# Patient Record
Sex: FEMALE | Race: WHITE | NOT HISPANIC OR LATINO | Employment: OTHER | URBAN - NONMETROPOLITAN AREA
[De-identification: names, ages, dates, MRNs, and addresses within clinical notes are randomized per-mention and may not be internally consistent; named-entity substitution may affect disease eponyms.]

---

## 2017-12-23 ENCOUNTER — HOSPITAL ENCOUNTER (EMERGENCY)
Facility: HOSPITAL | Age: 62
Discharge: HOME | End: 2017-12-23
Attending: FAMILY MEDICINE | Admitting: FAMILY MEDICINE

## 2017-12-23 ENCOUNTER — APPOINTMENT (OUTPATIENT)
Dept: GENERAL RADIOLOGY | Facility: HOSPITAL | Age: 62
End: 2017-12-23

## 2017-12-23 VITALS
BODY MASS INDEX: 34.36 KG/M2 | OXYGEN SATURATION: 99 % | SYSTOLIC BLOOD PRESSURE: 125 MMHG | HEART RATE: 72 BPM | TEMPERATURE: 98 F | WEIGHT: 175 LBS | RESPIRATION RATE: 17 BRPM | HEIGHT: 60 IN | DIASTOLIC BLOOD PRESSURE: 69 MMHG

## 2017-12-23 DIAGNOSIS — S20.211A RIB CONTUSION, RIGHT, INITIAL ENCOUNTER: Primary | ICD-10-CM

## 2017-12-23 PROCEDURE — 71101 X-RAY EXAM UNILAT RIBS/CHEST: CPT | Performed by: RADIOLOGY

## 2017-12-23 PROCEDURE — 99283 EMERGENCY DEPT VISIT LOW MDM: CPT

## 2017-12-23 PROCEDURE — 71101 X-RAY EXAM UNILAT RIBS/CHEST: CPT

## 2017-12-23 RX ORDER — ACETAMINOPHEN AND CODEINE PHOSPHATE 300; 30 MG/1; MG/1
1 TABLET ORAL EVERY 6 HOURS PRN
Qty: 12 TABLET | Refills: 0 | Status: SHIPPED | OUTPATIENT
Start: 2017-12-23

## 2017-12-23 RX ORDER — ONDANSETRON 4 MG/1
4 TABLET, ORALLY DISINTEGRATING ORAL ONCE
Status: COMPLETED | OUTPATIENT
Start: 2017-12-23 | End: 2017-12-23

## 2017-12-23 RX ORDER — HYDROCODONE BITARTRATE AND ACETAMINOPHEN 7.5; 325 MG/1; MG/1
1 TABLET ORAL ONCE
Status: COMPLETED | OUTPATIENT
Start: 2017-12-23 | End: 2017-12-23

## 2017-12-23 RX ORDER — FLUOXETINE HYDROCHLORIDE 20 MG/1
40 CAPSULE ORAL DAILY
COMMUNITY

## 2017-12-23 RX ORDER — CYCLOBENZAPRINE HCL 10 MG
10 TABLET ORAL 2 TIMES DAILY PRN
Qty: 20 TABLET | Refills: 0 | Status: SHIPPED | OUTPATIENT
Start: 2017-12-23

## 2017-12-23 RX ORDER — METHYLPREDNISOLONE 4 MG/1
TABLET ORAL
Qty: 21 TABLET | Refills: 0 | OUTPATIENT
Start: 2017-12-23 | End: 2019-05-27

## 2017-12-23 RX ORDER — ASPIRIN 81 MG/1
81 TABLET, CHEWABLE ORAL DAILY
COMMUNITY

## 2017-12-23 RX ADMIN — HYDROCODONE BITARTRATE AND ACETAMINOPHEN 1 TABLET: 7.5; 325 TABLET ORAL at 17:07

## 2017-12-23 RX ADMIN — ONDANSETRON 4 MG: 4 TABLET, ORALLY DISINTEGRATING ORAL at 17:07

## 2017-12-23 NOTE — ED PROVIDER NOTES
Subjective   Patient is a 62 y.o. female presenting with fall.   History provided by:  Patient  Fall   Mechanism of injury: fall    Injury location:  Torso  Torso injury location:  R chest  Incident location:  Home  Time since incident:  5 days  Arrived directly from scene: no    Associated symptoms: difficulty breathing (pain)    Associated symptoms: no abdominal pain and no chest pain        Review of Systems   Constitutional: Negative.  Negative for fever.   HENT: Negative.    Respiratory: Negative.    Cardiovascular: Negative.  Negative for chest pain.   Gastrointestinal: Negative.  Negative for abdominal pain.   Endocrine: Negative.    Genitourinary: Negative.  Negative for dysuria.   Skin: Negative.    Neurological: Negative.    Psychiatric/Behavioral: Negative.    All other systems reviewed and are negative.      Past Medical History:   Diagnosis Date   • Arthritis    • Diabetes mellitus    • Hyperlipidemia    • Hypertension        Allergies   Allergen Reactions   • Penicillins        History reviewed. No pertinent surgical history.    History reviewed. No pertinent family history.    Social History     Social History   • Marital status: Single     Spouse name: N/A   • Number of children: N/A   • Years of education: N/A     Social History Main Topics   • Smoking status: Never Smoker   • Smokeless tobacco: None   • Alcohol use No   • Drug use: No   • Sexual activity: Defer     Other Topics Concern   • None     Social History Narrative   • None           Objective   Physical Exam   Constitutional: She is oriented to person, place, and time. She appears well-developed and well-nourished. No distress.   HENT:   Head: Normocephalic and atraumatic.   Nose: Nose normal.   Eyes: Conjunctivae are normal.   Neck: Normal range of motion. Neck supple. No JVD present. No tracheal deviation present.   Cardiovascular: Normal rate, regular rhythm and normal heart sounds.    No murmur heard.  Pulmonary/Chest: Effort normal  and breath sounds normal. No respiratory distress. She has no wheezes.   Right sided anterior costal pain.  Pain with inspiration.   Abdominal: Bowel sounds are normal. There is no tenderness.   Musculoskeletal: Normal range of motion. She exhibits no edema or deformity.   Neurological: She is alert and oriented to person, place, and time. No cranial nerve deficit.   Skin: Skin is warm and dry. No rash noted. She is not diaphoretic. No erythema. No pallor.   Psychiatric: She has a normal mood and affect. Her behavior is normal. Thought content normal.   Nursing note and vitals reviewed.      Procedures         ED Course  ED Course   Comment By Time   XR interpreted by Dr. Sexton: No apparent acute bony abnormalities, or cardiopulmonary disease. LIBBY Pierce 12/23 1825                  MDM  Number of Diagnoses or Management Options  Rib contusion, right, initial encounter: new and requires workup     Amount and/or Complexity of Data Reviewed  Tests in the radiology section of CPT®: reviewed  Discuss the patient with other providers: yes    Risk of Complications, Morbidity, and/or Mortality  Presenting problems: low  Diagnostic procedures: low  Management options: low    Patient Progress  Patient progress: stable      Final diagnoses:   Rib contusion, right, initial encounter            LIBBY Pierce  12/23/17 0085

## 2018-10-09 ENCOUNTER — HOSPITAL ENCOUNTER (OUTPATIENT)
Dept: PHYSICAL THERAPY | Facility: HOSPITAL | Age: 63
Setting detail: THERAPIES SERIES
Discharge: HOME | End: 2018-10-09

## 2018-10-09 DIAGNOSIS — M51.36 LUMBAR DEGENERATIVE DISC DISEASE: Primary | ICD-10-CM

## 2018-10-09 PROCEDURE — G0283 ELEC STIM OTHER THAN WOUND: HCPCS | Performed by: PHYSICAL THERAPIST

## 2018-10-09 PROCEDURE — 97163 PT EVAL HIGH COMPLEX 45 MIN: CPT | Performed by: PHYSICAL THERAPIST

## 2018-10-09 PROCEDURE — 97010 HOT OR COLD PACKS THERAPY: CPT | Performed by: PHYSICAL THERAPIST

## 2018-10-09 NOTE — THERAPY EVALUATION
"    Outpatient Physical Therapy Ortho Initial Evaluation   [place]     Patient Name: Breanne Dickson  : 1955  MRN: [id number]  Today's Date: 10/9/2018      Visit Date: 10/09/2018    There is no problem list on file for this patient.       Past Medical History:   Diagnosis Date   • Arthritis    • Diabetes mellitus (CMS/HCC)    • Hyperlipidemia    • Hypertension    • Multiple rib fractures         History reviewed. No pertinent surgical history.    Visit Dx:     ICD-10-CM ICD-9-CM   1. Lumbar degenerative disc disease M51.36 722.52             Patient History     Row Name 10/09/18 1300             History    Chief Complaint Difficulty Walking;Difficulty with daily activities;Muscle tenderness;Muscle weakness;Pain  -BE      Type of Pain Back pain  -BE      Date Current Problem(s) Began --   20+ years  -BE      Brief Description of Current Complaint Patient reports that she has been suffering from chronic low back pain for approximately 20+ years; however, she notes that the pain has gotten worse over the past year.  Patient reports that she experiences numbness/tingling in the LEs, especially when she walks; she reports that it extends to the calf.  Patient reports that she had initially only noticed the numbness/tingling in the left LE, but began noticing numbness/tingling in the right LE approximately 6-8 months.  She denies any changes in bowel/bladder function.  She reports that she has had an MRI, which showed \"collapsed vertebrae, spinal stenosis, osteoarthritis.\"  Patient reports that surgery may be considered in the future.  -BE      Patient/Caregiver Goals Relieve pain;Improve mobility;Improve strength  -BE      Current Tobacco Use Electronic cigarettes  -BE      Smoking Status electronic cigarettes  -BE      Patient's Rating of General Health Good  -BE      Hand Dominance right-handed  -BE      Occupation/sports/leisure activities Retired  -BE      Patient seeing anyone else for problem(s)? " Neurosurgeon  -BE      How has patient tried to help current problem? Medication  -BE      What clinical tests have you had for this problem? CT scan;MRI  -BE      Are you or can you be pregnant No  -BE         Pain     Pain Location Back  -BE      Pain at Present 4  -BE      Pain at Best 4  -BE      Pain at Worst 8  -BE      Pain Frequency Constant/continuous  -BE      Pain Description Aching;Dull;Sharp;Stabbing;Tender  -BE      What Performance Factors Make the Current Problem(s) WORSE? Sitting, walking, standing, bending  -BE      What Performance Factors Make the Current Problem(s) BETTER? Rest, medicaiton  -BE      Tolerance Time- Standing 1.5 hour  -BE      Tolerance Time- Sitting 10 min  -BE      Tolerance Time- Walking 5-10 min  -BE      Is your sleep disturbed? Yes  -BE      Total hours of sleep per night 3-5 hours disturbedd  -BE      Difficulties with ADL's? Independent with ADLs, but has increased pain.  -BE         Fall Risk Assessment    Any falls in the past year: Yes  -BE      Number of falls reported in the last 12 months 1/month  -BE      Factors that contributed to the fall: Tripped;Lost balance  -BE      Does patient have a fear of falling Yes (comment)  -BE         Services    Are you currently receiving Home Health services No  -BE         Daily Activities    Primary Language English  -BE      Are you able to read Yes  -BE      Are you able to write Yes  -BE      How does patient learn best? Demonstration  -BE      Teaching needs identified Home Exercise Program  -BE      Does patient have problems with the following? Depression   MD aware, per patient  -BE      Pt Participated in POC and Goals Yes  -BE         Safety    Are you being hurt, hit, or frightened by anyone at home or in your life? No  -BE      Are you being neglected by a caregiver No  -BE        User Key  (r) = Recorded By, (t) = Taken By, (c) = Cosigned By    Initials Name Provider Type    BE Rosa Mosquera, PT Physical  Therapist                PT Ortho     Row Name 10/09/18 1300       Quarter Clearing    Quarter Clearing Lower Quarter Clearing  -BE       DTR- Lower Quarter Clearing    Patellar tendon (L2-4) Bilateral:;2- Normal response  -BE    Achilles tendon (S1-2) Bilateral:;2- Normal response  -BE       Neural Tension Signs- Lower Quarter Clearing    Slump Bilateral:;Negative  -BE       Sensory Screen for Light Touch- Lower Quarter Clearing    L1 (inguinal area) Bilateral:;Intact  -BE    L2 (anterior mid thigh) Bilateral:;Intact  -BE    L3 (distal anterior thigh) Bilateral:;Intact  -BE    L4 (medial lower leg/foot) Bilateral:;Intact  -BE    L5 (lateral lower leg/great toe) Bilateral:;Intact  -BE    S1 (bottom of foot) Bilateral:;Intact  -BE       Myotomal Screen- Lower Quarter Clearing    Hip flexion (L2) Bilateral:;4- (Good -)  -BE    Knee extension (L3) Bilateral:;4 (Good)  -BE    Knee flexion (S2) Bilateral:;4 (Good)  -BE       Lumbar ROM Screen- Lower Quarter Clearing    Lumbar Flexion Impaired   50%  -BE    Lumbar Extension Impaired   25%  -BE    Lumbar Lateral Flexion Impaired   Left-50%, Right-50%  -BE    Lumbar Rotation Impaired   Left-50%, Right-50%  -BE       Special Tests/Palpation    Special Tests/Palpation Lumbar/SI  -BE       Lumbosacral Palpation    SI Tender  -BE    Lumbosacral Segment Tender  -BE    Spinous Process Tender  -BE    Quadratus Lumborum Bilateral:;Guarded/taut  -BE    Erector Spinae (Paraspinals) Bilateral:;Guarded/taut  -BE      User Key  (r) = Recorded By, (t) = Taken By, (c) = Cosigned By    Initials Name Provider Type    BE Rosa Mosquera PT Physical Therapist                                  PT OP Goals     Row Name 10/09/18 1400          PT Short Term Goals    STG Date to Achieve 10/23/18  -BE     STG 1 Patient will be independent/compliant with HEP.  -BE     STG 2 Patient will report pain no greater than 6/10 when performing self-care activities.  -BE     STG 3 Patient will report pain  no greater than 6/10 when sitting to travel community distances.  -BE        Long Term Goals    LTG Date to Achieve 11/08/18  -BE     LTG 1 Lumbar ROM will improve by at least 25% to allow for greater ease with ADLs.  -BE     LTG 2 LE strength will improve to at least 4+/5 to prevent injury.  -BE     LTG 3 Modified Oswestry will improve by at least 10% to show improved functional mobility.  -BE     LTG 4 Patient will report pain no greater than 4/10 when performing light household chores.  -BE        Time Calculation    PT Goal Re-Cert Due Date 11/08/18  -BE       User Key  (r) = Recorded By, (t) = Taken By, (c) = Cosigned By    Initials Name Provider Type    BE Rosa Mosquera PT Physical Therapist                PT Assessment/Plan     Row Name 10/09/18 1500          PT Assessment    Functional Limitations Impaired gait;Impaired locomotion;Limitation in home management;Limitations in community activities;Performance in leisure activities;Performance in self-care ADL  -BE     Impairments Balance;Gait;Joint mobility;Muscle strength;Pain;Poor body mechanics;Posture;Range of motion  -BE     Assessment Comments Patient is a 63 year old female referred to therapy with lumbar degenerative disc disease.  Patient displays decreased lumbar ROM, decreased LE strength, and increased pain.  Patient reports 4/10 pain at best and 8/10 pain at worst.  She notes that it is difficult to sit or walk for extended lengths of time, due to pain.  Patient reports a 40% functional mobility impairment, based on her response to the Modified Oswestry.  Patient will benefit from skilled PT so that she can achieve her maximum level of function.  -BE     Please refer to paper survey for additional self-reported information Yes  -BE     Rehab Potential Good  -BE     Patient/caregiver participated in establishment of treatment plan and goals Yes  -BE     Patient would benefit from skilled therapy intervention Yes  -BE        PT Plan    PT  Frequency 2x/week;3x/week  -BE     Predicted Duration of Therapy Intervention (Therapy Eval) 4 weeks  -BE     Planned CPT's? PT EVAL HIGH COMPLEXITY: 94198;PT RE-EVAL: 46340;PT THER PROC EA 15 MIN: 56615;PT THER ACT EA 15 MIN: 88421;PT MANUAL THERAPY EA 15 MIN: 80959;PT NEUROMUSC RE-EDUCATION EA 15 MIN: 86318;PT GAIT TRAINING EA 15 MIN: 66796;PT ELECTRICAL STIM UNATTEND: ;PT ELECTRICAL STIM ATTD EA 15 MIN: 49211;PT ULTRASOUND EA 15 MIN: 87878;PT HOT/COLD PACK WC NONMCARE: 97843;PT THER SUPP EA 15 MIN  -BE     PT Plan Comments Above interventions to be used to promote improved functional mobility.  -BE       User Key  (r) = Recorded By, (t) = Taken By, (c) = Cosigned By    Initials Name Provider Type    BE Rosa Mosquera, PT Physical Therapist                Modalities     Row Name 10/09/18 1300             Moist Heat    MH Applied Yes   no redness following Mh  -BE      Location Lumbar  -BE      Rx Minutes 15 mins  -BE      MH S/P Rx Yes   with ESTIM in seated position  -BE         ELECTRICAL STIMULATION    Attended/Unattended Unattended   no skin irritation following ESTIM  -BE      Stimulation Type IFC  -BE      Max mAmp --   per patient's tolerance  -BE      Location/Electrode Placement/Other Lumbar   15 min  -BE        User Key  (r) = Recorded By, (t) = Taken By, (c) = Cosigned By    Initials Name Provider Type    BE Rosa Mosquera, PT Physical Therapist              Exercises     Row Name 10/09/18 1400             Exercise 1    Exercise Name 1 HEP: LTR, supine march, supine clams  -BE      Cueing 1 Verbal;Tactile;Demo  -BE        User Key  (r) = Recorded By, (t) = Taken By, (c) = Cosigned By    Initials Name Provider Type    BE Rosa Mosquera, PT Physical Therapist                        Outcome Measure Options: Modifed Owestry  Modified Oswestry  Modified Oswestry Score/Comments: 20/50=40%      Time Calculation:     Therapy Suggested Charges     Code   Minutes Charges    None              Start Time: 1345  Stop Time: 1500  Time Calculation (min): 75 min     Therapy Charges for Today     Code Description Service Date Service Provider Modifiers Qty    32970505864 HC PT ELECTRICAL STIM UNATTENDED 10/9/2018 Rosa Mosquera, PT  1    77500122639 HC PT HOT/COLD PACK WC NONMCARE 10/9/2018 Rosa Mosquera, PT GP 1    37481130113 HC PT EVAL HIGH COMPLEXITY 4 10/9/2018 Rosa Mosquera, PT GP 1          PT G-Codes  Outcome Measure Options: Elijah Blanco  Modified Oswestry Score/Comments: 20/50=40%         Rosa Mosquera, PT  10/9/2018

## 2018-10-10 ENCOUNTER — TRANSCRIBE ORDERS (OUTPATIENT)
Dept: PHYSICAL THERAPY | Facility: HOSPITAL | Age: 63
End: 2018-10-10

## 2018-10-10 DIAGNOSIS — M51.36 DEGENERATION OF LUMBAR INTERVERTEBRAL DISC: Primary | ICD-10-CM

## 2018-10-12 ENCOUNTER — APPOINTMENT (OUTPATIENT)
Dept: PHYSICAL THERAPY | Facility: HOSPITAL | Age: 63
End: 2018-10-12

## 2018-10-18 ENCOUNTER — HOSPITAL ENCOUNTER (OUTPATIENT)
Dept: PHYSICAL THERAPY | Facility: HOSPITAL | Age: 63
Setting detail: THERAPIES SERIES
Discharge: HOME | End: 2018-10-18

## 2018-10-18 DIAGNOSIS — M51.36 LUMBAR DEGENERATIVE DISC DISEASE: Primary | ICD-10-CM

## 2018-10-18 PROCEDURE — 97140 MANUAL THERAPY 1/> REGIONS: CPT | Performed by: PHYSICAL THERAPIST

## 2018-10-18 PROCEDURE — 97110 THERAPEUTIC EXERCISES: CPT | Performed by: PHYSICAL THERAPIST

## 2018-10-18 PROCEDURE — 97010 HOT OR COLD PACKS THERAPY: CPT | Performed by: PHYSICAL THERAPIST

## 2018-10-18 PROCEDURE — G0283 ELEC STIM OTHER THAN WOUND: HCPCS | Performed by: PHYSICAL THERAPIST

## 2018-10-18 NOTE — THERAPY TREATMENT NOTE
Outpatient Physical Therapy Ortho Treatment Note   [place]     Patient Name: Breanne Dickson  : 1955  MRN: [id number]  Today's Date: 10/18/2018      Visit Date: 10/18/2018    Visit Dx:    ICD-10-CM ICD-9-CM   1. Lumbar degenerative disc disease M51.36 722.52       There is no problem list on file for this patient.       Past Medical History:   Diagnosis Date   • Arthritis    • Diabetes mellitus (CMS/HCC)    • Hyperlipidemia    • Hypertension    • Multiple rib fractures         No past surgical history on file.          PT Ortho     Row Name 10/18/18 1500       Subjective Comments    Subjective Comments Patient reports that she had an epidural and that her back pain has decreased.  She notes 4/10 pain today.  -BE       Subjective Pain    Able to rate subjective pain? yes  -BE    Pre-Treatment Pain Level 4  -BE    Post-Treatment Pain Level 0  -BE      User Key  (r) = Recorded By, (t) = Taken By, (c) = Cosigned By    Initials Name Provider Type    BE Rosa Mosquera PT Physical Therapist                            PT Assessment/Plan     Row Name 10/18/18 4842          PT Assessment    Assessment Comments Patient session initiated with MH/ESTIM to the lumbar region to assist with pain control; no adverse reactions were noted.  Ther ex performed in supine position, with exercises focusing on lumbar ROM, core strengthening, and LE strengthening.  Soft tissue mobilization performed to the lumbar musculature, with increased muscle guarding noted.  Patient reported no pain following today's session.  She will continue to be progressed per her tolerance and POC.  -BE        PT Plan    PT Plan Comments Progress per patient's tolerance and POC.  -BE       User Key  (r) = Recorded By, (t) = Taken By, (c) = Cosigned By    Initials Name Provider Type    BE Rosa Mosquera PT Physical Therapist                Modalities     Row Name 10/18/18 8212             Moist Heat    MH Applied Yes   no redness  following MH  -BE      Location Lumbar  -BE      Rx Minutes 15 mins  -BE      MH S/P Rx Yes   with ESTIM in seated position  -BE         ELECTRICAL STIMULATION    Attended/Unattended Unattended   no skin irritation following ESTIM  -BE      Stimulation Type IFC  -BE      Max mAmp --   per patient's tolerance  -BE      Location/Electrode Placement/Other Lumbar   15 min  -BE        User Key  (r) = Recorded By, (t) = Taken By, (c) = Cosigned By    Initials Name Provider Type    BE Rosa Mosquera, PT Physical Therapist                Exercises     Row Name 10/18/18 1500             Subjective Comments    Subjective Comments Patient reports that she had an epidural and that her back pain has decreased.  She notes 4/10 pain today.  -BE         Subjective Pain    Able to rate subjective pain? yes  -BE      Pre-Treatment Pain Level 4  -BE      Post-Treatment Pain Level 0  -BE         Total Minutes    42411 - PT Therapeutic Exercise Minutes 30  -BE      65664 - PT Manual Therapy Minutes 15  -BE         Exercise 1    Exercise Name 1 LTR, supine clams, supine march, SKTC, PPT, Piriformis stretch, SAQ  -BE      Cueing 1 Verbal;Tactile;Demo  -BE      Time 1 30 min  -BE        User Key  (r) = Recorded By, (t) = Taken By, (c) = Cosigned By    Initials Name Provider Type    BE Rosa Mosquera, PT Physical Therapist                        Manual Rx (last 36 hours)      Manual Treatments     Row Name 10/18/18 1500             Total Minutes    57787 - PT Manual Therapy Minutes 15  -BE         Manual Rx 1    Manual Rx 1 Location Lumbar  -BE      Manual Rx 1 Type Soft tissue mobilization  -BE      Manual Rx 1 Grade Per patient's tolerance  -BE      Manual Rx 1 Duration 15 min  -BE        User Key  (r) = Recorded By, (t) = Taken By, (c) = Cosigned By    Initials Name Provider Type    BE Rosa Mosquera, PT Physical Therapist                             Time Calculation:   Start Time: 1406  Stop Time: 1513  Time Calculation  (min): 67 min  Therapy Suggested Charges     Code   Minutes Charges    53091 (CPT®) Hc Pt Neuromusc Re Education Ea 15 Min      50359 (CPT®) Hc Pt Ther Proc Ea 15 Min 30 2    18999 (CPT®) Hc Gait Training Ea 15 Min      21219 (CPT®) Hc Pt Therapeutic Act Ea 15 Min      32217 (CPT®) Hc Pt Manual Therapy Ea 15 Min 15 1    20724 (CPT®) Hc Pt Ther Massage- Per 15 Min      50177 (CPT®) Hc Pt Iontophoresis Ea 15 Min      06390 (CPT®) Hc Pt Elec Stim Ea-Per 15 Min      90117 (CPT®) Hc Pt Ultrasound Ea 15 Min      56481 (CPT®) Hc Pt Self Care/Mgmt/Train Ea 15 Min      98501 (CPT®) Hc Pt Prosthetic (S) Train Initial Encounter, Each 15 Min      96652 (CPT®) Hc Orthotic(S) Mgmt/Train Initial Encounter, Each 15min      29458 (CPT®) Hc Pt Aquatic Therapy Ea 15 Min      92913 (CPT®) Hc Pt Orthotic(S)/Prosthetic(S) Encounter, Each 15 Min       (CPT®) Hc Pt Electrical Stim Unattended      Total  45 3        Therapy Charges for Today     Code Description Service Date Service Provider Modifiers Qty    50531160486 HC PT THER PROC EA 15 MIN 10/18/2018 Rosa Mosquera, PT GP 2    94737912851 HC PT MANUAL THERAPY EA 15 MIN 10/18/2018 Rosa Mosquera, PT GP 1    58136702659 HC PT ELECTRICAL STIM UNATTENDED 10/18/2018 Rosa Mosquera, PT  1    20970853777 HC PT HOT/COLD PACK WC NONMCARE 10/18/2018 Rosa Mosquera, PT GP 1                    Rosa Mosquera, PT  10/18/2018

## 2018-11-29 ENCOUNTER — DOCUMENTATION (OUTPATIENT)
Dept: PHYSICAL THERAPY | Facility: HOSPITAL | Age: 63
End: 2018-11-29

## 2018-11-29 DIAGNOSIS — M51.36 LUMBAR DEGENERATIVE DISC DISEASE: Primary | ICD-10-CM

## 2018-11-29 NOTE — THERAPY DISCHARGE NOTE
Outpatient Physical Therapy Discharge Summary         Patient Name: Breanne Dickson  : 1955  MRN: [id number]    Today's Date: 2018    Visit Dx:    ICD-10-CM ICD-9-CM   1. Lumbar degenerative disc disease M51.36 722.52       PT OP Goals     Row Name 18 1100          PT Short Term Goals    STG Date to Achieve  10/23/18  -BE     STG 1  Patient will be independent/compliant with HEP.  -BE     STG 1 Progress Comments  unable to assess  -BE     STG 2  Patient will report pain no greater than 6/10 when performing self-care activities.  -BE     STG 2 Progress Comments  unable to assess  -BE     STG 3  Patient will report pain no greater than 6/10 when sitting to travel community distances.  -BE     STG 3 Progress Comments  unable to assess  -BE        Long Term Goals    LTG Date to Achieve  18  -BE     LTG 1  Lumbar ROM will improve by at least 25% to allow for greater ease with ADLs.  -BE     LTG 1 Progress Comments  unable to assess  -BE     LTG 2  LE strength will improve to at least 4+/5 to prevent injury.  -BE     LTG 2 Progress Comments  unable to assess  -BE     LTG 3  Modified Oswestry will improve by at least 10% to show improved functional mobility.  -BE     LTG 3 Progress Comments  unable to assess  -BE     LTG 4  Patient will report pain no greater than 4/10 when performing light household chores.  -BE     LTG 4 Progress Comments  unable to assess  -BE       User Key  (r) = Recorded By, (t) = Taken By, (c) = Cosigned By    Initials Name Provider Type    BE Rosa Mosquera PT Physical Therapist          OP PT Discharge Summary  Date of Discharge: 18  Reason for Discharge: other (comment)(failure to return to PT)  Outcomes Achieved: Refer to plan of care for updates on goals achieved  Discharge Destination: Unknown  Discharge Instructions/Additional Comments: Patient to be discharged at this time, due to failure to return to PT.  Patient attended therapy for a total of 2  sessions, dating from 10/9/2018 to 10/18/2018.  Attempts were made to contact patient, but she could not be reached.  Current status is unknown since goals/outcome measures could not be assessed.      Time Calculation:        Therapy Suggested Charges     Code   Minutes Charges    None                       Rosa Mosquera, PT  11/29/2018

## 2019-05-27 ENCOUNTER — HOSPITAL ENCOUNTER (EMERGENCY)
Facility: HOSPITAL | Age: 64
Discharge: HOME | End: 2019-05-27
Attending: EMERGENCY MEDICINE | Admitting: EMERGENCY MEDICINE

## 2019-05-27 VITALS
TEMPERATURE: 98.7 F | SYSTOLIC BLOOD PRESSURE: 145 MMHG | RESPIRATION RATE: 16 BRPM | HEART RATE: 87 BPM | DIASTOLIC BLOOD PRESSURE: 84 MMHG | BODY MASS INDEX: 32.79 KG/M2 | OXYGEN SATURATION: 98 % | WEIGHT: 167 LBS | HEIGHT: 60 IN

## 2019-05-27 DIAGNOSIS — M10.9 ACUTE GOUTY ARTHRITIS: Primary | ICD-10-CM

## 2019-05-27 LAB — URATE SERPL-MCNC: 8.6 MG/DL (ref 2.4–5.7)

## 2019-05-27 PROCEDURE — 25010000002 DEXAMETHASONE SODIUM PHOSPHATE 20 MG/5ML SOLUTION: Performed by: EMERGENCY MEDICINE

## 2019-05-27 PROCEDURE — 84550 ASSAY OF BLOOD/URIC ACID: CPT | Performed by: EMERGENCY MEDICINE

## 2019-05-27 PROCEDURE — 25010000002 KETOROLAC TROMETHAMINE PER 15 MG: Performed by: EMERGENCY MEDICINE

## 2019-05-27 PROCEDURE — 99283 EMERGENCY DEPT VISIT LOW MDM: CPT

## 2019-05-27 PROCEDURE — 96372 THER/PROPH/DIAG INJ SC/IM: CPT

## 2019-05-27 RX ORDER — HYDROCODONE BITARTRATE AND ACETAMINOPHEN 7.5; 325 MG/1; MG/1
1 TABLET ORAL ONCE
Status: COMPLETED | OUTPATIENT
Start: 2019-05-27 | End: 2019-05-27

## 2019-05-27 RX ORDER — INDOMETHACIN 50 MG/1
50 CAPSULE ORAL
Qty: 15 CAPSULE | Refills: 0 | Status: SHIPPED | OUTPATIENT
Start: 2019-05-27

## 2019-05-27 RX ORDER — KETOROLAC TROMETHAMINE 30 MG/ML
30 INJECTION, SOLUTION INTRAMUSCULAR; INTRAVENOUS ONCE
Status: COMPLETED | OUTPATIENT
Start: 2019-05-27 | End: 2019-05-27

## 2019-05-27 RX ORDER — METHYLPREDNISOLONE 4 MG/1
TABLET ORAL
Qty: 21 TABLET | Refills: 0 | Status: SHIPPED | OUTPATIENT
Start: 2019-05-27

## 2019-05-27 RX ORDER — DEXAMETHASONE SODIUM PHOSPHATE 4 MG/ML
10 INJECTION, SOLUTION INTRA-ARTICULAR; INTRALESIONAL; INTRAMUSCULAR; INTRAVENOUS; SOFT TISSUE ONCE
Status: COMPLETED | OUTPATIENT
Start: 2019-05-27 | End: 2019-05-27

## 2019-05-27 RX ADMIN — DEXAMETHASONE SODIUM PHOSPHATE 10 MG: 4 INJECTION, SOLUTION INTRAMUSCULAR; INTRAVENOUS at 19:21

## 2019-05-27 RX ADMIN — KETOROLAC TROMETHAMINE 30 MG: 30 INJECTION, SOLUTION INTRAMUSCULAR at 19:20

## 2019-05-27 RX ADMIN — HYDROCODONE BITARTRATE AND ACETAMINOPHEN 1 TABLET: 7.5; 325 TABLET ORAL at 19:20

## 2021-03-19 ENCOUNTER — APPOINTMENT (OUTPATIENT)
Dept: MAMMOGRAPHY | Facility: HOSPITAL | Age: 66
End: 2021-03-19

## 2021-03-30 ENCOUNTER — TRANSCRIBE ORDERS (OUTPATIENT)
Dept: ADMINISTRATIVE | Facility: HOSPITAL | Age: 66
End: 2021-03-30

## 2021-03-30 DIAGNOSIS — F17.210 CIGARETTE SMOKER: Primary | ICD-10-CM

## 2021-05-28 ENCOUNTER — HOSPITAL ENCOUNTER (OUTPATIENT)
Dept: CT IMAGING | Facility: HOSPITAL | Age: 66
Discharge: HOME | End: 2021-05-28

## 2021-05-28 ENCOUNTER — HOSPITAL ENCOUNTER (OUTPATIENT)
Dept: BONE DENSITY | Facility: HOSPITAL | Age: 66
Discharge: HOME | End: 2021-05-28

## 2021-05-28 ENCOUNTER — HOSPITAL ENCOUNTER (OUTPATIENT)
Dept: MAMMOGRAPHY | Facility: HOSPITAL | Age: 66
Discharge: HOME | End: 2021-05-28

## 2021-05-28 DIAGNOSIS — Z12.31 VISIT FOR SCREENING MAMMOGRAM: ICD-10-CM

## 2021-05-28 DIAGNOSIS — M81.0 AGE RELATED OSTEOPOROSIS, UNSPECIFIED PATHOLOGICAL FRACTURE PRESENCE: ICD-10-CM

## 2021-05-28 DIAGNOSIS — F17.210 CIGARETTE SMOKER: ICD-10-CM

## 2021-05-28 PROCEDURE — 71271 CT THORAX LUNG CANCER SCR C-: CPT | Performed by: RADIOLOGY

## 2021-05-28 PROCEDURE — 77063 BREAST TOMOSYNTHESIS BI: CPT

## 2021-05-28 PROCEDURE — 71271 CT THORAX LUNG CANCER SCR C-: CPT

## 2021-05-28 PROCEDURE — 77080 DXA BONE DENSITY AXIAL: CPT | Performed by: RADIOLOGY

## 2021-05-28 PROCEDURE — 77080 DXA BONE DENSITY AXIAL: CPT

## 2021-05-28 PROCEDURE — 77067 SCR MAMMO BI INCL CAD: CPT

## 2021-05-31 PROCEDURE — 77067 SCR MAMMO BI INCL CAD: CPT | Performed by: RADIOLOGY

## 2021-05-31 PROCEDURE — 77063 BREAST TOMOSYNTHESIS BI: CPT | Performed by: RADIOLOGY

## 2023-01-06 ENCOUNTER — TRANSCRIBE ORDERS (OUTPATIENT)
Dept: ADMINISTRATIVE | Facility: HOSPITAL | Age: 68
End: 2023-01-06
Payer: MEDICARE

## 2023-01-06 DIAGNOSIS — Z87.891 PERSONAL HISTORY OF TOBACCO USE, PRESENTING HAZARDS TO HEALTH: Primary | ICD-10-CM

## 2023-02-07 ENCOUNTER — HOSPITAL ENCOUNTER (OUTPATIENT)
Dept: CT IMAGING | Facility: HOSPITAL | Age: 68
Discharge: HOME | End: 2023-02-07
Admitting: FAMILY MEDICINE
Payer: MEDICARE

## 2023-02-07 DIAGNOSIS — Z87.891 PERSONAL HISTORY OF TOBACCO USE, PRESENTING HAZARDS TO HEALTH: ICD-10-CM

## 2023-02-07 PROCEDURE — 71271 CT THORAX LUNG CANCER SCR C-: CPT

## 2023-02-07 PROCEDURE — 71271 CT THORAX LUNG CANCER SCR C-: CPT | Performed by: RADIOLOGY

## 2024-02-22 ENCOUNTER — APPOINTMENT (OUTPATIENT)
Dept: MRI IMAGING | Facility: HOSPITAL | Age: 69
End: 2024-02-22
Payer: MEDICARE

## 2024-02-22 ENCOUNTER — APPOINTMENT (OUTPATIENT)
Dept: GENERAL RADIOLOGY | Facility: HOSPITAL | Age: 69
End: 2024-02-22
Payer: MEDICARE

## 2024-02-22 ENCOUNTER — HOSPITAL ENCOUNTER (INPATIENT)
Facility: HOSPITAL | Age: 69
LOS: 6 days | Discharge: HOME | End: 2024-02-28
Attending: STUDENT IN AN ORGANIZED HEALTH CARE EDUCATION/TRAINING PROGRAM | Admitting: INTERNAL MEDICINE
Payer: MEDICARE

## 2024-02-22 ENCOUNTER — APPOINTMENT (OUTPATIENT)
Dept: CT IMAGING | Facility: HOSPITAL | Age: 69
End: 2024-02-22
Payer: MEDICARE

## 2024-02-22 DIAGNOSIS — J44.1 COPD WITH ACUTE EXACERBATION: ICD-10-CM

## 2024-02-22 DIAGNOSIS — S72.114A CLOSED NONDISPLACED FRACTURE OF GREATER TROCHANTER OF RIGHT FEMUR, INITIAL ENCOUNTER: ICD-10-CM

## 2024-02-22 DIAGNOSIS — A41.9 SEPSIS, DUE TO UNSPECIFIED ORGANISM, UNSPECIFIED WHETHER ACUTE ORGAN DYSFUNCTION PRESENT: ICD-10-CM

## 2024-02-22 DIAGNOSIS — S72.144A CLOSED NONDISPLACED INTERTROCHANTERIC FRACTURE OF RIGHT FEMUR, INITIAL ENCOUNTER: Primary | ICD-10-CM

## 2024-02-22 DIAGNOSIS — J96.21 ACUTE ON CHRONIC RESPIRATORY FAILURE WITH HYPOXIA: ICD-10-CM

## 2024-02-22 DIAGNOSIS — E87.70 HYPERVOLEMIA, UNSPECIFIED HYPERVOLEMIA TYPE: ICD-10-CM

## 2024-02-22 DIAGNOSIS — J44.9 CHRONIC OBSTRUCTIVE PULMONARY DISEASE, UNSPECIFIED COPD TYPE: ICD-10-CM

## 2024-02-22 PROBLEM — S72.141A INTERTROCHANTERIC FRACTURE OF RIGHT FEMUR: Status: ACTIVE | Noted: 2024-02-22

## 2024-02-22 LAB
A-A DO2: 52 MMHG (ref 0–300)
ALBUMIN SERPL-MCNC: 3.8 G/DL (ref 3.5–5.2)
ALBUMIN/GLOB SERPL: 1.2 G/DL
ALP SERPL-CCNC: 88 U/L (ref 39–117)
ALT SERPL W P-5'-P-CCNC: 18 U/L (ref 1–33)
ANION GAP SERPL CALCULATED.3IONS-SCNC: 12.4 MMOL/L (ref 5–15)
ARTERIAL PATENCY WRIST A: ABNORMAL
AST SERPL-CCNC: 21 U/L (ref 1–32)
ATMOSPHERIC PRESS: 719 MMHG
BASE EXCESS BLDA CALC-SCNC: -2.2 MMOL/L (ref 0–2)
BASOPHILS # BLD AUTO: 0.04 10*3/MM3 (ref 0–0.2)
BASOPHILS NFR BLD AUTO: 0.3 % (ref 0–1.5)
BDY SITE: ABNORMAL
BILIRUB SERPL-MCNC: 0.3 MG/DL (ref 0–1.2)
BILIRUB UR QL STRIP: NEGATIVE
BUN SERPL-MCNC: 27 MG/DL (ref 8–23)
BUN/CREAT SERPL: 28.7 (ref 7–25)
CALCIUM SPEC-SCNC: 9.6 MG/DL (ref 8.6–10.5)
CHLORIDE SERPL-SCNC: 103 MMOL/L (ref 98–107)
CLARITY UR: CLEAR
CO2 BLDA-SCNC: 22.1 MMOL/L (ref 22–33)
CO2 SERPL-SCNC: 22.6 MMOL/L (ref 22–29)
COHGB MFR BLD: 3.2 % (ref 0–5)
COLOR UR: YELLOW
CREAT SERPL-MCNC: 0.94 MG/DL (ref 0.57–1)
CRP SERPL-MCNC: 8.72 MG/DL (ref 0–0.5)
D DIMER PPP FEU-MCNC: 1.28 MCGFEU/ML (ref 0–0.67)
D-LACTATE SERPL-SCNC: 1.2 MMOL/L (ref 0.5–2)
DEPRECATED RDW RBC AUTO: 58.3 FL (ref 37–54)
EGFRCR SERPLBLD CKD-EPI 2021: 66.6 ML/MIN/1.73
EOSINOPHIL # BLD AUTO: 0.34 10*3/MM3 (ref 0–0.4)
EOSINOPHIL NFR BLD AUTO: 2.6 % (ref 0.3–6.2)
ERYTHROCYTE [DISTWIDTH] IN BLOOD BY AUTOMATED COUNT: 17.9 % (ref 12.3–15.4)
FLUAV RNA RESP QL NAA+PROBE: NOT DETECTED
FLUBV RNA ISLT QL NAA+PROBE: NOT DETECTED
GEN 5 2HR TROPONIN T REFLEX: 50 NG/L
GLOBULIN UR ELPH-MCNC: 3.1 GM/DL
GLUCOSE BLDC GLUCOMTR-MCNC: 134 MG/DL (ref 70–130)
GLUCOSE SERPL-MCNC: 91 MG/DL (ref 65–99)
GLUCOSE UR STRIP-MCNC: ABNORMAL MG/DL
HCO3 BLDA-SCNC: 21.1 MMOL/L (ref 20–26)
HCT VFR BLD AUTO: 42.1 % (ref 34–46.6)
HCT VFR BLD CALC: 42.8 % (ref 38–51)
HGB BLD-MCNC: 13.6 G/DL (ref 12–15.9)
HGB BLDA-MCNC: 13.9 G/DL (ref 13.5–17.5)
HGB UR QL STRIP.AUTO: NEGATIVE
HOLD SPECIMEN: NORMAL
HOLD SPECIMEN: NORMAL
IMM GRANULOCYTES # BLD AUTO: 0.05 10*3/MM3 (ref 0–0.05)
IMM GRANULOCYTES NFR BLD AUTO: 0.4 % (ref 0–0.5)
INHALED O2 CONCENTRATION: 21 %
KETONES UR QL STRIP: NEGATIVE
LEUKOCYTE ESTERASE UR QL STRIP.AUTO: NEGATIVE
LYMPHOCYTES # BLD AUTO: 3.3 10*3/MM3 (ref 0.7–3.1)
LYMPHOCYTES NFR BLD AUTO: 25.2 % (ref 19.6–45.3)
Lab: ABNORMAL
Lab: ABNORMAL
MCH RBC QN AUTO: 28.8 PG (ref 26.6–33)
MCHC RBC AUTO-ENTMCNC: 32.3 G/DL (ref 31.5–35.7)
MCV RBC AUTO: 89.2 FL (ref 79–97)
METHGB BLD QL: 0 % (ref 0–3)
MODALITY: ABNORMAL
MONOCYTES # BLD AUTO: 0.84 10*3/MM3 (ref 0.1–0.9)
MONOCYTES NFR BLD AUTO: 6.4 % (ref 5–12)
NEUTROPHILS NFR BLD AUTO: 65.1 % (ref 42.7–76)
NEUTROPHILS NFR BLD AUTO: 8.51 10*3/MM3 (ref 1.7–7)
NITRITE UR QL STRIP: NEGATIVE
NOTIFIED BY: ABNORMAL
NOTIFIED WHO: ABNORMAL
NRBC BLD AUTO-RTO: 0 /100 WBC (ref 0–0.2)
NT-PROBNP SERPL-MCNC: ABNORMAL PG/ML (ref 0–900)
OXYHGB MFR BLDV: 86 % (ref 94–99)
PCO2 BLDA: 31.5 MM HG (ref 35–45)
PCO2 TEMP ADJ BLD: ABNORMAL MM[HG]
PH BLDA: 7.43 PH UNITS (ref 7.35–7.45)
PH UR STRIP.AUTO: 5.5 [PH] (ref 5–8)
PH, TEMP CORRECTED: ABNORMAL
PLATELET # BLD AUTO: 336 10*3/MM3 (ref 140–450)
PMV BLD AUTO: 9.7 FL (ref 6–12)
PO2 BLDA: 53.6 MM HG (ref 83–108)
PO2 TEMP ADJ BLD: ABNORMAL MM[HG]
POTASSIUM SERPL-SCNC: 4 MMOL/L (ref 3.5–5.2)
PROCALCITONIN SERPL-MCNC: 0.14 NG/ML (ref 0–0.25)
PROT SERPL-MCNC: 6.9 G/DL (ref 6–8.5)
PROT UR QL STRIP: NEGATIVE
RBC # BLD AUTO: 4.72 10*6/MM3 (ref 3.77–5.28)
SAO2 % BLDCOA: 88.8 % (ref 94–99)
SARS-COV-2 RNA RESP QL NAA+PROBE: NOT DETECTED
SODIUM SERPL-SCNC: 138 MMOL/L (ref 136–145)
SP GR UR STRIP: 1.02 (ref 1–1.03)
TROPONIN T DELTA: -3 NG/L
TROPONIN T SERPL HS-MCNC: 53 NG/L
TSH SERPL DL<=0.05 MIU/L-ACNC: 0.92 UIU/ML (ref 0.27–4.2)
URATE SERPL-MCNC: 6.6 MG/DL (ref 2.4–5.7)
UROBILINOGEN UR QL STRIP: ABNORMAL
VENTILATOR MODE: ABNORMAL
WBC NRBC COR # BLD AUTO: 13.08 10*3/MM3 (ref 3.4–10.8)
WHOLE BLOOD HOLD COAG: NORMAL
WHOLE BLOOD HOLD SPECIMEN: NORMAL

## 2024-02-22 PROCEDURE — 82805 BLOOD GASES W/O2 SATURATION: CPT

## 2024-02-22 PROCEDURE — 25010000002 FUROSEMIDE PER 20 MG: Performed by: PHYSICIAN ASSISTANT

## 2024-02-22 PROCEDURE — 73502 X-RAY EXAM HIP UNI 2-3 VIEWS: CPT | Performed by: RADIOLOGY

## 2024-02-22 PROCEDURE — 93005 ELECTROCARDIOGRAM TRACING: CPT | Performed by: PHYSICIAN ASSISTANT

## 2024-02-22 PROCEDURE — 70450 CT HEAD/BRAIN W/O DYE: CPT | Performed by: RADIOLOGY

## 2024-02-22 PROCEDURE — 73721 MRI JNT OF LWR EXTRE W/O DYE: CPT

## 2024-02-22 PROCEDURE — 71275 CT ANGIOGRAPHY CHEST: CPT

## 2024-02-22 PROCEDURE — 83880 ASSAY OF NATRIURETIC PEPTIDE: CPT | Performed by: PHYSICIAN ASSISTANT

## 2024-02-22 PROCEDURE — 85379 FIBRIN DEGRADATION QUANT: CPT | Performed by: PHYSICIAN ASSISTANT

## 2024-02-22 PROCEDURE — 85025 COMPLETE CBC W/AUTO DIFF WBC: CPT | Performed by: PHYSICIAN ASSISTANT

## 2024-02-22 PROCEDURE — 82948 REAGENT STRIP/BLOOD GLUCOSE: CPT

## 2024-02-22 PROCEDURE — 73700 CT LOWER EXTREMITY W/O DYE: CPT

## 2024-02-22 PROCEDURE — 72192 CT PELVIS W/O DYE: CPT

## 2024-02-22 PROCEDURE — 80053 COMPREHEN METABOLIC PANEL: CPT | Performed by: PHYSICIAN ASSISTANT

## 2024-02-22 PROCEDURE — 71045 X-RAY EXAM CHEST 1 VIEW: CPT

## 2024-02-22 PROCEDURE — 74177 CT ABD & PELVIS W/CONTRAST: CPT

## 2024-02-22 PROCEDURE — 25010000002 CEFEPIME PER 500 MG: Performed by: PHYSICIAN ASSISTANT

## 2024-02-22 PROCEDURE — 73502 X-RAY EXAM HIP UNI 2-3 VIEWS: CPT

## 2024-02-22 PROCEDURE — 36415 COLL VENOUS BLD VENIPUNCTURE: CPT

## 2024-02-22 PROCEDURE — 73700 CT LOWER EXTREMITY W/O DYE: CPT | Performed by: RADIOLOGY

## 2024-02-22 PROCEDURE — 99223 1ST HOSP IP/OBS HIGH 75: CPT

## 2024-02-22 PROCEDURE — 83050 HGB METHEMOGLOBIN QUAN: CPT

## 2024-02-22 PROCEDURE — 25510000001 IOPAMIDOL PER 1 ML: Performed by: STUDENT IN AN ORGANIZED HEALTH CARE EDUCATION/TRAINING PROGRAM

## 2024-02-22 PROCEDURE — 87040 BLOOD CULTURE FOR BACTERIA: CPT | Performed by: PHYSICIAN ASSISTANT

## 2024-02-22 PROCEDURE — 74177 CT ABD & PELVIS W/CONTRAST: CPT | Performed by: RADIOLOGY

## 2024-02-22 PROCEDURE — 36600 WITHDRAWAL OF ARTERIAL BLOOD: CPT

## 2024-02-22 PROCEDURE — 99285 EMERGENCY DEPT VISIT HI MDM: CPT

## 2024-02-22 PROCEDURE — 84145 PROCALCITONIN (PCT): CPT | Performed by: PHYSICIAN ASSISTANT

## 2024-02-22 PROCEDURE — 81003 URINALYSIS AUTO W/O SCOPE: CPT | Performed by: PHYSICIAN ASSISTANT

## 2024-02-22 PROCEDURE — 83605 ASSAY OF LACTIC ACID: CPT | Performed by: PHYSICIAN ASSISTANT

## 2024-02-22 PROCEDURE — 86140 C-REACTIVE PROTEIN: CPT | Performed by: PHYSICIAN ASSISTANT

## 2024-02-22 PROCEDURE — 72192 CT PELVIS W/O DYE: CPT | Performed by: RADIOLOGY

## 2024-02-22 PROCEDURE — 73721 MRI JNT OF LWR EXTRE W/O DYE: CPT | Performed by: RADIOLOGY

## 2024-02-22 PROCEDURE — 25010000002 VANCOMYCIN 5 G RECONSTITUTED SOLUTION: Performed by: PHYSICIAN ASSISTANT

## 2024-02-22 PROCEDURE — 82375 ASSAY CARBOXYHB QUANT: CPT

## 2024-02-22 PROCEDURE — 71045 X-RAY EXAM CHEST 1 VIEW: CPT | Performed by: RADIOLOGY

## 2024-02-22 PROCEDURE — 84484 ASSAY OF TROPONIN QUANT: CPT | Performed by: PHYSICIAN ASSISTANT

## 2024-02-22 PROCEDURE — 94640 AIRWAY INHALATION TREATMENT: CPT

## 2024-02-22 PROCEDURE — 25810000003 SODIUM CHLORIDE 0.9 % SOLUTION: Performed by: PHYSICIAN ASSISTANT

## 2024-02-22 PROCEDURE — 71275 CT ANGIOGRAPHY CHEST: CPT | Performed by: RADIOLOGY

## 2024-02-22 PROCEDURE — 87636 SARSCOV2 & INF A&B AMP PRB: CPT | Performed by: PHYSICIAN ASSISTANT

## 2024-02-22 PROCEDURE — 84443 ASSAY THYROID STIM HORMONE: CPT | Performed by: STUDENT IN AN ORGANIZED HEALTH CARE EDUCATION/TRAINING PROGRAM

## 2024-02-22 PROCEDURE — 84550 ASSAY OF BLOOD/URIC ACID: CPT | Performed by: STUDENT IN AN ORGANIZED HEALTH CARE EDUCATION/TRAINING PROGRAM

## 2024-02-22 PROCEDURE — 70450 CT HEAD/BRAIN W/O DYE: CPT

## 2024-02-22 PROCEDURE — 93010 ELECTROCARDIOGRAM REPORT: CPT | Performed by: INTERNAL MEDICINE

## 2024-02-22 RX ORDER — AMOXICILLIN 250 MG
2 CAPSULE ORAL 2 TIMES DAILY PRN
Status: DISCONTINUED | OUTPATIENT
Start: 2024-02-22 | End: 2024-02-28 | Stop reason: HOSPADM

## 2024-02-22 RX ORDER — SODIUM CHLORIDE 0.9 % (FLUSH) 0.9 %
10 SYRINGE (ML) INJECTION EVERY 12 HOURS SCHEDULED
Status: DISCONTINUED | OUTPATIENT
Start: 2024-02-22 | End: 2024-02-28 | Stop reason: HOSPADM

## 2024-02-22 RX ORDER — GLUCAGON 1 MG/ML
1 KIT INJECTION
Status: DISCONTINUED | OUTPATIENT
Start: 2024-02-22 | End: 2024-02-28 | Stop reason: HOSPADM

## 2024-02-22 RX ORDER — PROCHLORPERAZINE EDISYLATE 5 MG/ML
5 INJECTION INTRAMUSCULAR; INTRAVENOUS EVERY 6 HOURS PRN
Status: DISCONTINUED | OUTPATIENT
Start: 2024-02-22 | End: 2024-02-28 | Stop reason: HOSPADM

## 2024-02-22 RX ORDER — SODIUM CHLORIDE 0.9 % (FLUSH) 0.9 %
10 SYRINGE (ML) INJECTION AS NEEDED
Status: DISCONTINUED | OUTPATIENT
Start: 2024-02-22 | End: 2024-02-28 | Stop reason: HOSPADM

## 2024-02-22 RX ORDER — NICOTINE 21 MG/24HR
1 PATCH, TRANSDERMAL 24 HOURS TRANSDERMAL
Status: DISCONTINUED | OUTPATIENT
Start: 2024-02-23 | End: 2024-02-28 | Stop reason: HOSPADM

## 2024-02-22 RX ORDER — MORPHINE SULFATE 2 MG/ML
1 INJECTION, SOLUTION INTRAMUSCULAR; INTRAVENOUS
Status: DISCONTINUED | OUTPATIENT
Start: 2024-02-22 | End: 2024-02-24

## 2024-02-22 RX ORDER — FUROSEMIDE 10 MG/ML
80 INJECTION INTRAMUSCULAR; INTRAVENOUS ONCE
Status: COMPLETED | OUTPATIENT
Start: 2024-02-22 | End: 2024-02-22

## 2024-02-22 RX ORDER — SODIUM CHLORIDE 9 MG/ML
40 INJECTION, SOLUTION INTRAVENOUS AS NEEDED
Status: DISCONTINUED | OUTPATIENT
Start: 2024-02-22 | End: 2024-02-28 | Stop reason: HOSPADM

## 2024-02-22 RX ORDER — DEXTROSE MONOHYDRATE 25 G/50ML
25 INJECTION, SOLUTION INTRAVENOUS
Status: DISCONTINUED | OUTPATIENT
Start: 2024-02-22 | End: 2024-02-28 | Stop reason: HOSPADM

## 2024-02-22 RX ORDER — BISACODYL 10 MG
10 SUPPOSITORY, RECTAL RECTAL DAILY PRN
Status: DISCONTINUED | OUTPATIENT
Start: 2024-02-22 | End: 2024-02-28 | Stop reason: HOSPADM

## 2024-02-22 RX ORDER — IPRATROPIUM BROMIDE AND ALBUTEROL SULFATE 2.5; .5 MG/3ML; MG/3ML
3 SOLUTION RESPIRATORY (INHALATION) ONCE
Status: COMPLETED | OUTPATIENT
Start: 2024-02-22 | End: 2024-02-22

## 2024-02-22 RX ORDER — BISACODYL 5 MG/1
5 TABLET, DELAYED RELEASE ORAL DAILY PRN
Status: DISCONTINUED | OUTPATIENT
Start: 2024-02-22 | End: 2024-02-28 | Stop reason: HOSPADM

## 2024-02-22 RX ORDER — NICOTINE POLACRILEX 4 MG
15 LOZENGE BUCCAL
Status: DISCONTINUED | OUTPATIENT
Start: 2024-02-22 | End: 2024-02-28 | Stop reason: HOSPADM

## 2024-02-22 RX ORDER — POLYETHYLENE GLYCOL 3350 17 G/17G
17 POWDER, FOR SOLUTION ORAL DAILY PRN
Status: DISCONTINUED | OUTPATIENT
Start: 2024-02-22 | End: 2024-02-28 | Stop reason: HOSPADM

## 2024-02-22 RX ADMIN — IPRATROPIUM BROMIDE AND ALBUTEROL SULFATE 3 ML: 2.5; .5 SOLUTION RESPIRATORY (INHALATION) at 14:49

## 2024-02-22 RX ADMIN — Medication 10 ML: at 22:31

## 2024-02-22 RX ADMIN — CEFEPIME 2000 MG: 2 INJECTION, POWDER, FOR SOLUTION INTRAVENOUS at 17:07

## 2024-02-22 RX ADMIN — FUROSEMIDE 80 MG: 10 INJECTION, SOLUTION INTRAMUSCULAR; INTRAVENOUS at 17:05

## 2024-02-22 RX ADMIN — IOPAMIDOL 100 ML: 755 INJECTION, SOLUTION INTRAVENOUS at 16:41

## 2024-02-22 RX ADMIN — VANCOMYCIN HYDROCHLORIDE 1250 MG: 5 INJECTION, POWDER, LYOPHILIZED, FOR SOLUTION INTRAVENOUS at 17:31

## 2024-02-22 RX ADMIN — SODIUM CHLORIDE 1000 ML: 9 INJECTION, SOLUTION INTRAVENOUS at 15:20

## 2024-02-22 NOTE — ED PROVIDER NOTES
"Subjective   History of Present Illness  68-year-old female with past medical history of arthritis, diabetes, hyperlipidemia, hypertension, and Alzheimer's presents to the emergency room accompanied by family member who reports that patient fell on Tuesday and since that time has had increasing confusion, however states that patient did not hit her head during the fall.  Patient states that she is unsure of how she fell and states that she \"just falls all the time\".  Family member who is present at bedside states that they have a camera in patient's home and states the fall was unwitnessed states that when patient gets up she will stay in there for several minutes and after any motion that the camera kicks off and that is usually when patient falls.  Family member at bedside states that patient was supposed to move in with her this day, however states that since her fall on Tuesday she has been unable to walk and has been complaining of right hip pain.  She does state that patient has been off her Alzheimer medication x 1 month due to GI upset and thought that it may be related to her Alzheimer's medications however does not know that for certain.  Patient denies headache, chest pain, abdominal pain, shortness of breath, or back pain.  She denies any specific aggravating or alleviating factors.  Denies any other complaints or concerns at this time.    History provided by:  Patient and relative   used: No        Review of Systems   Constitutional: Negative.  Negative for fever.   HENT: Negative.     Respiratory: Negative.     Cardiovascular: Negative.  Negative for chest pain.   Gastrointestinal: Negative.  Negative for abdominal pain.   Endocrine: Negative.    Genitourinary: Negative.  Negative for dysuria.   Musculoskeletal:         (+) right hip pain   Skin: Negative.    Neurological: Negative.    Psychiatric/Behavioral:  Positive for confusion.    All other systems reviewed and are " negative.      Past Medical History:   Diagnosis Date    Arthritis     Diabetes mellitus     Hyperlipidemia     Hypertension     Multiple rib fractures 2000       Allergies   Allergen Reactions    Penicillins        No past surgical history on file.    Family History   Problem Relation Age of Onset    Breast cancer Neg Hx        Social History     Socioeconomic History    Marital status: Single   Tobacco Use    Smoking status: Every Day     Packs/day: 0.25     Years: 40.00     Additional pack years: 0.00     Total pack years: 10.00     Types: Electronic Cigarette, Cigarettes   Substance and Sexual Activity    Alcohol use: No    Drug use: No    Sexual activity: Defer           Objective   Physical Exam  Vitals and nursing note reviewed.   Constitutional:       General: She is not in acute distress.     Appearance: She is well-developed. She is not diaphoretic.   HENT:      Head: Normocephalic and atraumatic.      Right Ear: External ear normal.      Left Ear: External ear normal.      Nose: Nose normal.   Eyes:      Conjunctiva/sclera: Conjunctivae normal.      Pupils: Pupils are equal, round, and reactive to light.   Neck:      Vascular: No JVD.      Trachea: No tracheal deviation.   Cardiovascular:      Rate and Rhythm: Normal rate and regular rhythm.      Heart sounds: Normal heart sounds. No murmur heard.  Pulmonary:      Effort: Pulmonary effort is normal. No respiratory distress.      Breath sounds: Normal breath sounds. No wheezing.   Abdominal:      General: Bowel sounds are normal.      Palpations: Abdomen is soft.      Tenderness: There is no abdominal tenderness.   Musculoskeletal:         General: No deformity. Normal range of motion.      Cervical back: Normal, normal range of motion and neck supple.      Thoracic back: Normal.      Lumbar back: Normal.      Right hip: Normal.      Left hip: Normal.      Comments: C/o right hip pain, however non-tender to palpation with ROM   Skin:     General: Skin is  warm and dry.      Coloration: Skin is not pale.      Findings: No erythema or rash.   Neurological:      General: No focal deficit present.      Mental Status: She is alert. She is confused.      Cranial Nerves: No cranial nerve deficit.      Gait: Gait is intact.      Comments: Pt is ambulatory   Psychiatric:         Behavior: Behavior normal.         Thought Content: Thought content normal.         Procedures           ED Course  ED Course as of 02/22/24 2106   Thu Feb 22, 2024   1500 Preliminary EKG interpretation: Sinus tach rate of 106 with a 60 degree axis.  General flipped T waves, without gross ST elevation, further evaluation will be completed by treating provider. Electronically signed by Brian Grullon MD, 02/22/24, 3:01 PM EST.   [GUILLERMO]   1506 XR Chest 1 View [TK]   1506 XR Hip With or Without Pelvis 2 - 3 View Right [TK]   1506 CT Head Without Contrast [TK]   1540 CT Pelvis Without Contrast [TK]   1541 CT Lower Extremity Right Without Contrast [TK]   1610 D-Dimer, Quant(!): 1.28 [TK]   1717 Spoke with Dr. Burton regarding CT/xray findings of right hip and he recommends MRI to verify. [TK]   1718 CT Angiogram Chest Pulmonary Embolism [TK]   1719 CT Abdomen Pelvis With Contrast [TK]   1741 Hospitalist paged [TK]   1754 Spoke with Dr. Alonso he will come see and evaluate patient. [TK]   1815 Hospitalist has seen and evaluated patient. Will await MRI, if non-surgical patient prefers to go home. Believes leukocytosis to be secondary to fracture. [TK]   2020 MRI Hip Right Without Contrast [TK]   2045 Spoke with Dr. Burton, recommends admission for surgery. [TK]   2058 Spoke with Dr. Whitmore, she will admit patient to hospitalist services. [TK]      ED Course User Index  [GUILLERMO] Brian Grullon MD  [TK] Gabriela Savage, PANeetuC                                   Results for orders placed or performed during the hospital encounter of 02/22/24   COVID-19 and FLU A/B PCR, 1 HR TAT - Swab, Nasopharynx    Specimen:  Nasopharynx; Swab   Result Value Ref Range    COVID19 Not Detected Not Detected - Ref. Range    Influenza A PCR Not Detected Not Detected    Influenza B PCR Not Detected Not Detected   Comprehensive Metabolic Panel    Specimen: Blood   Result Value Ref Range    Glucose 91 65 - 99 mg/dL    BUN 27 (H) 8 - 23 mg/dL    Creatinine 0.94 0.57 - 1.00 mg/dL    Sodium 138 136 - 145 mmol/L    Potassium 4.0 3.5 - 5.2 mmol/L    Chloride 103 98 - 107 mmol/L    CO2 22.6 22.0 - 29.0 mmol/L    Calcium 9.6 8.6 - 10.5 mg/dL    Total Protein 6.9 6.0 - 8.5 g/dL    Albumin 3.8 3.5 - 5.2 g/dL    ALT (SGPT) 18 1 - 33 U/L    AST (SGOT) 21 1 - 32 U/L    Alkaline Phosphatase 88 39 - 117 U/L    Total Bilirubin 0.3 0.0 - 1.2 mg/dL    Globulin 3.1 gm/dL    A/G Ratio 1.2 g/dL    BUN/Creatinine Ratio 28.7 (H) 7.0 - 25.0    Anion Gap 12.4 5.0 - 15.0 mmol/L    eGFR 66.6 >60.0 mL/min/1.73   Urinalysis With Culture If Indicated - Urine, Clean Catch    Specimen: Urine, Clean Catch   Result Value Ref Range    Color, UA Yellow Yellow, Straw    Appearance, UA Clear Clear    pH, UA 5.5 5.0 - 8.0    Specific Gravity, UA 1.025 1.005 - 1.030    Glucose,  mg/dL (Trace) (A) Negative    Ketones, UA Negative Negative    Bilirubin, UA Negative Negative    Blood, UA Negative Negative    Protein, UA Negative Negative    Leuk Esterase, UA Negative Negative    Nitrite, UA Negative Negative    Urobilinogen, UA 0.2 E.U./dL 0.2 - 1.0 E.U./dL   CBC Auto Differential    Specimen: Blood   Result Value Ref Range    WBC 13.08 (H) 3.40 - 10.80 10*3/mm3    RBC 4.72 3.77 - 5.28 10*6/mm3    Hemoglobin 13.6 12.0 - 15.9 g/dL    Hematocrit 42.1 34.0 - 46.6 %    MCV 89.2 79.0 - 97.0 fL    MCH 28.8 26.6 - 33.0 pg    MCHC 32.3 31.5 - 35.7 g/dL    RDW 17.9 (H) 12.3 - 15.4 %    RDW-SD 58.3 (H) 37.0 - 54.0 fl    MPV 9.7 6.0 - 12.0 fL    Platelets 336 140 - 450 10*3/mm3    Neutrophil % 65.1 42.7 - 76.0 %    Lymphocyte % 25.2 19.6 - 45.3 %    Monocyte % 6.4 5.0 - 12.0 %    Eosinophil  % 2.6 0.3 - 6.2 %    Basophil % 0.3 0.0 - 1.5 %    Immature Grans % 0.4 0.0 - 0.5 %    Neutrophils, Absolute 8.51 (H) 1.70 - 7.00 10*3/mm3    Lymphocytes, Absolute 3.30 (H) 0.70 - 3.10 10*3/mm3    Monocytes, Absolute 0.84 0.10 - 0.90 10*3/mm3    Eosinophils, Absolute 0.34 0.00 - 0.40 10*3/mm3    Basophils, Absolute 0.04 0.00 - 0.20 10*3/mm3    Immature Grans, Absolute 0.05 0.00 - 0.05 10*3/mm3    nRBC 0.0 0.0 - 0.2 /100 WBC   C-reactive Protein    Specimen: Blood   Result Value Ref Range    C-Reactive Protein 8.72 (H) 0.00 - 0.50 mg/dL   Procalcitonin    Specimen: Blood   Result Value Ref Range    Procalcitonin 0.14 0.00 - 0.25 ng/mL   BNP    Specimen: Blood   Result Value Ref Range    proBNP 22,342.0 (H) 0.0 - 900.0 pg/mL   Blood Gas, Arterial With Co-Ox    Specimen: Arterial Blood   Result Value Ref Range    Site Left Brachial     Christian's Test N/A     pH, Arterial 7.435 7.350 - 7.450 pH units    pCO2, Arterial 31.5 (L) 35.0 - 45.0 mm Hg    pO2, Arterial 53.6 (C) 83.0 - 108.0 mm Hg    HCO3, Arterial 21.1 20.0 - 26.0 mmol/L    Base Excess, Arterial -2.2 (L) 0.0 - 2.0 mmol/L    O2 Saturation, Arterial 88.8 (L) 94.0 - 99.0 %    Hemoglobin, Blood Gas 13.9 13.5 - 17.5 g/dL    Hematocrit, Blood Gas 42.8 38.0 - 51.0 %    Oxyhemoglobin 86.0 (L) 94 - 99 %    Methemoglobin 0.00 0.00 - 3.00 %    Carboxyhemoglobin 3.2 0 - 5 %    A-a DO2 52.0 0.0 - 300.0 mmHg    CO2 Content 22.1 22 - 33 mmol/L    Barometric Pressure for Blood Gas 719 mmHg    Modality Room Air     FIO2 21 %    Ventilator Mode NA     Notified Who ER PA AND RN     Notified By [id number]     Notified Time 02/22/2024 14:24     Collected by [id number]     pH, Temp Corrected      pCO2, Temperature Corrected      pO2, Temperature Corrected     D-dimer, Quantitative    Specimen: Blood   Result Value Ref Range    D-Dimer, Quantitative 1.28 (H) 0.00 - 0.67 MCGFEU/mL   Lactic Acid, Plasma    Specimen: Arm, Right; Blood   Result Value Ref Range    Lactate 1.2 0.5 - 2.0 mmol/L    High Sensitivity Troponin T    Specimen: Blood   Result Value Ref Range    HS Troponin T 53 (C) <14 ng/L   Uric Acid    Specimen: Blood   Result Value Ref Range    Uric Acid 6.6 (H) 2.4 - 5.7 mg/dL   TSH    Specimen: Blood   Result Value Ref Range    TSH 0.915 0.270 - 4.200 uIU/mL   High Sensitivity Troponin T 2Hr    Specimen: Arm, Right; Blood   Result Value Ref Range    HS Troponin T 50 (H) <14 ng/L    Troponin T Delta -3 >=-4 - <+4 ng/L   ECG 12 Lead Tachycardia   Result Value Ref Range    QT Interval 390 ms    QTC Interval 512 ms   Green Top (Gel)   Result Value Ref Range    Extra Tube Hold for add-ons.    Lavender Top   Result Value Ref Range    Extra Tube hold for add-on    Gold Top - SST   Result Value Ref Range    Extra Tube Hold for add-ons.    Light Blue Top   Result Value Ref Range    Extra Tube Hold for add-ons.        MRI Hip Right Without Contrast   Final Result   Impression:   1. Right intertrochanteric fracture.   2. Edema right hip with tear the right gluteus medius muscle.           This report was finalized on 2/22/2024 7:08 PM by Ric Bowman DO.          CT Abdomen Pelvis With Contrast   Final Result   No acute intra-abdominal or pelvic process.           This report was finalized on 2/22/2024 4:53 PM by Kayla Canales M.D..          CT Angiogram Chest Pulmonary Embolism   Final Result   1. No evidence of pulmonary embolus.   2. Fibrotic lung changes.                   This report was finalized on 2/22/2024 4:50 PM by Kayla Canales M.D..          CT Lower Extremity Right Without Contrast   Final Result   Fracture of the right greater trochanter.                   This report was finalized on 2/22/2024 3:35 PM by Kayla Canales M.D..          CT Pelvis Without Contrast   Final Result   Fracture of the right greater trochanter.                   This report was finalized on 2/22/2024 3:34 PM by Kayla Canales M.D..          XR Hip With or Without Pelvis 2 - 3 View  "Right   Final Result   Possible nondisplaced fracture of the right greater   trochanter. CT of the right hip may prove useful in further evaluation                   This report was finalized on 2/22/2024 2:41 PM by Kayla Canales M.D..          XR Chest 1 View   Final Result   Fibrotic lung changes with no acute cardiopulmonary process.           This report was finalized on 2/22/2024 2:40 PM by Kayla Canales M.D..          CT Head Without Contrast   Final Result   No acute intracranial process.                   This report was finalized on 2/22/2024 2:42 PM by Kayla Canales M.D..                        Medical Decision Making  68-year-old female with past medical history of arthritis, diabetes, hyperlipidemia, hypertension, and Alzheimer's presents to the emergency room accompanied by family member who reports that patient fell on Tuesday and since that time has had increasing confusion, however states that patient did not hit her head during the fall.  Patient states that she is unsure of how she fell and states that she \"just falls all the time\".  Family member who is present at bedside states that they have a camera in patient's home and states the fall was unwitnessed states that when patient gets up she will stay in there for several minutes and after any motion that the camera kicks off and that is usually when patient falls.  Family member at bedside states that patient was supposed to move in with her this day, however states that since her fall on Tuesday she has been unable to walk and has been complaining of right hip pain.  She does state that patient has been off her Alzheimer medication x 1 month due to GI upset and thought that it may be related to her Alzheimer's medications however does not know that for certain.  Patient denies headache, chest pain, abdominal pain, shortness of breath, or back pain.  She denies any specific aggravating or alleviating factors.  Denies any " other complaints or concerns at this time.      Problems Addressed:  Acute on chronic respiratory failure with hypoxia: complicated acute illness or injury  Closed nondisplaced fracture of greater trochanter of right femur, initial encounter: complicated acute illness or injury  COPD with acute exacerbation: complicated acute illness or injury  Hypervolemia, unspecified hypervolemia type: complicated acute illness or injury  Sepsis, due to unspecified organism, unspecified whether acute organ dysfunction present: complicated acute illness or injury    Amount and/or Complexity of Data Reviewed  Labs: ordered. Decision-making details documented in ED Course.  Radiology: ordered. Decision-making details documented in ED Course.  ECG/medicine tests: ordered. Decision-making details documented in ED Course.  Discussion of management or test interpretation with external provider(s): Micheal - orthopedic surgery  Gavin/Myranda - hospitalist    Risk  Prescription drug management.  Decision regarding hospitalization.        Final diagnoses:   Closed nondisplaced fracture of greater trochanter of right femur, initial encounter   Acute on chronic respiratory failure with hypoxia   Hypervolemia, unspecified hypervolemia type   COPD with acute exacerbation   Sepsis, due to unspecified organism, unspecified whether acute organ dysfunction present       ED Disposition  ED Disposition       ED Disposition   Decision to Admit    Condition   --    Comment   Level of Care: Med/Surg [1]   Diagnosis: Intertrochanteric fracture of right femur [286372]   Admitting Physician: VILMA DONNELLY [[id number]]   Certification: I Certify That Inpatient Hospital Services Are Medically Necessary For Greater Than 2 Midnights                 No follow-up provider specified.       Medication List      No changes were made to your prescriptions during this visit.            Gabriela Savage PA-C  02/22/24 [id number]

## 2024-02-23 ENCOUNTER — ANESTHESIA EVENT (OUTPATIENT)
Dept: PERIOP | Facility: HOSPITAL | Age: 69
End: 2024-02-23
Payer: MEDICARE

## 2024-02-23 ENCOUNTER — APPOINTMENT (OUTPATIENT)
Dept: CARDIOLOGY | Facility: HOSPITAL | Age: 69
End: 2024-02-23
Payer: MEDICARE

## 2024-02-23 ENCOUNTER — APPOINTMENT (OUTPATIENT)
Dept: GENERAL RADIOLOGY | Facility: HOSPITAL | Age: 69
End: 2024-02-23
Payer: MEDICARE

## 2024-02-23 LAB
ABO GROUP BLD: NORMAL
ABO GROUP BLD: NORMAL
ALBUMIN SERPL-MCNC: 3.8 G/DL (ref 3.5–5.2)
ALBUMIN/GLOB SERPL: 1.3 G/DL
ALP SERPL-CCNC: 84 U/L (ref 39–117)
ALT SERPL W P-5'-P-CCNC: 16 U/L (ref 1–33)
ANION GAP SERPL CALCULATED.3IONS-SCNC: 13.6 MMOL/L (ref 5–15)
AST SERPL-CCNC: 18 U/L (ref 1–32)
BASOPHILS # BLD AUTO: 0.05 10*3/MM3 (ref 0–0.2)
BASOPHILS NFR BLD AUTO: 0.5 % (ref 0–1.5)
BILIRUB SERPL-MCNC: 0.4 MG/DL (ref 0–1.2)
BLD GP AB SCN SERPL QL: NEGATIVE
BUN SERPL-MCNC: 26 MG/DL (ref 8–23)
BUN/CREAT SERPL: 27.1 (ref 7–25)
CALCIUM SPEC-SCNC: 9.6 MG/DL (ref 8.6–10.5)
CHLORIDE SERPL-SCNC: 101 MMOL/L (ref 98–107)
CO2 SERPL-SCNC: 25.4 MMOL/L (ref 22–29)
CREAT SERPL-MCNC: 0.96 MG/DL (ref 0.57–1)
DEPRECATED RDW RBC AUTO: 57.5 FL (ref 37–54)
EGFRCR SERPLBLD CKD-EPI 2021: 65 ML/MIN/1.73
EOSINOPHIL # BLD AUTO: 0.39 10*3/MM3 (ref 0–0.4)
EOSINOPHIL NFR BLD AUTO: 3.7 % (ref 0.3–6.2)
ERYTHROCYTE [DISTWIDTH] IN BLOOD BY AUTOMATED COUNT: 17.9 % (ref 12.3–15.4)
GLOBULIN UR ELPH-MCNC: 3 GM/DL
GLUCOSE BLDC GLUCOMTR-MCNC: 114 MG/DL (ref 70–130)
GLUCOSE BLDC GLUCOMTR-MCNC: 221 MG/DL (ref 70–130)
GLUCOSE BLDC GLUCOMTR-MCNC: 57 MG/DL (ref 70–130)
GLUCOSE BLDC GLUCOMTR-MCNC: 98 MG/DL (ref 70–130)
GLUCOSE BLDC GLUCOMTR-MCNC: 99 MG/DL (ref 70–130)
GLUCOSE SERPL-MCNC: 155 MG/DL (ref 65–99)
HCT VFR BLD AUTO: 41.5 % (ref 34–46.6)
HGB BLD-MCNC: 13.5 G/DL (ref 12–15.9)
IMM GRANULOCYTES # BLD AUTO: 0.04 10*3/MM3 (ref 0–0.05)
IMM GRANULOCYTES NFR BLD AUTO: 0.4 % (ref 0–0.5)
INR PPP: 1.03 (ref 0.9–1.1)
LYMPHOCYTES # BLD AUTO: 2.88 10*3/MM3 (ref 0.7–3.1)
LYMPHOCYTES NFR BLD AUTO: 27.3 % (ref 19.6–45.3)
MCH RBC QN AUTO: 28.8 PG (ref 26.6–33)
MCHC RBC AUTO-ENTMCNC: 32.5 G/DL (ref 31.5–35.7)
MCV RBC AUTO: 88.5 FL (ref 79–97)
MONOCYTES # BLD AUTO: 0.77 10*3/MM3 (ref 0.1–0.9)
MONOCYTES NFR BLD AUTO: 7.3 % (ref 5–12)
NEUTROPHILS NFR BLD AUTO: 6.42 10*3/MM3 (ref 1.7–7)
NEUTROPHILS NFR BLD AUTO: 60.8 % (ref 42.7–76)
NRBC BLD AUTO-RTO: 0 /100 WBC (ref 0–0.2)
PLATELET # BLD AUTO: 341 10*3/MM3 (ref 140–450)
PMV BLD AUTO: 9.9 FL (ref 6–12)
POTASSIUM SERPL-SCNC: 3.8 MMOL/L (ref 3.5–5.2)
PROT SERPL-MCNC: 6.8 G/DL (ref 6–8.5)
PROTHROMBIN TIME: 14 SECONDS (ref 12.1–14.7)
RBC # BLD AUTO: 4.69 10*6/MM3 (ref 3.77–5.28)
RH BLD: POSITIVE
RH BLD: POSITIVE
SODIUM SERPL-SCNC: 140 MMOL/L (ref 136–145)
T&S EXPIRATION DATE: NORMAL
WBC NRBC COR # BLD AUTO: 10.55 10*3/MM3 (ref 3.4–10.8)

## 2024-02-23 PROCEDURE — 85610 PROTHROMBIN TIME: CPT | Performed by: INTERNAL MEDICINE

## 2024-02-23 PROCEDURE — 86850 RBC ANTIBODY SCREEN: CPT | Performed by: INTERNAL MEDICINE

## 2024-02-23 PROCEDURE — 25010000002 MORPHINE PER 10 MG: Performed by: INTERNAL MEDICINE

## 2024-02-23 PROCEDURE — 86900 BLOOD TYPING SEROLOGIC ABO: CPT | Performed by: INTERNAL MEDICINE

## 2024-02-23 PROCEDURE — 82948 REAGENT STRIP/BLOOD GLUCOSE: CPT

## 2024-02-23 PROCEDURE — 94799 UNLISTED PULMONARY SVC/PX: CPT

## 2024-02-23 PROCEDURE — 73552 X-RAY EXAM OF FEMUR 2/>: CPT | Performed by: RADIOLOGY

## 2024-02-23 PROCEDURE — 86900 BLOOD TYPING SEROLOGIC ABO: CPT

## 2024-02-23 PROCEDURE — 99233 SBSQ HOSP IP/OBS HIGH 50: CPT | Performed by: STUDENT IN AN ORGANIZED HEALTH CARE EDUCATION/TRAINING PROGRAM

## 2024-02-23 PROCEDURE — 94664 DEMO&/EVAL PT USE INHALER: CPT

## 2024-02-23 PROCEDURE — 94761 N-INVAS EAR/PLS OXIMETRY MLT: CPT

## 2024-02-23 PROCEDURE — 86901 BLOOD TYPING SEROLOGIC RH(D): CPT | Performed by: INTERNAL MEDICINE

## 2024-02-23 PROCEDURE — 86901 BLOOD TYPING SEROLOGIC RH(D): CPT

## 2024-02-23 PROCEDURE — 73552 X-RAY EXAM OF FEMUR 2/>: CPT

## 2024-02-23 PROCEDURE — 25010000002 ENOXAPARIN PER 10 MG: Performed by: STUDENT IN AN ORGANIZED HEALTH CARE EDUCATION/TRAINING PROGRAM

## 2024-02-23 PROCEDURE — 93306 TTE W/DOPPLER COMPLETE: CPT | Performed by: INTERNAL MEDICINE

## 2024-02-23 PROCEDURE — 93306 TTE W/DOPPLER COMPLETE: CPT

## 2024-02-23 PROCEDURE — 85025 COMPLETE CBC W/AUTO DIFF WBC: CPT | Performed by: INTERNAL MEDICINE

## 2024-02-23 PROCEDURE — 80053 COMPREHEN METABOLIC PANEL: CPT | Performed by: INTERNAL MEDICINE

## 2024-02-23 RX ORDER — PANTOPRAZOLE SODIUM 40 MG/1
40 TABLET, DELAYED RELEASE ORAL
Status: CANCELLED | OUTPATIENT
Start: 2024-02-24

## 2024-02-23 RX ORDER — FLUOXETINE HYDROCHLORIDE 20 MG/1
40 CAPSULE ORAL DAILY
Status: CANCELLED | OUTPATIENT
Start: 2024-02-23

## 2024-02-23 RX ORDER — IPRATROPIUM BROMIDE AND ALBUTEROL SULFATE 2.5; .5 MG/3ML; MG/3ML
3 SOLUTION RESPIRATORY (INHALATION)
Status: DISCONTINUED | OUTPATIENT
Start: 2024-02-23 | End: 2024-02-25

## 2024-02-23 RX ORDER — MONTELUKAST SODIUM 10 MG/1
10 TABLET ORAL NIGHTLY
Status: DISCONTINUED | OUTPATIENT
Start: 2024-02-23 | End: 2024-02-28 | Stop reason: HOSPADM

## 2024-02-23 RX ORDER — HYDROCODONE BITARTRATE AND ACETAMINOPHEN 10; 325 MG/1; MG/1
1 TABLET ORAL EVERY 8 HOURS PRN
COMMUNITY

## 2024-02-23 RX ORDER — INSULIN LISPRO 100 [IU]/ML
2-7 INJECTION, SOLUTION INTRAVENOUS; SUBCUTANEOUS
Status: DISCONTINUED | OUTPATIENT
Start: 2024-02-23 | End: 2024-02-28 | Stop reason: HOSPADM

## 2024-02-23 RX ORDER — ASPIRIN 81 MG/1
81 TABLET ORAL DAILY
Status: DISCONTINUED | OUTPATIENT
Start: 2024-02-23 | End: 2024-02-28 | Stop reason: HOSPADM

## 2024-02-23 RX ORDER — ROSUVASTATIN CALCIUM 20 MG/1
40 TABLET, COATED ORAL NIGHTLY
Status: CANCELLED | OUTPATIENT
Start: 2024-02-23

## 2024-02-23 RX ORDER — MONTELUKAST SODIUM 10 MG/1
10 TABLET ORAL NIGHTLY
Status: CANCELLED | OUTPATIENT
Start: 2024-02-23

## 2024-02-23 RX ORDER — ASPIRIN 81 MG/1
81 TABLET, CHEWABLE ORAL DAILY
Status: CANCELLED | OUTPATIENT
Start: 2024-02-23

## 2024-02-23 RX ORDER — CETIRIZINE HYDROCHLORIDE 10 MG/1
10 TABLET ORAL DAILY
COMMUNITY

## 2024-02-23 RX ORDER — ROSUVASTATIN CALCIUM 20 MG/1
20 TABLET, COATED ORAL NIGHTLY
Status: DISCONTINUED | OUTPATIENT
Start: 2024-02-23 | End: 2024-02-28 | Stop reason: HOSPADM

## 2024-02-23 RX ORDER — FLUOXETINE HYDROCHLORIDE 40 MG/1
40 CAPSULE ORAL DAILY
COMMUNITY

## 2024-02-23 RX ORDER — ALLOPURINOL 100 MG/1
100 TABLET ORAL DAILY
Status: DISCONTINUED | OUTPATIENT
Start: 2024-02-24 | End: 2024-02-28 | Stop reason: HOSPADM

## 2024-02-23 RX ORDER — OLANZAPINE 5 MG/1
5 TABLET, ORALLY DISINTEGRATING ORAL NIGHTLY
Status: DISCONTINUED | OUTPATIENT
Start: 2024-02-23 | End: 2024-02-24

## 2024-02-23 RX ORDER — AMITRIPTYLINE HYDROCHLORIDE 50 MG/1
100 TABLET, FILM COATED ORAL NIGHTLY
Status: DISCONTINUED | OUTPATIENT
Start: 2024-02-23 | End: 2024-02-28 | Stop reason: HOSPADM

## 2024-02-23 RX ORDER — GLIPIZIDE 5 MG/1
10 TABLET ORAL DAILY
Status: CANCELLED | OUTPATIENT
Start: 2024-02-23

## 2024-02-23 RX ORDER — ENOXAPARIN SODIUM 100 MG/ML
40 INJECTION SUBCUTANEOUS NIGHTLY
Status: DISCONTINUED | OUTPATIENT
Start: 2024-02-23 | End: 2024-02-24 | Stop reason: SDUPTHER

## 2024-02-23 RX ORDER — CETIRIZINE HYDROCHLORIDE 10 MG/1
10 TABLET ORAL DAILY
Status: DISCONTINUED | OUTPATIENT
Start: 2024-02-24 | End: 2024-02-28 | Stop reason: HOSPADM

## 2024-02-23 RX ORDER — ALLOPURINOL 100 MG/1
100 TABLET ORAL DAILY
COMMUNITY

## 2024-02-23 RX ORDER — GLIPIZIDE 10 MG/1
10 TABLET ORAL DAILY
COMMUNITY

## 2024-02-23 RX ORDER — ROSUVASTATIN CALCIUM 40 MG/1
40 TABLET, COATED ORAL NIGHTLY
COMMUNITY

## 2024-02-23 RX ORDER — HYDROCODONE BITARTRATE AND ACETAMINOPHEN 10; 325 MG/1; MG/1
1 TABLET ORAL EVERY 8 HOURS PRN
Status: CANCELLED | OUTPATIENT
Start: 2024-02-23

## 2024-02-23 RX ORDER — MONTELUKAST SODIUM 10 MG/1
10 TABLET ORAL NIGHTLY
COMMUNITY

## 2024-02-23 RX ORDER — AMITRIPTYLINE HYDROCHLORIDE 100 MG/1
100 TABLET ORAL NIGHTLY
COMMUNITY

## 2024-02-23 RX ORDER — ERGOCALCIFEROL 1.25 MG/1
50000 CAPSULE ORAL WEEKLY
COMMUNITY

## 2024-02-23 RX ORDER — BUDESONIDE AND FORMOTEROL FUMARATE DIHYDRATE 160; 4.5 UG/1; UG/1
2 AEROSOL RESPIRATORY (INHALATION)
Status: DISCONTINUED | OUTPATIENT
Start: 2024-02-23 | End: 2024-02-28 | Stop reason: HOSPADM

## 2024-02-23 RX ORDER — AMITRIPTYLINE HYDROCHLORIDE 50 MG/1
100 TABLET, FILM COATED ORAL NIGHTLY
Status: CANCELLED | OUTPATIENT
Start: 2024-02-23

## 2024-02-23 RX ORDER — CETIRIZINE HYDROCHLORIDE 10 MG/1
10 TABLET ORAL DAILY
Status: CANCELLED | OUTPATIENT
Start: 2024-02-23

## 2024-02-23 RX ORDER — OMEPRAZOLE 40 MG/1
40 CAPSULE, DELAYED RELEASE ORAL DAILY
COMMUNITY

## 2024-02-23 RX ORDER — FLUOXETINE HYDROCHLORIDE 20 MG/1
40 CAPSULE ORAL DAILY
Status: DISCONTINUED | OUTPATIENT
Start: 2024-02-24 | End: 2024-02-28 | Stop reason: HOSPADM

## 2024-02-23 RX ORDER — EZETIMIBE 10 MG/1
10 TABLET ORAL DAILY
COMMUNITY

## 2024-02-23 RX ORDER — PANTOPRAZOLE SODIUM 40 MG/1
40 TABLET, DELAYED RELEASE ORAL
Status: DISCONTINUED | OUTPATIENT
Start: 2024-02-24 | End: 2024-02-28 | Stop reason: HOSPADM

## 2024-02-23 RX ORDER — ALLOPURINOL 100 MG/1
100 TABLET ORAL DAILY
Status: CANCELLED | OUTPATIENT
Start: 2024-02-23

## 2024-02-23 RX ADMIN — BUDESONIDE AND FORMOTEROL FUMARATE DIHYDRATE 2 PUFF: 160; 4.5 AEROSOL RESPIRATORY (INHALATION) at 18:36

## 2024-02-23 RX ADMIN — MORPHINE SULFATE 1 MG: 2 INJECTION, SOLUTION INTRAMUSCULAR; INTRAVENOUS at 11:44

## 2024-02-23 RX ADMIN — MORPHINE SULFATE 1 MG: 2 INJECTION, SOLUTION INTRAMUSCULAR; INTRAVENOUS at 21:16

## 2024-02-23 RX ADMIN — OLANZAPINE 5 MG: 5 TABLET, ORALLY DISINTEGRATING ORAL at 21:07

## 2024-02-23 RX ADMIN — ASPIRIN 81 MG: 81 TABLET, COATED ORAL at 14:36

## 2024-02-23 RX ADMIN — ENOXAPARIN SODIUM 40 MG: 40 INJECTION SUBCUTANEOUS at 21:01

## 2024-02-23 RX ADMIN — METOPROLOL TARTRATE 25 MG: 25 TABLET, FILM COATED ORAL at 14:36

## 2024-02-23 RX ADMIN — METOPROLOL TARTRATE 25 MG: 25 TABLET, FILM COATED ORAL at 21:06

## 2024-02-23 RX ADMIN — MORPHINE SULFATE 1 MG: 2 INJECTION, SOLUTION INTRAMUSCULAR; INTRAVENOUS at 02:22

## 2024-02-23 RX ADMIN — AMITRIPTYLINE HYDROCHLORIDE 100 MG: 50 TABLET, FILM COATED ORAL at 21:07

## 2024-02-23 RX ADMIN — Medication 10 ML: at 21:07

## 2024-02-23 RX ADMIN — MONTELUKAST SODIUM 10 MG: 10 TABLET, COATED ORAL at 21:07

## 2024-02-23 RX ADMIN — Medication 10 ML: at 09:13

## 2024-02-23 RX ADMIN — ROSUVASTATIN CALCIUM 20 MG: 20 TABLET, FILM COATED ORAL at 21:07

## 2024-02-23 RX ADMIN — IPRATROPIUM BROMIDE AND ALBUTEROL SULFATE 3 ML: 2.5; .5 SOLUTION RESPIRATORY (INHALATION) at 18:36

## 2024-02-23 RX ADMIN — NICOTINE 1 PATCH: 14 PATCH, EXTENDED RELEASE TRANSDERMAL at 09:12

## 2024-02-23 NOTE — PLAN OF CARE
Goal Outcome Evaluation:  Plan of Care Reviewed With: patient        Progress: no change  Outcome Evaluation: Patient resting in bed at this time. C/O pain this shift; PRN meds given per MAR. Patient is bedrest at this time. VSS. No acute changes noted at this time. Will continue with POC.

## 2024-02-23 NOTE — H&P
"     [facility] Medicine Services  HISTORY & PHYSICAL    Patient Identification:  Name:  Breanne Dickson  Age:  68 y.o.  Sex:  female  :  1955  MRN:  [id number]   Visit Number:  [id number]  Admit Date: 2024   Primary Care Physician:  Mery Coulter DO     Subjective     Chief complaint:   Chief Complaint   Patient presents with    Altered Mental Status     History of presenting illness:   Patient is a 68 y.o. female with past medical history significant for arthritis, diabetes, hypertension, hyperlipidemia, Alzheimer's dementia that presented to the [facility] emergency department for evaluation of altered mental status.     Patient examined at bedside on 3 N. Daughter states the patient has had increasing confusion and frequent falls recently.  Patient was supposed to move in with her daughter today, however since her fall on Tuesday, she has had difficulty walking and has been complaining of right hip pain.  They note the patient has been off her Alzheimer medication for the past month due to GI symptoms that they thought were related to this.  Patient's daughter states that patient lives in a small guest house on their property.  He had a camera to monitor/check in on her.  They state that the patient had a couple falls on Tuesday night and appeared right she \"blacked out\" after standing up, causing her to fall.  Daughter notes they had stopped Trulicity and other diabetic medications due to episodes of hypoglycemia.  She states that she believes they also changed her blood pressure medications after this as well due to hypotension.  Patient is pleasantly confused on my exam.She has no complaints and no hip pain at this time.  She is oriented to self and month but not year, location, or president.  Patient had to be reminded several times that she did break her hip.  Daughter denies any known diagnosis of CHF.  She believes the patient was on Lasix several years ago. "  Denies any known issues with swelling or orthopnea.  She states the patient does have shortness of breath due to smoking.  Patient adamantly denied any tobacco abuse, however, the daughter reminded the patient that she did smoke a cigarette on the way here.    Upon arrival to the ED, vitals were temperature 38.7, , RR 17, /72, SpO2 93% on room air.  Labs significant for initial troponin 53, repeat 50.  proBNP 22,342.  BUN 27, BUN/creatinine ratio 28.7.  D-dimer 1.28.  CRP 8.72.  WBC 13.08 CT head with no acute intracranial process.  CT pelvis/lower extremity shows fracture of right greater trochanter angiogram of left chest shows no PE, fibrotic lung changes.  CT abdomen pelvis showed no acute intra-abdominal/pelvic process.MRI Right hip shows right intertrochanteric fracture.  Edema of right hip with tear of right gluteus medius muscle.    [facility]  ---------------------------------------------------------------------------------------------------------------------   Review of Systems   Unable to perform ROS: Dementia      ---------------------------------------------------------------------------------------------------------------------   Past Medical History:   Diagnosis Date    Arthritis     Diabetes mellitus     Hyperlipidemia     Hypertension     Multiple rib fractures 2000     History reviewed. No pertinent surgical history.  Family History   Problem Relation Age of Onset    Breast cancer Neg Hx      Social History     Socioeconomic History    Marital status: Single   Tobacco Use    Smoking status: Every Day     Packs/day: 0.25     Years: 40.00     Additional pack years: 0.00     Total pack years: 10.00     Types: Electronic Cigarette, Cigarettes   Vaping Use    Vaping Use: Never used   Substance and Sexual Activity    Alcohol use: No    Drug use: No    Sexual activity: Defer      ---------------------------------------------------------------------------------------------------------------------   Allergies:  Penicillins  ---------------------------------------------------------------------------------------------------------------------   Medications below are reported home medications pulling from within the system; at this time, these medications have not been reconciled unless otherwise specified and are in the verification process for further verifcation as current home medications.    Prior to Admission Medications       Prescriptions Last Dose Informant Patient Reported? Taking?    acetaminophen-codeine (TYLENOL #3) 300-30 MG per tablet   No No    Take 1 tablet by mouth Every 6 (Six) Hours As Needed for Moderate Pain .    aspirin 81 MG chewable tablet   Yes No    Chew 81 mg Daily.    cyclobenzaprine (FLEXERIL) 10 MG tablet   No No    Take 1 tablet by mouth 2 (Two) Times a Day As Needed for Muscle Spasms.    FLUoxetine (PROzac) 20 MG capsule   Yes No    Take 40 mg by mouth Daily.    indomethacin (INDOCIN) 50 MG capsule   No No    Take 1 capsule by mouth 3 (Three) Times a Day With Meals.    LISINOPRIL PO   Yes No    Take  by mouth.    metFORMIN (GLUCOPHAGE) 500 MG tablet   Yes No    Take 500 mg by mouth 2 (Two) Times a Day With Meals.    methylPREDNISolone (MEDROL, JORGE,) 4 MG tablet   No No    Take as directed on package instructions.    SIMVASTATIN PO   Yes No    Take  by mouth.          ---------------------------------------------------------------------------------------------------------------------    Objective     Hospital Scheduled Meds:  [START ON 2/23/2024] nicotine, 1 patch, Transdermal, Q24H  sodium chloride, 10 mL, Intravenous, Q12H           Current listed hospital scheduled medications may not yet reflect those currently placed in orders that are signed and held, awaiting patient's arrival to floor/unit.     ---------------------------------------------------------------------------------------------------------------------   Vital Signs:  Temp:  [98 °F (36.7 °C)-98.7 °F (37.1 °C)] 98 °F (36.7 °C)  Heart Rate:  [] 98  Resp:  [17-18] 18  BP: (105-140)/(72-94) 108/80  Mean Arterial Pressure (Non-Invasive) for the past 24 hrs (Last 3 readings):   Noninvasive MAP (mmHg)   02/22/24 2154 93   02/22/24 2115 88   02/22/24 2045 94     SpO2 Percentage    02/22/24 2045 02/22/24 2114 02/22/24 2154   SpO2: 96% 96% 94%     SpO2:  [93 %-97 %] 94 %  on  Flow (L/min):  [1] 1;   Device (Oxygen Therapy): nasal cannula    Body mass index is 26.05 kg/m².  Wt Readings from Last 3 Encounters:   02/22/24 58.5 kg (129 lb)   05/27/19 75.8 kg (167 lb)   12/23/17 79.4 kg (175 lb)     ---------------------------------------------------------------------------------------------------------------------   Physical Exam:  Physical Exam  Constitutional:       General: She is not in acute distress.     Appearance: Normal appearance.   HENT:      Head: Normocephalic and atraumatic.      Right Ear: External ear normal.      Left Ear: External ear normal.      Nose: No nasal deformity.      Mouth/Throat:      Lips: Pink.      Mouth: Mucous membranes are moist.   Eyes:      Conjunctiva/sclera: Conjunctivae normal.      Pupils: Pupils are equal, round, and reactive to light.   Cardiovascular:      Rate and Rhythm: Normal rate and regular rhythm.      Pulses:           Dorsalis pedis pulses are 2+ on the right side and 2+ on the left side.      Heart sounds: Normal heart sounds.   Pulmonary:      Effort: Pulmonary effort is normal.      Breath sounds: Normal breath sounds. No wheezing, rhonchi or rales.   Abdominal:      General: Abdomen is flat. Bowel sounds are normal.      Palpations: Abdomen is soft.      Tenderness: There is no guarding or rebound.   Genitourinary:     Comments: No hayes catheter in place   Musculoskeletal:      Cervical back:  Neck supple. Normal range of motion.      Right hip: No deformity, tenderness or bony tenderness. Normal range of motion.      Left hip: No deformity, tenderness or bony tenderness. Normal range of motion.      Right lower leg: No edema.      Left lower leg: No edema.   Skin:     General: Skin is warm and dry.   Neurological:      General: No focal deficit present.      Mental Status: She is alert. Mental status is at baseline.      Comments: Oriented only to self and month   Psychiatric:         Mood and Affect: Mood normal.         Behavior: Behavior normal.       ---------------------------------------------------------------------------------------------------------------------  EKG: Tachycardia, heart rate 104.  Awaiting formal cardiology read        I have personally reviewed the EKG/Telemetry strip  ---------------------------------------------------------------------------------------------------------------------   Results from last 7 days   Lab Units 02/22/24  1922 02/22/24  1518   HSTROP T ng/L 50* 53*     Results from last 7 days   Lab Units 02/22/24  1518   PROBNP pg/mL 22,342.0*       Results from last 7 days   Lab Units 02/22/24  1420   PH, ARTERIAL pH units 7.435   PO2 ART mm Hg 53.6*   PCO2, ARTERIAL mm Hg 31.5*   HCO3 ART mmol/L 21.1     Results from last 7 days   Lab Units 02/22/24  1619 02/22/24  1518   CRP mg/dL  --  8.72*   LACTATE mmol/L 1.2  --    WBC 10*3/mm3  --  13.08*   HEMOGLOBIN g/dL  --  13.6   HEMATOCRIT %  --  42.1   MCV fL  --  89.2   MCHC g/dL  --  32.3   PLATELETS 10*3/mm3  --  336     Results from last 7 days   Lab Units 02/22/24  1518   SODIUM mmol/L 138   POTASSIUM mmol/L 4.0   CHLORIDE mmol/L 103   CO2 mmol/L 22.6   BUN mg/dL 27*   CREATININE mg/dL 0.94   CALCIUM mg/dL 9.6   GLUCOSE mg/dL 91   ALBUMIN g/dL 3.8   BILIRUBIN mg/dL 0.3   ALK PHOS U/L 88   AST (SGOT) U/L 21   ALT (SGPT) U/L 18   Estimated Creatinine Clearance: 53.6 mL/min (by C-G formula based on SCr of 0.94  "mg/dL).  No results found for: \"AMMONIA\"    No results found for: \"HGBA1C\", \"POCGLU\"  No results found for: \"HGBA1C\"  Lab Results   Component Value Date    TSH 0.915 02/22/2024       No results found for: \"BLOODCX\"  No results found for: \"URINECX\"  No results found for: \"WOUNDCX\"  No results found for: \"STOOLCX\"  No results found for: \"RESPCX\"  No results found for: \"MRSACX\"  Pain Management Panel           No data to display              I have personally reviewed the above laboratory results.   ---------------------------------------------------------------------------------------------------------------------  Imaging Results (Last 7 Days)       Procedure Component Value Units Date/Time    MRI Hip Right Without Contrast [[id number]] Collected: 02/22/24 1904     Updated: 02/22/24 2003    Narrative:      Comparison: None     Fracture right intertrochanter which involves the right greater  trochanter. No dislocation is seen. Left hip is intact. Soft tissue  edema surrounding right hip. No significant joint effusion is seen. Tear  of the right gluteus medius muscle at the myotendinous junction.       Impression:      Impression:  1. Right intertrochanteric fracture.  2. Edema right hip with tear the right gluteus medius muscle.        This report was finalized on 2/22/2024 7:08 PM by Ric Bowman DO.       CT Abdomen Pelvis With Contrast [[id number]] Collected: 02/22/24 1651     Updated: 02/22/24 1655    Narrative:      PROCEDURE: CT ABDOMEN PELVIS W CONTRAST-     HISTORY:  sepsis of unknown etiology; S72.114A-Nondisplaced fracture of  greater trochanter of right femur, initial encounter for closed  fracture; J96.01-Acute respiratory failure with hypoxia     COMPARISON:  None .     TECHNIQUE: Multiple axial CT images were obtained from the lung bases  through the pubic symphysis following the administration of Isovue 300  contrast. This study was performed with techniques to keep radiation  doses as low as " reasonably achievable (ALARA). Individualized dose  reduction techniques using automated exposure control or adjustment of  mA and/or kV according to the patient size were employed.     FINDINGS:     ABDOMEN: The lung bases are clear. The heart is normal in size. The  liver is normal in attenuation with no focal lesions. The spleen is  homogenous. No adrenal mass is present. The pancreas is unremarkable.  The kidneys are normal. The aorta is normal in caliber. The mesenteric  vascualture is patent. There is no free fluid or adenopathy. There is a  moderate sized hiatal hernia. There is no evidence of a small bowel  obstruction.     PELVIS: The appendix is not identified. The patient is status post  hysterectomy. The urinary bladder is unremarkable. There is no  significant free fluid or adenopathy. Bone windows redemonstrate a  nondisplaced fracture of the right greater trochanter. There are diffuse  degenerative changes within the lumbar spine with grade 1  anterolisthesis of L4 on L5 the extraperitoneal soft tissues are  unremarkable.       Impression:      No acute intra-abdominal or pelvic process.        This report was finalized on 2/22/2024 4:53 PM by Kayla Canales M.D..       CT Angiogram Chest Pulmonary Embolism [[id number]] Collected: 02/22/24 1649     Updated: 02/22/24 1652    Narrative:      PROCEDURE: CT ANGIOGRAM CHEST PULMONARY EMBOLISM-     HISTORY: Pulmonary embolism (PE) suspected, positive D-dimer;  S72.114A-Nondisplaced fracture of greater trochanter of right femur,  initial encounter for closed fracture; J96.01-Acute respiratory failure  with hypoxia     COMPARISON: None .     TECHNIQUE: Multiple axial CT images were obtained from the thoracic  inlet through the upper abdomen following the administration of Isovue  300 per the CT PE protocol. Coronal and oblique 3D MIP images were  reconstructed from the original axial data set. This study was performed  with techniques to keep radiation  doses as low as reasonably achievable  (ALARA). Individualized dose reduction techniques using automated  exposure control or adjustment of mA and/or kV according to the patient  size were employed.     FINDINGS: There are no filling defects within the pulmonary arteries to  suggest an embolus. The thoracic aorta is normal in caliber the heart is  normal in size. There are no pleural or pericardial effusions. There is  no adenopathy. There is a moderate sized hiatal hernia. Lung windows  reveal fibrotic lung changes. The visualized upper abdomen is  unremarkable. Bone windows reveal no acute osseous abnormalities.       Impression:      1. No evidence of pulmonary embolus.  2. Fibrotic lung changes.              This report was finalized on 2/22/2024 4:50 PM by Kayla Canales M.D..       CT Lower Extremity Right Without Contrast [[id number]] Collected: 02/22/24 1534     Updated: 02/22/24 1537    Narrative:      PROCEDURE: CT LOWER EXTREMITY RIGHT WO CONTRAST-     HISTORY: right hip pain s/p fall     COMPARISON: None .     PROCEDURE: Axial images were obtained through the right hip by computed  tomography. Sagittal and coronal reconstruction images were performed.  This study was performed with techniques to keep radiation doses as low  as reasonably achievable (ALARA). Individualized dose reduction  techniques using automated exposure control or adjustment of mA and/or  kV according to the patient size were employed.         FINDINGS: There is a fracture of the right greater trochanter. No other  fractures or dislocations are evident. The joint spaces are preserved.  The soft tissues are unremarkable.       Impression:      Fracture of the right greater trochanter.              This report was finalized on 2/22/2024 3:35 PM by Kayla Canales M.D..       CT Pelvis Without Contrast [[id number]] Collected: 02/22/24 1533     Updated: 02/22/24 1536    Narrative:      PROCEDURE: CT PELVIS WO CONTRAST-     HISTORY:  pain s/p fall     COMPARISON: None.     PROCEDURE: Axial images were obtained through the pelvis using bone  window algorithms. Coronal and sagittal images were reformatted. This  study was performed with techniques to keep radiation doses as low as  reasonably achievable (ALARA). Individualized dose reduction techniques  using automated exposure control or adjustment of mA and/or kV according  to the patient size were employed.     FINDINGS:     PELVIS: There is a fracture of the right greater trochanter. No other  fractures or dislocations are evident. The pubic symphysis and SI joints  are intact. The soft tissues are unremarkable.       Impression:      Fracture of the right greater trochanter.              This report was finalized on 2/22/2024 3:34 PM by Kayla Canales M.D..       CT Head Without Contrast [[id number]] Collected: 02/22/24 1442     Updated: 02/22/24 1444    Narrative:      PROCEDURE: CT HEAD WO CONTRAST-     HISTORY: AMS; frequent falls     COMPARISON:  None .     TECHNIQUE: Multiple axial CT images were performed from the foramen  magnum to the vertex without enhancement. This study was performed with  techniques to keep radiation doses as low as reasonably achievable  (ALARA). Individualized dose reduction techniques using automated  exposure control or adjustment of mA and/or kV according to the patient  size were employed.     FINDINGS: There is generalized cerebral volume loss. Patchy  hypodensities in the periventricular white matter consistent with  chronic small vessel ischemic change. There is no CT evidence of  hemorrhage. There is no mass, mass effect or midline shift.  There is no  hydrocephalus. The paranasal sinuses are clear. Bone windows reveal no  acute osseous abnormalities.       Impression:      No acute intracranial process.              This report was finalized on 2/22/2024 2:42 PM by Kayla Canales M.D..       XR Hip With or Without Pelvis 2 - 3 View Right  [[id number]] Collected: 02/22/24 1440     Updated: 02/22/24 1444    Narrative:      PROCEDURE: XR HIP W OR WO PELVIS 2-3 VIEW RIGHT-     HISTORY: pain s/p fall     COMPARISON: None.     FINDINGS: A possible nondisplaced fracture of the right greater  trochanter. The joint spaces are preserved. The pubic symphysis and SI  joints are intact. The soft tissues are unremarkable.       Impression:      Possible nondisplaced fracture of the right greater  trochanter. CT of the right hip may prove useful in further evaluation              This report was finalized on 2/22/2024 2:41 PM by Kayla Canales M.D..       XR Chest 1 View [[id number]] Collected: 02/22/24 1439     Updated: 02/22/24 1442    Narrative:      PROCEDURE: XR CHEST 1 VW-       HISTORY: wheezing     COMPARISON: 12/23/2017.     FINDINGS: The heart is normal in size. The mediastinum is unremarkable.  There is a reticular lung pattern bilaterally which is new compared to  the prior exam. There are no effusions. There is no pneumothorax. There  are no acute osseous abnormalities.       Impression:      Fibrotic lung changes with no acute cardiopulmonary process.        This report was finalized on 2/22/2024 2:40 PM by Kayla Canales M.D..             I have personally reviewed the above radiology results.     Last Echocardiogram:    ---------------------------------------------------------------------------------------------------------------------    Assessment & Plan      Active Hospital Problems    Diagnosis  POA    **Intertrochanteric fracture of right femur [S72.141A]  Yes     Intertrochanteric fracture right femur, POA  Right gluteus media muscle tear, POA  Frequent falls, POA  Right intertrochanteric hip fracture seen on CT and MRI.  Patient orthopedic surgery consult, assistance appreciated  Pain management  N.p.o. at midnight  Frequent falls due to orthostasis given description/history per daughter.  Will hold any home antihypertensive agents  unless BP gets significantly elevated.  Can do orthostatic vitals after patient has had procedure and doing PT.  SIRS, POA  Patient met SIRS criteria with WBC greater than 12 and heart rate greater than 90.  Obvious source of infection at this time.  Continue monitor vital signs.  Suspect inflammatory post fracture/muscle tear  Suspected chronic CHF, POA  Family reports no known diagnosis of CHF.  Patient does not appear to be in any sort of exacerbation at this time, no signs of volume overload.  However BNP was significantly elevated.  Patient received a liter of fluids and 80 mg of Lasix in the ED.  No echo on file for review.  Will obtain TTE preoperatively in the a.m.  CHRONIC MEDICAL PROBLEMS    Type II DM  Hold any oral hypoglycemics to prevent hypoglycemia. Will review home diabetes medications once available per pharmacy.   Hypoglycemia protocol in place if necessary.   AccuCheks before meals and at bedtime.  Essential hypertension  BP appears well controlled.  Due to possible orthostasis, hold home antihypertensives unless BP becomes significantly/consistently elevated.  Hyperlipidemia   Restart home aspirin and statin pending med rec   Ongoing tobacco abuse   Strongly encourage cessation   NRT available     F/E/N: Oral hydration.  Continue monitor electrolytes.  N.p.o. at midnight.    ---------------------------------------------------  DVT Prophylaxis: SCDs  Activity: Bedrest    The patient is considered to be a high risk patient due to: Right hip fracture    INPATIENT status due to the need for care which can only be reasonably provided in an hospital setting such as aggressive/expedited ancillary services and/or consultation services, the necessity for IV medications, close physician monitoring and/or the possible need for procedures.  In such, I feel patient’s risk for adverse outcomes and need for care warrant INPATIENT evaluation and predict the patient’s care encounter to likely last beyond 2  midnights.      Code Status: Full code    Disposition/Discharge planning: Pending clinical course, will need rehab postop    I have discussed the patient's assessment and plan with Dr. Whitmore.      Peyton Mariscal PA-C  Hospitalist Service -- [facility]       02/22/24  22:24 EST    Attending Physician: Ivon Whitmore, DO

## 2024-02-23 NOTE — NURSING NOTE
Patient refused a bath this shift, because she has to get one tonight for surgery in the morning. Educated on bathing daily, patient still refused.

## 2024-02-23 NOTE — ANESTHESIA PREPROCEDURE EVALUATION
Anesthesia Evaluation     Patient summary reviewed and Nursing notes reviewed   no history of anesthetic complications:                Airway   Mallampati: III  TM distance: >3 FB  Neck ROM: full  No difficulty expected  Dental - normal exam     Pulmonary - negative pulmonary ROS and normal exam   Cardiovascular - negative cardio ROS and normal exam  Exercise tolerance: good (4-7 METS)    NYHA Classification: II  Patient on routine beta blocker and Beta blocker given within 24 hours of surgery    (+) hypertension, hyperlipidemia      Neuro/Psych  (+) dementia, poor historian.  GI/Hepatic/Renal/Endo - negative ROS   (+) diabetes mellitus    Musculoskeletal (-) negative ROS    Abdominal  - normal exam    Bowel sounds: normal.   Substance History - negative use     OB/GYN negative ob/gyn ROS         Other - negative ROS  arthritis,                   Anesthesia Plan    ASA 3     general     intravenous induction     Anesthetic plan, risks, benefits, and alternatives have been provided, discussed and informed consent has been obtained with: patient.      CODE STATUS:    Code Status (Patient has no pulse and is not breathing): CPR (Attempt to Resuscitate)  Medical Interventions (Patient has pulse or is breathing): Full Support

## 2024-02-23 NOTE — SIGNIFICANT NOTE
02/23/24 0817   OTHER   Discipline physical therapist   Rehab Time/Intention   Session Not Performed other (see comments)   Recommendation   PT - Next Appointment   (Pt has yet to receive orthopedic intervention/surgery, pending PT eval.)

## 2024-02-23 NOTE — CONSULTS
Inpatient Orthopedic Surgery Consult  Consult performed by: Indio Edmond APRN  Consult ordered by: Ivon Whitmore DO          Patient Identification:  Name:  Breanne Dickson  Age:  68 y.o.  Sex:  female  :  1955  MRN:  [id number]  Visit Number:  [id number]  Primary care provider:  Mery Coulter DO    History of presenting illness:   This is a 68-year-old female who presented to the emergency department secondary to right hip pain.  The patient's family was at bedside.  Patient has significant history of dementia, is unable to assist any significant history.  Patient's family notes that on Tuesday night she sustained a mechanical fall.  The patient has been ambulating on the right lower extremity, but was noting persistent right hip pain with ambulation.  Patient's family brought the patient to the emergency department for evaluation.  The patient on an MRI imaging was noted to have a right intertrochanteric fracture.  The patient at baseline is fairly mobile.  She has not had history of any known frequent falls.  The patient does not routinely utilize any walker or cane for assistive device.  The patient's family notes that they have continued to note decline in her cognition since  when she was initially diagnosed with dementia.  The patient is pleasantly confused on exam.  She is alert to place, and person.  She was not alert to the year.  But did know who the president was.  Patient takes aspirin for anticoagulation.  No other arthralgias noted this morning on exam.    Review of Systems:     Unable to accurately obtain due to patient's confusion.  ---------------------------------------------------------------------------------------------------------------------  ---------------------------------------------------------------------------------------------------------------------   Past History:  Family History   Problem Relation Age of Onset    Breast  cancer Neg Hx      Past Medical History:   Diagnosis Date    Arthritis     Diabetes mellitus     Hyperlipidemia     Hypertension     Multiple rib fractures 2000     History reviewed. No pertinent surgical history.  Social History     Socioeconomic History    Marital status: Single   Tobacco Use    Smoking status: Every Day     Packs/day: 0.25     Years: 40.00     Additional pack years: 0.00     Total pack years: 10.00     Types: Electronic Cigarette, Cigarettes   Vaping Use    Vaping Use: Never used   Substance and Sexual Activity    Alcohol use: No    Drug use: No    Sexual activity: Defer     ---------------------------------------------------------------------------------------------------------------------   Allergies:  Penicillins  ---------------------------------------------------------------------------------------------------------------------   Prior to Admission Medications       Prescriptions Last Dose Informant Patient Reported? Taking?    acetaminophen-codeine (TYLENOL #3) 300-30 MG per tablet   No No    Take 1 tablet by mouth Every 6 (Six) Hours As Needed for Moderate Pain .    aspirin 81 MG chewable tablet   Yes No    Chew 81 mg Daily.    cyclobenzaprine (FLEXERIL) 10 MG tablet   No No    Take 1 tablet by mouth 2 (Two) Times a Day As Needed for Muscle Spasms.    FLUoxetine (PROzac) 20 MG capsule   Yes No    Take 40 mg by mouth Daily.    indomethacin (INDOCIN) 50 MG capsule   No No    Take 1 capsule by mouth 3 (Three) Times a Day With Meals.    LISINOPRIL PO   Yes No    Take  by mouth.    metFORMIN (GLUCOPHAGE) 500 MG tablet   Yes No    Take 500 mg by mouth 2 (Two) Times a Day With Meals.    methylPREDNISolone (MEDROL, JORGE,) 4 MG tablet   No No    Take as directed on package instructions.    SIMVASTATIN PO   Yes No    Take  by mouth.          Hospital Meds:  nicotine, 1 patch, Transdermal, Q24H  sodium chloride, 10 mL, Intravenous, Q12H          ---------------------------------------------------------------------------------------------------------------------   Vital Signs:  Temp:  [97.6 °F (36.4 °C)-98.7 °F (37.1 °C)] 97.6 °F (36.4 °C)  Heart Rate:  [] 95  Resp:  [17-18] 18  BP: (105-140)/(72-94) 113/75      02/22/24  1315 02/22/24  2154   Weight: 59.4 kg (131 lb) 58.5 kg (129 lb)     Body mass index is 26.05 kg/m².  ---------------------------------------------------------------------------------------------------------------------   Physical exam:  Constitutional:  Well developed, well nourished.  No respiratory distress.      HENT:  Head: Normocephalic and atraumatic.  Neck:  Neck supple.  Cardiovascular:  Normal rate  Pulmonary/Chest:  No respiratory distress  Abdominal:  Soft, no tenderness.   Musculoskeletal: Upon examination of the right hip the patient has no open wounds noted.  No significant soft tissue swelling is noted.  The right thigh is soft.  Good neurovascular status right lower extremity noted.  Dorsiflexion plantarflexion intact right ankle.  No foot drop abnormality is noted.  The patient has positive pedal pulses noted to the right lower extremity.  No cyanosis is noted.  There is no cellulitis or erythema of the right hip noted.  Neurological:  Alert and oriented to person and place but not to time.  Skin:  Skin is warm and dry  Psychiatric:  Normal mood and affect  Peripheral vascular:  No edema   ---------------------------------------------------------------------------------------------------------------------   .  ---------------------------------------------------------------------------------------------------------------------   Results from last 7 days   Lab Units 02/23/24  0201 02/22/24  1619 02/22/24  1518   CRP mg/dL  --   --  8.72*   LACTATE mmol/L  --  1.2  --    WBC 10*3/mm3 10.55  --  13.08*   HEMOGLOBIN g/dL 13.5  --  13.6   HEMATOCRIT % 41.5  --  42.1   MCV fL 88.5  --  89.2   MCHC g/dL 32.5  --  32.3  "  PLATELETS 10*3/mm3 341  --  336   INR  1.03  --   --      Results from last 7 days   Lab Units 02/22/24  1420   PH, ARTERIAL pH units 7.435   PO2 ART mm Hg 53.6*   PCO2, ARTERIAL mm Hg 31.5*   HCO3 ART mmol/L 21.1     Results from last 7 days   Lab Units 02/23/24  0201 02/22/24  1518   SODIUM mmol/L 140 138   POTASSIUM mmol/L 3.8 4.0   CHLORIDE mmol/L 101 103   CO2 mmol/L 25.4 22.6   BUN mg/dL 26* 27*   CREATININE mg/dL 0.96 0.94   CALCIUM mg/dL 9.6 9.6   GLUCOSE mg/dL 155* 91   ALBUMIN g/dL 3.8 3.8   BILIRUBIN mg/dL 0.4 0.3   ALK PHOS U/L 84 88   AST (SGOT) U/L 18 21   ALT (SGPT) U/L 16 18   Estimated Creatinine Clearance: 52.5 mL/min (by C-G formula based on SCr of 0.96 mg/dL).  No results found for: \"AMMONIA\"  Results from last 7 days   Lab Units 02/22/24  1922 02/22/24  1518   HSTROP T ng/L 50* 53*     Results from last 7 days   Lab Units 02/22/24  1518   PROBNP pg/mL 22,342.0*     No results found for: \"HGBA1C\"  Lab Results   Component Value Date    TSH 0.915 02/22/2024     No results found for: \"PREGTESTUR\", \"PREGSERUM\", \"HCG\", \"HCGQUANT\"  Pain Management Panel           No data to display              No results found for: \"BLOODCX\"  No results found for: \"URINECX\"  No results found for: \"WOUNDCX\"  No results found for: \"STOOLCX\"      ---------------------------------------------------------------------------------------------------------------------   Imaging Results (Last 7 Days)       Procedure Component Value Units Date/Time    MRI Hip Right Without Contrast [[id number]] Collected: 02/22/24 1904     Updated: 02/22/24 2003    Narrative:      Comparison: None     Fracture right intertrochanter which involves the right greater  trochanter. No dislocation is seen. Left hip is intact. Soft tissue  edema surrounding right hip. No significant joint effusion is seen. Tear  of the right gluteus medius muscle at the myotendinous junction.       Impression:      Impression:  1. Right intertrochanteric fracture.  2. " Edema right hip with tear the right gluteus medius muscle.        This report was finalized on 2/22/2024 7:08 PM by Ric Bowman DO.       CT Abdomen Pelvis With Contrast [[id number]] Collected: 02/22/24 1651     Updated: 02/22/24 1655    Narrative:      PROCEDURE: CT ABDOMEN PELVIS W CONTRAST-     HISTORY:  sepsis of unknown etiology; S72.114A-Nondisplaced fracture of  greater trochanter of right femur, initial encounter for closed  fracture; J96.01-Acute respiratory failure with hypoxia     COMPARISON:  None .     TECHNIQUE: Multiple axial CT images were obtained from the lung bases  through the pubic symphysis following the administration of Isovue 300  contrast. This study was performed with techniques to keep radiation  doses as low as reasonably achievable (ALARA). Individualized dose  reduction techniques using automated exposure control or adjustment of  mA and/or kV according to the patient size were employed.     FINDINGS:     ABDOMEN: The lung bases are clear. The heart is normal in size. The  liver is normal in attenuation with no focal lesions. The spleen is  homogenous. No adrenal mass is present. The pancreas is unremarkable.  The kidneys are normal. The aorta is normal in caliber. The mesenteric  vascualture is patent. There is no free fluid or adenopathy. There is a  moderate sized hiatal hernia. There is no evidence of a small bowel  obstruction.     PELVIS: The appendix is not identified. The patient is status post  hysterectomy. The urinary bladder is unremarkable. There is no  significant free fluid or adenopathy. Bone windows redemonstrate a  nondisplaced fracture of the right greater trochanter. There are diffuse  degenerative changes within the lumbar spine with grade 1  anterolisthesis of L4 on L5 the extraperitoneal soft tissues are  unremarkable.       Impression:      No acute intra-abdominal or pelvic process.        This report was finalized on 2/22/2024 4:53 PM by Kayla  LIDIA Canales.       CT Angiogram Chest Pulmonary Embolism [[id number]] Collected: 02/22/24 1649     Updated: 02/22/24 1652    Narrative:      PROCEDURE: CT ANGIOGRAM CHEST PULMONARY EMBOLISM-     HISTORY: Pulmonary embolism (PE) suspected, positive D-dimer;  S72.114A-Nondisplaced fracture of greater trochanter of right femur,  initial encounter for closed fracture; J96.01-Acute respiratory failure  with hypoxia     COMPARISON: None .     TECHNIQUE: Multiple axial CT images were obtained from the thoracic  inlet through the upper abdomen following the administration of Isovue  300 per the CT PE protocol. Coronal and oblique 3D MIP images were  reconstructed from the original axial data set. This study was performed  with techniques to keep radiation doses as low as reasonably achievable  (ALARA). Individualized dose reduction techniques using automated  exposure control or adjustment of mA and/or kV according to the patient  size were employed.     FINDINGS: There are no filling defects within the pulmonary arteries to  suggest an embolus. The thoracic aorta is normal in caliber the heart is  normal in size. There are no pleural or pericardial effusions. There is  no adenopathy. There is a moderate sized hiatal hernia. Lung windows  reveal fibrotic lung changes. The visualized upper abdomen is  unremarkable. Bone windows reveal no acute osseous abnormalities.       Impression:      1. No evidence of pulmonary embolus.  2. Fibrotic lung changes.              This report was finalized on 2/22/2024 4:50 PM by Kayla Canales M.D..       CT Lower Extremity Right Without Contrast [[id number]] Collected: 02/22/24 1534     Updated: 02/22/24 1537    Narrative:      PROCEDURE: CT LOWER EXTREMITY RIGHT WO CONTRAST-     HISTORY: right hip pain s/p fall     COMPARISON: None .     PROCEDURE: Axial images were obtained through the right hip by computed  tomography. Sagittal and coronal reconstruction images were  performed.  This study was performed with techniques to keep radiation doses as low  as reasonably achievable (ALARA). Individualized dose reduction  techniques using automated exposure control or adjustment of mA and/or  kV according to the patient size were employed.         FINDINGS: There is a fracture of the right greater trochanter. No other  fractures or dislocations are evident. The joint spaces are preserved.  The soft tissues are unremarkable.       Impression:      Fracture of the right greater trochanter.              This report was finalized on 2/22/2024 3:35 PM by Kayla Canales M.D..       CT Pelvis Without Contrast [[id number]] Collected: 02/22/24 1533     Updated: 02/22/24 1536    Narrative:      PROCEDURE: CT PELVIS WO CONTRAST-     HISTORY: pain s/p fall     COMPARISON: None.     PROCEDURE: Axial images were obtained through the pelvis using bone  window algorithms. Coronal and sagittal images were reformatted. This  study was performed with techniques to keep radiation doses as low as  reasonably achievable (ALARA). Individualized dose reduction techniques  using automated exposure control or adjustment of mA and/or kV according  to the patient size were employed.     FINDINGS:     PELVIS: There is a fracture of the right greater trochanter. No other  fractures or dislocations are evident. The pubic symphysis and SI joints  are intact. The soft tissues are unremarkable.       Impression:      Fracture of the right greater trochanter.              This report was finalized on 2/22/2024 3:34 PM by Kayla Canales M.D..       CT Head Without Contrast [[id number]] Collected: 02/22/24 1442     Updated: 02/22/24 1444    Narrative:      PROCEDURE: CT HEAD WO CONTRAST-     HISTORY: AMS; frequent falls     COMPARISON:  None .     TECHNIQUE: Multiple axial CT images were performed from the foramen  magnum to the vertex without enhancement. This study was performed with  techniques to keep radiation  doses as low as reasonably achievable  (ALARA). Individualized dose reduction techniques using automated  exposure control or adjustment of mA and/or kV according to the patient  size were employed.     FINDINGS: There is generalized cerebral volume loss. Patchy  hypodensities in the periventricular white matter consistent with  chronic small vessel ischemic change. There is no CT evidence of  hemorrhage. There is no mass, mass effect or midline shift.  There is no  hydrocephalus. The paranasal sinuses are clear. Bone windows reveal no  acute osseous abnormalities.       Impression:      No acute intracranial process.              This report was finalized on 2/22/2024 2:42 PM by Kayla Canales M.D..       XR Hip With or Without Pelvis 2 - 3 View Right [[id number]] Collected: 02/22/24 1440     Updated: 02/22/24 1444    Narrative:      PROCEDURE: XR HIP W OR WO PELVIS 2-3 VIEW RIGHT-     HISTORY: pain s/p fall     COMPARISON: None.     FINDINGS: A possible nondisplaced fracture of the right greater  trochanter. The joint spaces are preserved. The pubic symphysis and SI  joints are intact. The soft tissues are unremarkable.       Impression:      Possible nondisplaced fracture of the right greater  trochanter. CT of the right hip may prove useful in further evaluation              This report was finalized on 2/22/2024 2:41 PM by Kayla Canales M.D..       XR Chest 1 View [[id number]] Collected: 02/22/24 1439     Updated: 02/22/24 1442    Narrative:      PROCEDURE: XR CHEST 1 VW-       HISTORY: wheezing     COMPARISON: 12/23/2017.     FINDINGS: The heart is normal in size. The mediastinum is unremarkable.  There is a reticular lung pattern bilaterally which is new compared to  the prior exam. There are no effusions. There is no pneumothorax. There  are no acute osseous abnormalities.       Impression:      Fibrotic lung changes with no acute cardiopulmonary process.        This report was finalized on  2/22/2024 2:40 PM by Kayla Canales M.D..             ----------------------------------------------------------------------------------------------------------------------  Assessment:   #1 right hip intertrochanteric fracture          Plan:     Dr. Burton reviewed the patient's imaging.  On MRI imaging, patient has findings consistent with right hip intertrochanteric fracture.  Dr. Burton recommends surgical intervention.  The patient be scheduled for right hip open reduction internal fixation with intramedullary nail.    Surgical intervention will be scheduled for 2/24/2024    Patient will be n.p.o. after midnight    PT/INR obtained preoperatively    Will obtain 2 views of the right femur to rule out more distal fracture morphology.    Type and screen has been obtained    Dr. Burton will discuss the risks and benefits associated with surgical intervention with the patient's family    Hospitalist for medical management    Orthopedic surgery will continue to follow      Thank you for the consult.    Indio Edmond, APRN  02/23/24  09:43 EST

## 2024-02-23 NOTE — CASE MANAGEMENT/SOCIAL WORK
Discharge Planning Assessment  [facility]     Patient Name: Breanne Dickson  MRN: [id number]  Today's Date: 2/23/2024    Admit Date: 2/22/2024     Discharge Needs Assessment       Row Name 02/23/24 1144       Living Environment    People in Home alone    Current Living Arrangements home    Primary Care Provided by self;child(maxx)    Provides Primary Care For no one    Family Caregiver if Needed child(maxx), adult    Family Caregiver Names Dtrs Maykel and Mary    Quality of Family Relationships helpful;involved;supportive    Living Arrangement Comments Pt lives at home alone in cabin on dtr Mary's property. Pt and family requests IPR at [place], then plans for Pt to move to her dtr Maykel's home at [address] [place] [address] in [facility]       Transition Planning    Patient/Family Anticipates Transition to inpatient rehabilitation facility    Transportation Anticipated family or friend will provide       Discharge Needs Assessment    Equipment Currently Used at Home none    Concerns to be Addressed discharge planning    Equipment Needed After Discharge walker, rolling;commode    Discharge Facility/Level of Care Needs rehabilitation facility                   Discharge Plan       Row Name 02/23/24 1146       Plan    Plan Pt was admitted on 02/22/24. SS received CM consult for hip fracture, dc planning. SS spoke with Pt and Pt's dtr Maykel at bedside on this date. Pt lives at home alone on dtr [place] property but Pt and family have requested inpatient rehab placement at discharge. Pt does not utilize home health or [facility] [place]. Pt would benefit from a rolling walker and bedside commode at discharge. Pt's PCP is Mery Coulter. Pt does not have a POA/living will. Pt to have surgery tomorrow 02/24/24. Pt requests inpatient rehab placement at discharge. Pt's dtr states she plans for Pt to come to her home once IPR is complete at [address] [place] [id number] in [facility]. SS to follow.    Roadmap to  Recovery Yes                    Ijeoma Oswald, BSW

## 2024-02-23 NOTE — PROGRESS NOTES
[facility] HOSPITALIST PROGRESS NOTE     Patient Identification:  Name:  Breanne Dickson  Age:  68 y.o.  Sex:  female  :  1955  MRN:  [id number]  Visit Number:  [id number]  ROOM: [address] [place] [address]     Primary Care Provider:  Mery Coulter DO    Length of stay in inpatient status:  1    Subjective     Chief Compliant:    Chief Complaint   Patient presents with    Altered Mental Status       History of Presenting Illness:    Patient seen in follow-up for right hip fracture and gluteus muscle tear.  This morning she is awake, at mental baseline given dementia.  Family present at bedside.  Denies any pain.  Having issues urinating.  Afebrile overnight.  96% on 1 to 2 L nasal cannula.  No adverse events noted overnight.    Objective     Current Hospital Meds:nicotine, 1 patch, Transdermal, Q24H  sodium chloride, 10 mL, Intravenous, Q12H         Current Antimicrobial Therapy:  Anti-Infectives (From admission, onward)      Ordered     Dose/Rate Route Frequency Start Stop    24 1610  vancomycin 1250 mg/250 mL 0.9% NS IVPB ([facility])        Ordering Provider: Gabriela Savage PA-C    20 mg/kg × 59.4 kg  over 75 Minutes Intravenous Once 24 1700 24 1610  cefepime 2000 mg IVPB in 100 mL NS ([facility])        Ordering Provider: Gabriela Savage PA-C    2,000 mg  over 30 Minutes Intravenous Once 24 1626 24 1731          Current Diuretic Therapy:  Diuretics (From admission, onward)      Ordered     Dose/Rate Route Frequency Start Stop    24 1635  furosemide (LASIX) injection 80 mg        Ordering Provider: Gabriela Savage PA-C    80 mg Intravenous Once 24 1651 24 1705          ----------------------------------------------------------------------------------------------------------------------  Vital Signs:  Temp:  [97.6 °F (36.4 °C)-98.7 °F (37.1 °C)] 97.6 °F (36.4 °C)  Heart Rate:  [] 95  Resp:  [17-18] 18  BP: (105-140)/(72-94)  113/75  SpO2:  [93 %-97 %] 96 %  on  Flow (L/min):  [1] 1;   Device (Oxygen Therapy): nasal cannula  Body mass index is 26.05 kg/m².    Wt Readings from Last 3 Encounters:   02/22/24 58.5 kg (129 lb)   05/27/19 75.8 kg (167 lb)   12/23/17 79.4 kg (175 lb)     Intake & Output (last 3 days)         02/20 0701 02/21 0700 02/21 0701 02/22 0700 02/22 0701 02/23 0700 02/23 0701 02/24 0700    P.O.   0     IV Piggyback   750     Total Intake(mL/kg)   750 (12.8)     Net   +750             Urine Unmeasured Occurrence   1 x           NPO Diet NPO Type: Strict NPO  Diet: Cardiac Diets, Diabetic Diets; Healthy Heart (2-3 Na+); Consistent Carbohydrate; Texture: Regular Texture (IDDSI 7); Fluid Consistency: Thin (IDDSI 0)  ----------------------------------------------------------------------------------------------------------------------  Physical exam:  Constitutional: Elderly female, Well-developed and well-nourished, resting comfortably in bed, no acute distress.      HENT:  Head:  Normocephalic and atraumatic.  Mouth:  Moist mucous membranes.    Eyes:  Conjunctivae and EOM are normal. No scleral icterus.   Cardiovascular:  Normal rate, regular rhythm and normal heart sounds with no murmur. No JVD.   Pulmonary/Chest:  No respiratory distress, no wheezes, no crackles, with normal breath sounds and good air movement. Unlabored. No accessory muscle use.  Abdominal:  Soft. No distension and no tenderness.  Bowel sounds present. No rebound or guarding.   Musculoskeletal:  No tenderness, and no deformity.  No red or swollen joints anywhere.    Neurological:  Alert and oriented to person, place but not time.  No cranial nerve deficit.   Nonfocal.   Skin:  Skin is warm and dry. No rash noted. No pallor.   Peripheral vascular:  No clubbing, no cyanosis, no edema. Pedal and tibial pulses 2 out of 4 bilaterally.  Neurovascularly  "intact.    ----------------------------------------------------------------------------------------------------------------------  Results from last 7 days   Lab Units 02/23/24  0201 02/22/24  1619 02/22/24  1518   CRP mg/dL  --   --  8.72*   LACTATE mmol/L  --  1.2  --    WBC 10*3/mm3 10.55  --  13.08*   HEMOGLOBIN g/dL 13.5  --  13.6   HEMATOCRIT % 41.5  --  42.1   MCV fL 88.5  --  89.2   MCHC g/dL 32.5  --  32.3   PLATELETS 10*3/mm3 341  --  336   INR  1.03  --   --      Results from last 7 days   Lab Units 02/22/24  1420   PH, ARTERIAL pH units 7.435   PO2 ART mm Hg 53.6*   PCO2, ARTERIAL mm Hg 31.5*   HCO3 ART mmol/L 21.1     Results from last 7 days   Lab Units 02/23/24  0201 02/22/24  1518   SODIUM mmol/L 140 138   POTASSIUM mmol/L 3.8 4.0   CHLORIDE mmol/L 101 103   CO2 mmol/L 25.4 22.6   BUN mg/dL 26* 27*   CREATININE mg/dL 0.96 0.94   CALCIUM mg/dL 9.6 9.6   GLUCOSE mg/dL 155* 91   ALBUMIN g/dL 3.8 3.8   BILIRUBIN mg/dL 0.4 0.3   ALK PHOS U/L 84 88   AST (SGOT) U/L 18 21   ALT (SGPT) U/L 16 18   Estimated Creatinine Clearance: 52.5 mL/min (by C-G formula based on SCr of 0.96 mg/dL).  No results found for: \"AMMONIA\"  Results from last 7 days   Lab Units 02/22/24  1922 02/22/24  1518   HSTROP T ng/L 50* 53*     Results from last 7 days   Lab Units 02/22/24  1518   PROBNP pg/mL 22,342.0*         Glucose   Date/Time Value Ref Range Status   02/23/2024 1119 221 (H) 70 - 130 mg/dL Final   02/23/2024 0627 98 70 - 130 mg/dL Final   02/22/2024 2232 134 (H) 70 - 130 mg/dL Final     Lab Results   Component Value Date    TSH 0.915 02/22/2024     No results found for: \"PREGTESTUR\", \"PREGSERUM\", \"HCG\", \"HCGQUANT\"  Pain Management Panel           No data to display              Brief Urine Lab Results  (Last result in the past 365 days)        Color   Clarity   Blood   Leuk Est   Nitrite   Protein   CREAT   Urine HCG        02/22/24 1708 Yellow   Clear   Negative   Negative   Negative   Negative                 No " "results found for: \"BLOODCX\"  No results found for: \"URINECX\"  No results found for: \"WOUNDCX\"  No results found for: \"STOOLCX\"  No results found for: \"RESPCX\"  No results found for: \"AFBCX\"  Results from last 7 days   Lab Units 02/22/24  1619 02/22/24  1518   PROCALCITONIN ng/mL  --  0.14   LACTATE mmol/L 1.2  --    CRP mg/dL  --  8.72*       I have personally looked at the labs and they are summarized above.  ----------------------------------------------------------------------------------------------------------------------  Detailed radiology reports for the last 24 hours:  Imaging Results (Last 24 Hours)       Procedure Component Value Units Date/Time    XR Femur 2 View Right [[id number]] Collected: 02/23/24 1038     Updated: 02/23/24 1043    Narrative:      PROCEDURE: XR FEMUR 2 VW RIGHT-     HISTORY: known intertrochanteric fracture, eval distal;  S72.144A-Nondisplaced intertrochanteric fracture of right femur, initial  encounter for closed fracture; S72.114A-Nondisplaced fracture of greater  trochanter of right femur, initial encounter for closed fracture;  J96.21-Acute and chronic respiratory failure with hypoxia; E87.70-Fluid  overload, unspecified; J44.1-Chronic obstructive pulmonary disease w     COMPARISON:  2/22/2024.     FINDINGS:  A 2 view exam demonstrates a fracture of the right greater  trochanter unchanged in alignment compared to the prior exam.  The joint  spaces are preserved.  No soft tissue abnormality is seen.       Impression:      No change in alignment of the patient's right greater  trochanter fracture.                 This report was finalized on 2/23/2024 10:41 AM by Kayla Canales M.D..       MRI Hip Right Without Contrast [[id number]] Collected: 02/22/24 1904     Updated: 02/22/24 2003    Narrative:      Comparison: None     Fracture right intertrochanter which involves the right greater  trochanter. No dislocation is seen. Left hip is intact. Soft tissue  edema surrounding " right hip. No significant joint effusion is seen. Tear  of the right gluteus medius muscle at the myotendinous junction.       Impression:      Impression:  1. Right intertrochanteric fracture.  2. Edema right hip with tear the right gluteus medius muscle.        This report was finalized on 2/22/2024 7:08 PM by Ric Bowman DO.       CT Abdomen Pelvis With Contrast [[id number]] Collected: 02/22/24 1651     Updated: 02/22/24 1655    Narrative:      PROCEDURE: CT ABDOMEN PELVIS W CONTRAST-     HISTORY:  sepsis of unknown etiology; S72.114A-Nondisplaced fracture of  greater trochanter of right femur, initial encounter for closed  fracture; J96.01-Acute respiratory failure with hypoxia     COMPARISON:  None .     TECHNIQUE: Multiple axial CT images were obtained from the lung bases  through the pubic symphysis following the administration of Isovue 300  contrast. This study was performed with techniques to keep radiation  doses as low as reasonably achievable (ALARA). Individualized dose  reduction techniques using automated exposure control or adjustment of  mA and/or kV according to the patient size were employed.     FINDINGS:     ABDOMEN: The lung bases are clear. The heart is normal in size. The  liver is normal in attenuation with no focal lesions. The spleen is  homogenous. No adrenal mass is present. The pancreas is unremarkable.  The kidneys are normal. The aorta is normal in caliber. The mesenteric  vascualture is patent. There is no free fluid or adenopathy. There is a  moderate sized hiatal hernia. There is no evidence of a small bowel  obstruction.     PELVIS: The appendix is not identified. The patient is status post  hysterectomy. The urinary bladder is unremarkable. There is no  significant free fluid or adenopathy. Bone windows redemonstrate a  nondisplaced fracture of the right greater trochanter. There are diffuse  degenerative changes within the lumbar spine with grade 1  anterolisthesis of L4 on  L5 the extraperitoneal soft tissues are  unremarkable.       Impression:      No acute intra-abdominal or pelvic process.        This report was finalized on 2/22/2024 4:53 PM by Kayla Canales M.D..       CT Angiogram Chest Pulmonary Embolism [[id number]] Collected: 02/22/24 1649     Updated: 02/22/24 1652    Narrative:      PROCEDURE: CT ANGIOGRAM CHEST PULMONARY EMBOLISM-     HISTORY: Pulmonary embolism (PE) suspected, positive D-dimer;  S72.114A-Nondisplaced fracture of greater trochanter of right femur,  initial encounter for closed fracture; J96.01-Acute respiratory failure  with hypoxia     COMPARISON: None .     TECHNIQUE: Multiple axial CT images were obtained from the thoracic  inlet through the upper abdomen following the administration of Isovue  300 per the CT PE protocol. Coronal and oblique 3D MIP images were  reconstructed from the original axial data set. This study was performed  with techniques to keep radiation doses as low as reasonably achievable  (ALARA). Individualized dose reduction techniques using automated  exposure control or adjustment of mA and/or kV according to the patient  size were employed.     FINDINGS: There are no filling defects within the pulmonary arteries to  suggest an embolus. The thoracic aorta is normal in caliber the heart is  normal in size. There are no pleural or pericardial effusions. There is  no adenopathy. There is a moderate sized hiatal hernia. Lung windows  reveal fibrotic lung changes. The visualized upper abdomen is  unremarkable. Bone windows reveal no acute osseous abnormalities.       Impression:      1. No evidence of pulmonary embolus.  2. Fibrotic lung changes.              This report was finalized on 2/22/2024 4:50 PM by Kayla Canales M.D..       CT Lower Extremity Right Without Contrast [[id number]] Collected: 02/22/24 1534     Updated: 02/22/24 1537    Narrative:      PROCEDURE: CT LOWER EXTREMITY RIGHT WO CONTRAST-     HISTORY: right hip  pain s/p fall     COMPARISON: None .     PROCEDURE: Axial images were obtained through the right hip by computed  tomography. Sagittal and coronal reconstruction images were performed.  This study was performed with techniques to keep radiation doses as low  as reasonably achievable (ALARA). Individualized dose reduction  techniques using automated exposure control or adjustment of mA and/or  kV according to the patient size were employed.         FINDINGS: There is a fracture of the right greater trochanter. No other  fractures or dislocations are evident. The joint spaces are preserved.  The soft tissues are unremarkable.       Impression:      Fracture of the right greater trochanter.              This report was finalized on 2/22/2024 3:35 PM by Kayla Canales M.D..       CT Pelvis Without Contrast [[id number]] Collected: 02/22/24 1533     Updated: 02/22/24 1536    Narrative:      PROCEDURE: CT PELVIS WO CONTRAST-     HISTORY: pain s/p fall     COMPARISON: None.     PROCEDURE: Axial images were obtained through the pelvis using bone  window algorithms. Coronal and sagittal images were reformatted. This  study was performed with techniques to keep radiation doses as low as  reasonably achievable (ALARA). Individualized dose reduction techniques  using automated exposure control or adjustment of mA and/or kV according  to the patient size were employed.     FINDINGS:     PELVIS: There is a fracture of the right greater trochanter. No other  fractures or dislocations are evident. The pubic symphysis and SI joints  are intact. The soft tissues are unremarkable.       Impression:      Fracture of the right greater trochanter.              This report was finalized on 2/22/2024 3:34 PM by Kayla Canales M.D..       CT Head Without Contrast [[id number]] Collected: 02/22/24 1442     Updated: 02/22/24 1444    Narrative:      PROCEDURE: CT HEAD WO CONTRAST-     HISTORY: AMS; frequent falls     COMPARISON:  None  .     TECHNIQUE: Multiple axial CT images were performed from the foramen  magnum to the vertex without enhancement. This study was performed with  techniques to keep radiation doses as low as reasonably achievable  (ALARA). Individualized dose reduction techniques using automated  exposure control or adjustment of mA and/or kV according to the patient  size were employed.     FINDINGS: There is generalized cerebral volume loss. Patchy  hypodensities in the periventricular white matter consistent with  chronic small vessel ischemic change. There is no CT evidence of  hemorrhage. There is no mass, mass effect or midline shift.  There is no  hydrocephalus. The paranasal sinuses are clear. Bone windows reveal no  acute osseous abnormalities.       Impression:      No acute intracranial process.              This report was finalized on 2/22/2024 2:42 PM by Kayla Canales M.D..       XR Hip With or Without Pelvis 2 - 3 View Right [[id number]] Collected: 02/22/24 1440     Updated: 02/22/24 1444    Narrative:      PROCEDURE: XR HIP W OR WO PELVIS 2-3 VIEW RIGHT-     HISTORY: pain s/p fall     COMPARISON: None.     FINDINGS: A possible nondisplaced fracture of the right greater  trochanter. The joint spaces are preserved. The pubic symphysis and SI  joints are intact. The soft tissues are unremarkable.       Impression:      Possible nondisplaced fracture of the right greater  trochanter. CT of the right hip may prove useful in further evaluation              This report was finalized on 2/22/2024 2:41 PM by Kayla Canales M.D..       XR Chest 1 View [[id number]] Collected: 02/22/24 1439     Updated: 02/22/24 1442    Narrative:      PROCEDURE: XR CHEST 1 VW-       HISTORY: wheezing     COMPARISON: 12/23/2017.     FINDINGS: The heart is normal in size. The mediastinum is unremarkable.  There is a reticular lung pattern bilaterally which is new compared to  the prior exam. There are no effusions. There is no  pneumothorax. There  are no acute osseous abnormalities.       Impression:      Fibrotic lung changes with no acute cardiopulmonary process.        This report was finalized on 2/22/2024 2:40 PM by Kayla Canales M.D..             Assessment & Plan      Patient is a 68-year-old female with history significant for type 2 diabetes mellitus, hypertension hyperlipidemia who presented to the ER with complaints of right hip pain and intermittent issues ambulating per family at bedside.    #Acute intertrochanteric fracture of the right femur  #Status post mechanical fall  #Right gluteus media muscle tear  --Patient presented to the ER with initial complaints of right hip pain and having difficulty ambulating, getting up from the toilet after several recent falls.  Although initial CT in the ER noted right hip fracture, there was questions whether this was operable or nonsurgical management.  I witnessed patient ambulating throughout the hallway from her room to the bathroom unassisted and was doing quite well prior to MRI results.  --Etiology of frequent falls at home is likely due to hypoxia which is undoubtably chronic given patient's significant structural lung disease  --MRI noted right intertrochanteric hip fracture and gluteus media muscle tear  --Neurovascularly intact  --As needed pain meds, antiemetics if needed  --N.p.o. at midnight for or tomorrow  --PT/OT postop, family requested IPR, consult placed  --Ortho consulted, appreciate recommendations    #Chronic hypoxic respiratory failure due to fibrosis  #COPD, not in acute exacerbation  --Patient was not on O2 at baseline prior to arrival however given SpO2 in the low 90s, ABG noted a PaO2 of 53.  Given the structural parenchymal changes on imaging with fibrosis, highly suspect this is chronic and contributing to the above fracture  --As needed nebulizers, Symbicort  --Plan to follow-up outpatient in the COPD clinic    #NSTEMI, likely type II  --Denies any  chest pain/pressure or tightness, hypoxia likely the culprit  --Initial troponin 53, repeat 50 with a delta of -3  --proBNP 22 K  --Echocardiogram ordered  --Start aspirin, high intensity statin, beta-blocker    #Type 2 diabetes mellitus, hold metformin, add SSI if needed  #Essential hypertension  #Hyperlipidemia, high intensity statin  #Current smoker, nicotine patch  #Dementia without behavioral disturbances, start Zyprexa at bedtime  #Reactive leukocytosis    CHECKLIST:  Abx: None  VTE: Lovenox  GI ppx: None  Diet: Consistent carb  Code: CPR, full  Dispo: Patient is high risk due to right hip fracture requiring orthopedic intervention and repair.  Family has requested inpatient rehab ultimate disposition plans to return home with family.    Dashawn Alonso,   [facility] Hospitalist  02/23/24  12:57 EST

## 2024-02-23 NOTE — PLAN OF CARE
Goal Outcome Evaluation:              Outcome Evaluation: Patient arrived to floor from ER this shift, 1L NC in place, NPO at midnight, PRN medications given for complaints of pain, no other complaints or request at this time, VSS, Will continue plan of care.

## 2024-02-24 ENCOUNTER — ANESTHESIA (OUTPATIENT)
Dept: PERIOP | Facility: HOSPITAL | Age: 69
End: 2024-02-24
Payer: MEDICARE

## 2024-02-24 ENCOUNTER — APPOINTMENT (OUTPATIENT)
Dept: GENERAL RADIOLOGY | Facility: HOSPITAL | Age: 69
End: 2024-02-24
Payer: MEDICARE

## 2024-02-24 LAB
ANION GAP SERPL CALCULATED.3IONS-SCNC: 11.2 MMOL/L (ref 5–15)
BASOPHILS # BLD AUTO: 0.06 10*3/MM3 (ref 0–0.2)
BASOPHILS NFR BLD AUTO: 0.5 % (ref 0–1.5)
BH CV ECHO MEAS - AO MAX PG: 6.9 MMHG
BH CV ECHO MEAS - AO MEAN PG: 4 MMHG
BH CV ECHO MEAS - AO ROOT DIAM: 2.9 CM
BH CV ECHO MEAS - AO V2 MAX: 131 CM/SEC
BH CV ECHO MEAS - AO V2 VTI: 23.2 CM
BH CV ECHO MEAS - EDV(CUBED): 50.7 ML
BH CV ECHO MEAS - EDV(MOD-SP4): 54.6 ML
BH CV ECHO MEAS - EF(MOD-SP4): 56.6 %
BH CV ECHO MEAS - ESV(CUBED): 68.9 ML
BH CV ECHO MEAS - ESV(MOD-SP4): 23.7 ML
BH CV ECHO MEAS - FS: -10.8 %
BH CV ECHO MEAS - IVS/LVPW: 1.15 CM
BH CV ECHO MEAS - IVSD: 1.5 CM
BH CV ECHO MEAS - LA DIMENSION: 3.1 CM
BH CV ECHO MEAS - LAT PEAK E' VEL: 4.1 CM/SEC
BH CV ECHO MEAS - LV DIASTOLIC VOL/BSA (35-75): 35.7 CM2
BH CV ECHO MEAS - LV MASS(C)D: 186.9 GRAMS
BH CV ECHO MEAS - LV SYSTOLIC VOL/BSA (12-30): 15.5 CM2
BH CV ECHO MEAS - LVIDD: 3.7 CM
BH CV ECHO MEAS - LVIDS: 4.1 CM
BH CV ECHO MEAS - LVOT AREA: 2.8 CM2
BH CV ECHO MEAS - LVOT DIAM: 1.9 CM
BH CV ECHO MEAS - LVPWD: 1.3 CM
BH CV ECHO MEAS - MED PEAK E' VEL: 2.8 CM/SEC
BH CV ECHO MEAS - MV A MAX VEL: 50.9 CM/SEC
BH CV ECHO MEAS - MV E MAX VEL: 85.3 CM/SEC
BH CV ECHO MEAS - MV E/A: 1.68
BH CV ECHO MEAS - PA ACC TIME: 0.07 SEC
BH CV ECHO MEAS - RAP SYSTOLE: 10 MMHG
BH CV ECHO MEAS - RVSP: 60.1 MMHG
BH CV ECHO MEAS - SI(MOD-SP4): 20.2 ML/M2
BH CV ECHO MEAS - SV(MOD-SP4): 30.9 ML
BH CV ECHO MEAS - TAPSE (>1.6): 1.53 CM
BH CV ECHO MEAS - TR MAX PG: 50.1 MMHG
BH CV ECHO MEAS - TR MAX VEL: 354 CM/SEC
BH CV ECHO MEASUREMENTS AVERAGE E/E' RATIO: 24.72
BUN SERPL-MCNC: 27 MG/DL (ref 8–23)
BUN/CREAT SERPL: 28.7 (ref 7–25)
CALCIUM SPEC-SCNC: 9 MG/DL (ref 8.6–10.5)
CHLORIDE SERPL-SCNC: 105 MMOL/L (ref 98–107)
CO2 SERPL-SCNC: 22.8 MMOL/L (ref 22–29)
CREAT SERPL-MCNC: 0.94 MG/DL (ref 0.57–1)
DEPRECATED RDW RBC AUTO: 58 FL (ref 37–54)
EGFRCR SERPLBLD CKD-EPI 2021: 66.6 ML/MIN/1.73
EOSINOPHIL # BLD AUTO: 0.38 10*3/MM3 (ref 0–0.4)
EOSINOPHIL NFR BLD AUTO: 3.2 % (ref 0.3–6.2)
ERYTHROCYTE [DISTWIDTH] IN BLOOD BY AUTOMATED COUNT: 17.7 % (ref 12.3–15.4)
GLUCOSE BLDC GLUCOMTR-MCNC: 131 MG/DL (ref 70–130)
GLUCOSE BLDC GLUCOMTR-MCNC: 135 MG/DL (ref 70–130)
GLUCOSE BLDC GLUCOMTR-MCNC: 136 MG/DL (ref 70–130)
GLUCOSE BLDC GLUCOMTR-MCNC: 151 MG/DL (ref 70–130)
GLUCOSE BLDC GLUCOMTR-MCNC: 158 MG/DL (ref 70–130)
GLUCOSE SERPL-MCNC: 127 MG/DL (ref 65–99)
HCT VFR BLD AUTO: 41 % (ref 34–46.6)
HGB BLD-MCNC: 13 G/DL (ref 12–15.9)
IMM GRANULOCYTES # BLD AUTO: 0.05 10*3/MM3 (ref 0–0.05)
IMM GRANULOCYTES NFR BLD AUTO: 0.4 % (ref 0–0.5)
LEFT ATRIUM VOLUME INDEX: 20 ML/M2
LYMPHOCYTES # BLD AUTO: 3.38 10*3/MM3 (ref 0.7–3.1)
LYMPHOCYTES NFR BLD AUTO: 28.3 % (ref 19.6–45.3)
MCH RBC QN AUTO: 28.6 PG (ref 26.6–33)
MCHC RBC AUTO-ENTMCNC: 31.7 G/DL (ref 31.5–35.7)
MCV RBC AUTO: 90.1 FL (ref 79–97)
MONOCYTES # BLD AUTO: 0.78 10*3/MM3 (ref 0.1–0.9)
MONOCYTES NFR BLD AUTO: 6.5 % (ref 5–12)
NEUTROPHILS NFR BLD AUTO: 61.1 % (ref 42.7–76)
NEUTROPHILS NFR BLD AUTO: 7.28 10*3/MM3 (ref 1.7–7)
NRBC BLD AUTO-RTO: 0 /100 WBC (ref 0–0.2)
PLATELET # BLD AUTO: 324 10*3/MM3 (ref 140–450)
PMV BLD AUTO: 9.6 FL (ref 6–12)
POTASSIUM SERPL-SCNC: 3.9 MMOL/L (ref 3.5–5.2)
RBC # BLD AUTO: 4.55 10*6/MM3 (ref 3.77–5.28)
SODIUM SERPL-SCNC: 139 MMOL/L (ref 136–145)
WBC NRBC COR # BLD AUTO: 11.93 10*3/MM3 (ref 3.4–10.8)

## 2024-02-24 PROCEDURE — 94664 DEMO&/EVAL PT USE INHALER: CPT

## 2024-02-24 PROCEDURE — 94799 UNLISTED PULMONARY SVC/PX: CPT

## 2024-02-24 PROCEDURE — C1769 GUIDE WIRE: HCPCS | Performed by: GENERAL PRACTICE

## 2024-02-24 PROCEDURE — 85025 COMPLETE CBC W/AUTO DIFF WBC: CPT | Performed by: STUDENT IN AN ORGANIZED HEALTH CARE EDUCATION/TRAINING PROGRAM

## 2024-02-24 PROCEDURE — C1713 ANCHOR/SCREW BN/BN,TIS/BN: HCPCS | Performed by: GENERAL PRACTICE

## 2024-02-24 PROCEDURE — 94761 N-INVAS EAR/PLS OXIMETRY MLT: CPT

## 2024-02-24 PROCEDURE — 25810000003 LACTATED RINGERS PER 1000 ML: Performed by: NURSE ANESTHETIST, CERTIFIED REGISTERED

## 2024-02-24 PROCEDURE — 25010000002 DIPHENHYDRAMINE PER 50 MG: Performed by: STUDENT IN AN ORGANIZED HEALTH CARE EDUCATION/TRAINING PROGRAM

## 2024-02-24 PROCEDURE — 99233 SBSQ HOSP IP/OBS HIGH 50: CPT | Performed by: STUDENT IN AN ORGANIZED HEALTH CARE EDUCATION/TRAINING PROGRAM

## 2024-02-24 PROCEDURE — 25010000002 ZIPRASIDONE MESYLATE PER 10 MG: Performed by: STUDENT IN AN ORGANIZED HEALTH CARE EDUCATION/TRAINING PROGRAM

## 2024-02-24 PROCEDURE — 80048 BASIC METABOLIC PNL TOTAL CA: CPT | Performed by: STUDENT IN AN ORGANIZED HEALTH CARE EDUCATION/TRAINING PROGRAM

## 2024-02-24 PROCEDURE — 25010000002 ONDANSETRON PER 1 MG: Performed by: NURSE ANESTHETIST, CERTIFIED REGISTERED

## 2024-02-24 PROCEDURE — 25010000002 DEXAMETHASONE PER 1 MG: Performed by: NURSE ANESTHETIST, CERTIFIED REGISTERED

## 2024-02-24 PROCEDURE — 25010000002 PROPOFOL 10 MG/ML EMULSION: Performed by: NURSE ANESTHETIST, CERTIFIED REGISTERED

## 2024-02-24 PROCEDURE — 25010000002 NEOSTIGMINE 10 MG/10ML SOLUTION: Performed by: NURSE ANESTHETIST, CERTIFIED REGISTERED

## 2024-02-24 PROCEDURE — 63710000001 INSULIN LISPRO (HUMAN) PER 5 UNITS: Performed by: GENERAL PRACTICE

## 2024-02-24 PROCEDURE — 25010000002 MORPHINE PER 10 MG: Performed by: INTERNAL MEDICINE

## 2024-02-24 PROCEDURE — 97162 PT EVAL MOD COMPLEX 30 MIN: CPT

## 2024-02-24 PROCEDURE — 25010000002 GLYCOPYRROLATE 0.4 MG/2ML SOLUTION: Performed by: NURSE ANESTHETIST, CERTIFIED REGISTERED

## 2024-02-24 PROCEDURE — 25010000002 ENOXAPARIN PER 10 MG: Performed by: GENERAL PRACTICE

## 2024-02-24 PROCEDURE — 25010000002 FENTANYL CITRATE (PF) 50 MCG/ML SOLUTION: Performed by: NURSE ANESTHETIST, CERTIFIED REGISTERED

## 2024-02-24 PROCEDURE — 0QS636Z REPOSITION RIGHT UPPER FEMUR WITH INTRAMEDULLARY INTERNAL FIXATION DEVICE, PERCUTANEOUS APPROACH: ICD-10-PCS | Performed by: GENERAL PRACTICE

## 2024-02-24 PROCEDURE — 25010000002 MORPHINE PER 10 MG: Performed by: GENERAL PRACTICE

## 2024-02-24 PROCEDURE — 76000 FLUOROSCOPY <1 HR PHYS/QHP: CPT

## 2024-02-24 PROCEDURE — 25010000002 CEFAZOLIN PER 500 MG: Performed by: GENERAL PRACTICE

## 2024-02-24 PROCEDURE — 82948 REAGENT STRIP/BLOOD GLUCOSE: CPT

## 2024-02-24 DEVICE — SCRW PERF TFN ADV TI 10.5X95MM STRL: Type: IMPLANTABLE DEVICE | Site: FEMUR | Status: FUNCTIONAL

## 2024-02-24 DEVICE — SCRW IM TFN/ADVANCED LK XL25 TI 5X36MM LITE/GRN STRL: Type: IMPLANTABLE DEVICE | Site: FEMUR | Status: FUNCTIONAL

## 2024-02-24 DEVICE — NAIL FEM TFN ADV PROX 130D SHT 10X170MM STRL: Type: IMPLANTABLE DEVICE | Site: FEMUR | Status: FUNCTIONAL

## 2024-02-24 RX ORDER — ZIPRASIDONE MESYLATE 20 MG/ML
INJECTION, POWDER, LYOPHILIZED, FOR SOLUTION INTRAMUSCULAR
Status: DISPENSED
Start: 2024-02-24 | End: 2024-02-25

## 2024-02-24 RX ORDER — OXYBUTYNIN CHLORIDE 5 MG/1
10 TABLET, EXTENDED RELEASE ORAL DAILY
Status: DISCONTINUED | OUTPATIENT
Start: 2024-02-24 | End: 2024-02-28 | Stop reason: HOSPADM

## 2024-02-24 RX ORDER — GLYCOPYRROLATE 0.2 MG/ML
INJECTION INTRAMUSCULAR; INTRAVENOUS AS NEEDED
Status: DISCONTINUED | OUTPATIENT
Start: 2024-02-24 | End: 2024-02-24 | Stop reason: SURG

## 2024-02-24 RX ORDER — ENOXAPARIN SODIUM 100 MG/ML
40 INJECTION SUBCUTANEOUS EVERY 24 HOURS
Status: DISCONTINUED | OUTPATIENT
Start: 2024-02-25 | End: 2024-02-28 | Stop reason: HOSPADM

## 2024-02-24 RX ORDER — PROPOFOL 10 MG/ML
VIAL (ML) INTRAVENOUS AS NEEDED
Status: DISCONTINUED | OUTPATIENT
Start: 2024-02-24 | End: 2024-02-24 | Stop reason: SURG

## 2024-02-24 RX ORDER — IPRATROPIUM BROMIDE AND ALBUTEROL SULFATE 2.5; .5 MG/3ML; MG/3ML
3 SOLUTION RESPIRATORY (INHALATION) ONCE AS NEEDED
Status: DISCONTINUED | OUTPATIENT
Start: 2024-02-24 | End: 2024-02-24 | Stop reason: HOSPADM

## 2024-02-24 RX ORDER — OLANZAPINE 10 MG/1
10 TABLET, ORALLY DISINTEGRATING ORAL NIGHTLY
Status: DISCONTINUED | OUTPATIENT
Start: 2024-02-25 | End: 2024-02-28 | Stop reason: HOSPADM

## 2024-02-24 RX ORDER — OXYCODONE HYDROCHLORIDE AND ACETAMINOPHEN 5; 325 MG/1; MG/1
1 TABLET ORAL ONCE AS NEEDED
Status: DISCONTINUED | OUTPATIENT
Start: 2024-02-24 | End: 2024-02-24 | Stop reason: HOSPADM

## 2024-02-24 RX ORDER — EPHEDRINE SULFATE 5 MG/ML
INJECTION INTRAVENOUS AS NEEDED
Status: DISCONTINUED | OUTPATIENT
Start: 2024-02-24 | End: 2024-02-24 | Stop reason: SURG

## 2024-02-24 RX ORDER — HYDROCODONE BITARTRATE AND ACETAMINOPHEN 7.5; 325 MG/1; MG/1
1 TABLET ORAL EVERY 6 HOURS PRN
Status: DISCONTINUED | OUTPATIENT
Start: 2024-02-24 | End: 2024-02-28 | Stop reason: HOSPADM

## 2024-02-24 RX ORDER — FENTANYL CITRATE 50 UG/ML
INJECTION, SOLUTION INTRAMUSCULAR; INTRAVENOUS AS NEEDED
Status: DISCONTINUED | OUTPATIENT
Start: 2024-02-24 | End: 2024-02-24 | Stop reason: SURG

## 2024-02-24 RX ORDER — DIPHENHYDRAMINE HYDROCHLORIDE 50 MG/ML
25 INJECTION INTRAMUSCULAR; INTRAVENOUS ONCE
Status: COMPLETED | OUTPATIENT
Start: 2024-02-24 | End: 2024-02-24

## 2024-02-24 RX ORDER — MAGNESIUM HYDROXIDE 1200 MG/15ML
LIQUID ORAL AS NEEDED
Status: DISCONTINUED | OUTPATIENT
Start: 2024-02-24 | End: 2024-02-24 | Stop reason: HOSPADM

## 2024-02-24 RX ORDER — FENTANYL CITRATE 50 UG/ML
50 INJECTION, SOLUTION INTRAMUSCULAR; INTRAVENOUS
Status: DISCONTINUED | OUTPATIENT
Start: 2024-02-24 | End: 2024-02-24 | Stop reason: HOSPADM

## 2024-02-24 RX ORDER — WATER 10 ML/10ML
INJECTION INTRAMUSCULAR; INTRAVENOUS; SUBCUTANEOUS
Status: DISPENSED
Start: 2024-02-24 | End: 2024-02-25

## 2024-02-24 RX ORDER — SODIUM CHLORIDE, SODIUM LACTATE, POTASSIUM CHLORIDE, CALCIUM CHLORIDE 600; 310; 30; 20 MG/100ML; MG/100ML; MG/100ML; MG/100ML
INJECTION, SOLUTION INTRAVENOUS CONTINUOUS PRN
Status: DISCONTINUED | OUTPATIENT
Start: 2024-02-24 | End: 2024-02-24 | Stop reason: SURG

## 2024-02-24 RX ORDER — ROCURONIUM BROMIDE 10 MG/ML
INJECTION, SOLUTION INTRAVENOUS AS NEEDED
Status: DISCONTINUED | OUTPATIENT
Start: 2024-02-24 | End: 2024-02-24 | Stop reason: SURG

## 2024-02-24 RX ORDER — TRANEXAMIC ACID 100 MG/ML
INJECTION, SOLUTION INTRAVENOUS AS NEEDED
Status: DISCONTINUED | OUTPATIENT
Start: 2024-02-24 | End: 2024-02-24 | Stop reason: SURG

## 2024-02-24 RX ORDER — DEXAMETHASONE SODIUM PHOSPHATE 4 MG/ML
INJECTION, SOLUTION INTRA-ARTICULAR; INTRALESIONAL; INTRAMUSCULAR; INTRAVENOUS; SOFT TISSUE AS NEEDED
Status: DISCONTINUED | OUTPATIENT
Start: 2024-02-24 | End: 2024-02-24 | Stop reason: SURG

## 2024-02-24 RX ORDER — FAMOTIDINE 10 MG/ML
INJECTION, SOLUTION INTRAVENOUS AS NEEDED
Status: DISCONTINUED | OUTPATIENT
Start: 2024-02-24 | End: 2024-02-24 | Stop reason: SURG

## 2024-02-24 RX ORDER — LIDOCAINE HYDROCHLORIDE 20 MG/ML
INJECTION, SOLUTION EPIDURAL; INFILTRATION; INTRACAUDAL; PERINEURAL AS NEEDED
Status: DISCONTINUED | OUTPATIENT
Start: 2024-02-24 | End: 2024-02-24 | Stop reason: SURG

## 2024-02-24 RX ORDER — ONDANSETRON 2 MG/ML
INJECTION INTRAMUSCULAR; INTRAVENOUS AS NEEDED
Status: DISCONTINUED | OUTPATIENT
Start: 2024-02-24 | End: 2024-02-24 | Stop reason: SURG

## 2024-02-24 RX ORDER — ONDANSETRON 2 MG/ML
4 INJECTION INTRAMUSCULAR; INTRAVENOUS AS NEEDED
Status: DISCONTINUED | OUTPATIENT
Start: 2024-02-24 | End: 2024-02-24 | Stop reason: HOSPADM

## 2024-02-24 RX ORDER — NEOSTIGMINE METHYLSULFATE 1 MG/ML
INJECTION, SOLUTION INTRAVENOUS AS NEEDED
Status: DISCONTINUED | OUTPATIENT
Start: 2024-02-24 | End: 2024-02-24 | Stop reason: SURG

## 2024-02-24 RX ORDER — SODIUM CHLORIDE, SODIUM LACTATE, POTASSIUM CHLORIDE, CALCIUM CHLORIDE 600; 310; 30; 20 MG/100ML; MG/100ML; MG/100ML; MG/100ML
100 INJECTION, SOLUTION INTRAVENOUS ONCE AS NEEDED
Status: DISCONTINUED | OUTPATIENT
Start: 2024-02-24 | End: 2024-02-24 | Stop reason: HOSPADM

## 2024-02-24 RX ADMIN — Medication 10 ML: at 22:10

## 2024-02-24 RX ADMIN — MONTELUKAST SODIUM 10 MG: 10 TABLET, COATED ORAL at 22:08

## 2024-02-24 RX ADMIN — PROPOFOL 100 MG: 10 INJECTION, EMULSION INTRAVENOUS at 10:14

## 2024-02-24 RX ADMIN — ASPIRIN 81 MG: 81 TABLET, COATED ORAL at 08:11

## 2024-02-24 RX ADMIN — TRANEXAMIC ACID 1000 MG: 100 INJECTION, SOLUTION INTRAVENOUS at 10:08

## 2024-02-24 RX ADMIN — AMITRIPTYLINE HYDROCHLORIDE 100 MG: 50 TABLET, FILM COATED ORAL at 22:07

## 2024-02-24 RX ADMIN — DIPHENHYDRAMINE HYDROCHLORIDE 25 MG: 50 INJECTION, SOLUTION INTRAMUSCULAR; INTRAVENOUS at 14:20

## 2024-02-24 RX ADMIN — ONDANSETRON 4 MG: 2 INJECTION INTRAMUSCULAR; INTRAVENOUS at 10:08

## 2024-02-24 RX ADMIN — ROCURONIUM BROMIDE 25 MG: 10 SOLUTION INTRAVENOUS at 10:14

## 2024-02-24 RX ADMIN — METOPROLOL TARTRATE 12.5 MG: 25 TABLET, FILM COATED ORAL at 22:05

## 2024-02-24 RX ADMIN — EPHEDRINE SULFATE 5 MG: 5 INJECTION INTRAVENOUS at 10:10

## 2024-02-24 RX ADMIN — NEOSTIGMINE METHYLSULFATE 3 MG: 0.5 INJECTION INTRAVENOUS at 10:53

## 2024-02-24 RX ADMIN — ROSUVASTATIN CALCIUM 20 MG: 20 TABLET, FILM COATED ORAL at 22:06

## 2024-02-24 RX ADMIN — MORPHINE SULFATE 1 MG: 2 INJECTION, SOLUTION INTRAMUSCULAR; INTRAVENOUS at 08:25

## 2024-02-24 RX ADMIN — FAMOTIDINE 20 MG: 10 INJECTION, SOLUTION INTRAVENOUS at 10:08

## 2024-02-24 RX ADMIN — EPHEDRINE SULFATE 5 MG: 5 INJECTION INTRAVENOUS at 10:28

## 2024-02-24 RX ADMIN — DEXAMETHASONE SODIUM PHOSPHATE 4 MG: 4 INJECTION, SOLUTION INTRA-ARTICULAR; INTRALESIONAL; INTRAMUSCULAR; INTRAVENOUS; SOFT TISSUE at 10:14

## 2024-02-24 RX ADMIN — ALLOPURINOL 100 MG: 100 TABLET ORAL at 08:11

## 2024-02-24 RX ADMIN — OLANZAPINE 5 MG: 5 TABLET, ORALLY DISINTEGRATING ORAL at 22:17

## 2024-02-24 RX ADMIN — ZIPRASIDONE MESYLATE 20 MG: 20 INJECTION, POWDER, LYOPHILIZED, FOR SOLUTION INTRAMUSCULAR at 14:08

## 2024-02-24 RX ADMIN — LIDOCAINE HYDROCHLORIDE 50 MG: 20 INJECTION, SOLUTION EPIDURAL; INFILTRATION; INTRACAUDAL; PERINEURAL at 10:14

## 2024-02-24 RX ADMIN — ZIPRASIDONE MESYLATE 20 MG: 20 INJECTION, POWDER, LYOPHILIZED, FOR SOLUTION INTRAMUSCULAR at 20:07

## 2024-02-24 RX ADMIN — FENTANYL CITRATE 100 MCG: 50 INJECTION INTRAMUSCULAR; INTRAVENOUS at 10:08

## 2024-02-24 RX ADMIN — HYDROCODONE BITARTRATE AND ACETAMINOPHEN 1 TABLET: 7.5; 325 TABLET ORAL at 22:07

## 2024-02-24 RX ADMIN — FLUOXETINE HYDROCHLORIDE 40 MG: 20 CAPSULE ORAL at 08:11

## 2024-02-24 RX ADMIN — EPHEDRINE SULFATE 10 MG: 5 INJECTION INTRAVENOUS at 10:49

## 2024-02-24 RX ADMIN — MORPHINE SULFATE 1 MG: 2 INJECTION, SOLUTION INTRAMUSCULAR; INTRAVENOUS at 17:49

## 2024-02-24 RX ADMIN — TRANEXAMIC ACID 1000 MG: 100 INJECTION, SOLUTION INTRAVENOUS at 10:50

## 2024-02-24 RX ADMIN — ENOXAPARIN SODIUM 40 MG: 40 INJECTION SUBCUTANEOUS at 23:48

## 2024-02-24 RX ADMIN — DIPHENHYDRAMINE HYDROCHLORIDE 25 MG: 50 INJECTION, SOLUTION INTRAMUSCULAR; INTRAVENOUS at 22:05

## 2024-02-24 RX ADMIN — INSULIN LISPRO 2 UNITS: 100 INJECTION, SOLUTION INTRAVENOUS; SUBCUTANEOUS at 17:23

## 2024-02-24 RX ADMIN — CEFAZOLIN 2000 MG: 2 INJECTION, POWDER, FOR SOLUTION INTRAMUSCULAR; INTRAVENOUS at 08:52

## 2024-02-24 RX ADMIN — CETIRIZINE HYDROCHLORIDE 10 MG: 10 TABLET, FILM COATED ORAL at 08:11

## 2024-02-24 RX ADMIN — IPRATROPIUM BROMIDE AND ALBUTEROL SULFATE 3 ML: 2.5; .5 SOLUTION RESPIRATORY (INHALATION) at 07:28

## 2024-02-24 RX ADMIN — GLYCOPYRROLATE 0.4 MG: 0.4 INJECTION INTRAMUSCULAR; INTRAVENOUS at 10:53

## 2024-02-24 RX ADMIN — BUDESONIDE AND FORMOTEROL FUMARATE DIHYDRATE 2 PUFF: 160; 4.5 AEROSOL RESPIRATORY (INHALATION) at 07:28

## 2024-02-24 RX ADMIN — IPRATROPIUM BROMIDE AND ALBUTEROL SULFATE 3 ML: 2.5; .5 SOLUTION RESPIRATORY (INHALATION) at 00:01

## 2024-02-24 RX ADMIN — CEFAZOLIN 1000 MG: 1 INJECTION, POWDER, FOR SOLUTION INTRAMUSCULAR; INTRAVENOUS; PARENTERAL at 15:29

## 2024-02-24 RX ADMIN — EPHEDRINE SULFATE 5 MG: 5 INJECTION INTRAVENOUS at 10:34

## 2024-02-24 RX ADMIN — SODIUM CHLORIDE, POTASSIUM CHLORIDE, SODIUM LACTATE AND CALCIUM CHLORIDE: 600; 310; 30; 20 INJECTION, SOLUTION INTRAVENOUS at 10:08

## 2024-02-24 RX ADMIN — CEFAZOLIN 1000 MG: 1 INJECTION, POWDER, FOR SOLUTION INTRAMUSCULAR; INTRAVENOUS; PARENTERAL at 22:04

## 2024-02-24 RX ADMIN — MORPHINE SULFATE 1 MG: 2 INJECTION, SOLUTION INTRAMUSCULAR; INTRAVENOUS at 01:23

## 2024-02-24 RX ADMIN — EPHEDRINE SULFATE 5 MG: 5 INJECTION INTRAVENOUS at 10:19

## 2024-02-24 NOTE — PLAN OF CARE
Goal Outcome Evaluation:           Progress: no change  Outcome Evaluation: Pt has been resting in bed through the night. Pt complained of pain, PRN pain meds given per orders. Family at bedside. Pt still remains confused and on bedrest. No acute changes or concerns at this time.

## 2024-02-24 NOTE — ANESTHESIA POSTPROCEDURE EVALUATION
Patient: Breanne Dickson    Procedure Summary       Date: 02/24/24 Room / Location:  [facility] 03 /  [facility]    Anesthesia Start: 1008 Anesthesia Stop: 1103    Procedure: FEMUR INTRAMEDULLARY NAILING/RODDING (Right: Thigh) Diagnosis:       Closed nondisplaced intertrochanteric fracture of right femur, initial encounter      (Closed nondisplaced intertrochanteric fracture of right femur, initial encounter [S72.144A])    Surgeons: Brijesh Burton MD Provider: Ric Welch DO    Anesthesia Type: general ASA Status: 3            Anesthesia Type: general    Vitals  Vitals Value Taken Time   BP 86/57 02/24/24 1136   Temp 98.9 °F (37.2 °C) 02/24/24 1131   Pulse 82 02/24/24 1137   Resp 22 02/24/24 1136   SpO2 95 % 02/24/24 1137   Vitals shown include unfiled device data.        Post Anesthesia Care and Evaluation    Patient location during evaluation: PHASE II  Patient participation: complete - patient participated  Level of consciousness: awake and alert  Pain score: 1  Pain management: adequate    Airway patency: patent  Anesthetic complications: No anesthetic complications  PONV Status: controlled  Cardiovascular status: acceptable  Respiratory status: acceptable  Hydration status: acceptable

## 2024-02-24 NOTE — ANESTHESIA PROCEDURE NOTES
Airway  Urgency: elective    Date/Time: 2/24/2024 10:14 AM  Airway not difficult    General Information and Staff    Patient location during procedure: OR  CRNA/CAA: Radha Jimenes CRNA    Indications and Patient Condition  Indications for airway management: airway protection    Preoxygenated: yes  MILS not maintained throughout  Mask difficulty assessment: 0 - not attempted    Final Airway Details  Final airway type: endotracheal airway      Successful airway: ETT  Cuffed: yes   Successful intubation technique: direct laryngoscopy  Endotracheal tube insertion site: oral  Blade: Anne-Marie  Blade size: 3  ETT size (mm): 7.0  Cormack-Lehane Classification: grade I - full view of glottis  Placement verified by: chest auscultation and capnometry   Measured from: lips  ETT/EBT  to lips (cm): 20  Number of attempts at approach: 1  Assessment: lips, teeth, and gum same as pre-op and atraumatic intubation    Additional Comments  Atraumatic ETT placement, dentition unchanged.

## 2024-02-24 NOTE — PLAN OF CARE
Goal Outcome Evaluation:  Plan of Care Reviewed With: patient        Progress: no change  Outcome Evaluation: Patient resting in bed at this time. Patient C/O pain this shift; pain meds given per MAR. Received a 500mL blus of LR this shift for hypotension after surgery. No acute changes noted at this time. Will continue with POC.

## 2024-02-24 NOTE — NURSING NOTE
1 hour face to face following a brief hold that began at 1400 and ended at 1430.  Pt is calm and friendly with family and Dr. Alonso at bedside and need for brief hold to end was met per Dr. Alonso.No signs of agitation or aggression noted. Vital signs. 98/64 97 20 97.9  02 97% 2lnc

## 2024-02-24 NOTE — PROGRESS NOTES
[facility] HOSPITALIST PROGRESS NOTE     Patient Identification:  Name:  Breanne Dickson  Age:  68 y.o.  Sex:  female  :  1955  MRN:  [id number]  Visit Number:  [id number]  ROOM: [address] [place] [address]     Primary Care Provider:  Mery Coulter DO    Length of stay in inpatient status:  2    Subjective     Chief Compliant:    Chief Complaint   Patient presents with    Altered Mental Status       History of Presenting Illness:    Patient seen in follow-up for right hip fracture and gluteus muscle tear.  This morning she is awake, at mental baseline given dementia.  Family present at bedside.  Denies any pain.  Afebrile overnight.  96% on 1 to 2 L nasal cannula.  No adverse events noted overnight.    -----  After returning from the OR, patient was encephalopathic, from anesthesia, confused, disoriented.  She was aggressive with staff a brief hold was applied to prevent the patient from falling given recent surgery.  Patient received medications and reevaluated afterwards in which she was back to baseline, pleasant and had no recollection of the events.  Family present at bedside and updated    Objective     Current Hospital Meds:allopurinol, 100 mg, Oral, Daily  amitriptyline, 100 mg, Oral, Nightly  aspirin, 81 mg, Oral, Daily  budesonide-formoterol, 2 puff, Inhalation, BID - RT  ceFAZolin, 1,000 mg, Intravenous, Q8H  cetirizine, 10 mg, Oral, Daily  [START ON 2024] cholecalciferol, 50,000 Units, Oral, Weekly  [START ON 2024] enoxaparin, 40 mg, Subcutaneous, Q24H  FLUoxetine, 40 mg, Oral, Daily  insulin lispro, 2-7 Units, Subcutaneous, 4x Daily AC & at Bedtime  ipratropium-albuterol, 3 mL, Nebulization, 4x Daily - RT  metoprolol tartrate, 25 mg, Oral, Q12H  montelukast, 10 mg, Oral, Nightly  nicotine, 1 patch, Transdermal, Q24H  OLANZapine zydis, 5 mg, Oral, Nightly  pantoprazole, 40 mg, Oral, Q AM  rosuvastatin, 20 mg, Oral, Nightly  sodium chloride, 10 mL, Intravenous, Q12H         Current  Antimicrobial Therapy:  Anti-Infectives (From admission, onward)      Ordered     Dose/Rate Route Frequency Start Stop    02/24/24 1147  ceFAZolin 1000 mg IVPB in 100 mL NS (VTB)        Ordering Provider: Brijesh Burton MD    1,000 mg  over 30 Minutes Intravenous Every 8 Hours 02/24/24 1300 02/25/24 1259    02/24/24 0823  ceFAZolin 2000 mg IVPB in 100 mL NS (VTB)        Ordering Provider: Brijesh Burton MD    2,000 mg  over 30 Minutes Intravenous Once 02/24/24 0915 02/24/24 0922    02/22/24 1610  vancomycin 1250 mg/250 mL 0.9% NS IVPB ([facility])        Ordering Provider: Gabriela Savage PA-C    20 mg/kg × 59.4 kg  over 75 Minutes Intravenous Once 02/22/24 1700 02/22/24 2010 02/22/24 1610  cefepime 2000 mg IVPB in 100 mL NS (VTB)        Ordering Provider: Gabriela Savage PA-C    2,000 mg  over 30 Minutes Intravenous Once 02/22/24 1626 02/22/24 1731          Current Diuretic Therapy:  Diuretics (From admission, onward)      Ordered     Dose/Rate Route Frequency Start Stop    02/22/24 1635  furosemide (LASIX) injection 80 mg        Ordering Provider: Gabriela Savgae PA-C    80 mg Intravenous Once 02/22/24 1651 02/22/24 1705          ----------------------------------------------------------------------------------------------------------------------  Vital Signs:  Temp:  [97.4 °F (36.3 °C)-98.9 °F (37.2 °C)] 97.4 °F (36.3 °C)  Heart Rate:  [] 84  Resp:  [17-27] 20  BP: ()/(55-75) 88/55  SpO2:  [90 %-99 %] 97 %  on  Flow (L/min):  [1-2] 2;   Device (Oxygen Therapy): nasal cannula  Body mass index is 26.05 kg/m².    Wt Readings from Last 3 Encounters:   02/22/24 58.5 kg (129 lb)   05/27/19 75.8 kg (167 lb)   12/23/17 79.4 kg (175 lb)     Intake & Output (last 3 days)         02/20 0701  02/21 0700 02/21 0701  02/22 0700 02/22 0701  02/23 0700 02/23 0701  02/24 0700    P.O.   0     IV Piggyback   750     Total Intake(mL/kg)   750 (12.8)     Net   +750             Urine Unmeasured Occurrence   1 x            Diet: Regular/House Diet; Texture: Regular Texture (IDDSI 7); Fluid Consistency: Thin (IDDSI 0)  ----------------------------------------------------------------------------------------------------------------------  Physical exam:  Constitutional: Elderly female, Well-developed and well-nourished, resting comfortably in bed, no acute distress.      HENT:  Head:  Normocephalic and atraumatic.  Mouth:  Moist mucous membranes.    Eyes:  Conjunctivae and EOM are normal. No scleral icterus.   Cardiovascular:  Normal rate, regular rhythm and normal heart sounds with no murmur. No JVD.   Pulmonary/Chest:  No respiratory distress, no wheezes, no crackles, with normal breath sounds and good air movement. Unlabored. No accessory muscle use.  Abdominal:  Soft. No distension and no tenderness.  Bowel sounds present. No rebound or guarding.   Musculoskeletal:  No tenderness, and no deformity.  No red or swollen joints anywhere.    Neurological:  Alert and oriented to person, place but not time.  No cranial nerve deficit.   Nonfocal.   Skin:  Skin is warm and dry. No rash noted. No pallor.   Peripheral vascular:  No clubbing, no cyanosis, no edema. Pedal and tibial pulses 2 out of 4 bilaterally.  Neurovascularly intact.    Unchanged from 2/23/2024  Electronically signed by Dashawn Alonso DO, 02/24/24, 12:33 PM EST.      ----------------------------------------------------------------------------------------------------------------------  Results from last 7 days   Lab Units 02/24/24  0201 02/23/24  0201 02/22/24  1619 02/22/24  1518   CRP mg/dL  --   --   --  8.72*   LACTATE mmol/L  --   --  1.2  --    WBC 10*3/mm3 11.93* 10.55  --  13.08*   HEMOGLOBIN g/dL 13.0 13.5  --  13.6   HEMATOCRIT % 41.0 41.5  --  42.1   MCV fL 90.1 88.5  --  89.2   MCHC g/dL 31.7 32.5  --  32.3   PLATELETS 10*3/mm3 324 341  --  336   INR   --  1.03  --   --      Results from last 7 days   Lab Units 02/22/24  1420   PH, ARTERIAL pH units  "7.435   PO2 ART mm Hg 53.6*   PCO2, ARTERIAL mm Hg 31.5*   HCO3 ART mmol/L 21.1     Results from last 7 days   Lab Units 02/24/24  0201 02/23/24  0201 02/22/24  1518   SODIUM mmol/L 139 140 138   POTASSIUM mmol/L 3.9 3.8 4.0   CHLORIDE mmol/L 105 101 103   CO2 mmol/L 22.8 25.4 22.6   BUN mg/dL 27* 26* 27*   CREATININE mg/dL 0.94 0.96 0.94   CALCIUM mg/dL 9.0 9.6 9.6   GLUCOSE mg/dL 127* 155* 91   ALBUMIN g/dL  --  3.8 3.8   BILIRUBIN mg/dL  --  0.4 0.3   ALK PHOS U/L  --  84 88   AST (SGOT) U/L  --  18 21   ALT (SGPT) U/L  --  16 18   Estimated Creatinine Clearance: 53.6 mL/min (by C-G formula based on SCr of 0.94 mg/dL).  No results found for: \"AMMONIA\"  Results from last 7 days   Lab Units 02/22/24  1922 02/22/24  1518   HSTROP T ng/L 50* 53*     Results from last 7 days   Lab Units 02/22/24  1518   PROBNP pg/mL 22,342.0*         Glucose   Date/Time Value Ref Range Status   02/24/2024 1113 151 (H) 70 - 130 mg/dL Final   02/24/2024 0849 136 (H) 70 - 130 mg/dL Final   02/24/2024 0712 135 (H) 70 - 130 mg/dL Final   02/23/2024 1949 114 70 - 130 mg/dL Final   02/23/2024 1710 99 70 - 130 mg/dL Final   02/23/2024 1643 57 (L) 70 - 130 mg/dL Final   02/23/2024 1119 221 (H) 70 - 130 mg/dL Final   02/23/2024 0627 98 70 - 130 mg/dL Final     Lab Results   Component Value Date    TSH 0.915 02/22/2024     No results found for: \"PREGTESTUR\", \"PREGSERUM\", \"HCG\", \"HCGQUANT\"  Pain Management Panel           No data to display              Brief Urine Lab Results  (Last result in the past 365 days)        Color   Clarity   Blood   Leuk Est   Nitrite   Protein   CREAT   Urine HCG        02/22/24 1708 Yellow   Clear   Negative   Negative   Negative   Negative                 No results found for: \"BLOODCX\"  No results found for: \"URINECX\"  No results found for: \"WOUNDCX\"  No results found for: \"STOOLCX\"  No results found for: \"RESPCX\"  No results found for: \"AFBCX\"  Results from last 7 days   Lab Units 02/22/24  1619 02/22/24  1518 "   PROCALCITONIN ng/mL  --  0.14   LACTATE mmol/L 1.2  --    CRP mg/dL  --  8.72*       I have personally looked at the labs and they are summarized above.  ----------------------------------------------------------------------------------------------------------------------  Detailed radiology reports for the last 24 hours:  Imaging Results (Last 24 Hours)       Procedure Component Value Units Date/Time    FL Surgery Fluoro [[id number]] Resulted: 02/24/24 0948     Updated: 02/24/24 1133          Assessment & Plan      Patient is a 68-year-old female with history significant for type 2 diabetes mellitus, hypertension hyperlipidemia who presented to the ER with complaints of right hip pain and intermittent issues ambulating per family at bedside.    #Acute intertrochanteric fracture of the right femur  #Status post mechanical fall  #Right gluteus media muscle tear  --Patient presented to the ER with initial complaints of right hip pain and having difficulty ambulating, getting up from the toilet after several recent falls.  Although initial CT in the ER noted right hip fracture, there was questions whether this was operable or nonsurgical management.  I witnessed patient ambulating throughout the hallway from her room to the bathroom unassisted and was doing quite well prior to MRI results.  --Etiology of frequent falls at home is likely due to hypoxia which is undoubtably chronic given patient's significant structural lung disease  --MRI noted right intertrochanteric hip fracture and gluteus media muscle tear  --Neurovascularly intact  --Status post cephalomedullary nail 2/24  --As needed pain meds, antiemetics if needed  --PT/OT postop, family requested IPR, consult placed  --Ortho following, appreciate recommendations    #Chronic hypoxic respiratory failure due to fibrosis  #COPD, not in acute exacerbation  --Patient was not on O2 at baseline prior to arrival however given SpO2 in the low 90s, ABG noted a PaO2 of  53.  Given the structural parenchymal changes on imaging with fibrosis, highly suspect this is chronic and contributing to the above fracture  --As needed nebulizers, Symbicort  --Plan to follow-up outpatient in the COPD clinic    #NSTEMI, likely type II  #Severe pulmonary hypertension  --Denies any chest pain/pressure or tightness, hypoxia likely the culprit  --Initial troponin 53, repeat 50 with a delta of -3  --proBNP 22 K  --Echocardiogram noted EF 46 to 50%, diastolic dysfunction, severe pulmonary hypertension, RVSP 60  --Continue aspirin, high intensity statin, beta-blocker    #Acute urinary retention  --Status post anchored [place]  --Oxybutynin daily  --Give voiding trial in 1 to 2 days postop    #Type 2 diabetes mellitus, hold metformin, add SSI if needed  #Essential hypertension, normotensive, hold meds  #Hyperlipidemia, high intensity statin  #Current smoker, nicotine patch  #Dementia without behavioral disturbances, continue new Zyprexa at bedtime  #Reactive leukocytosis    CHECKLIST:  Abx: None  VTE: Lovenox  GI ppx: None  Diet: Consistent carb  Code: CPR, full  Dispo: Patient is high risk due to right hip fracture requiring orthopedic intervention and repair.  Family has requested inpatient rehab ultimate disposition plans to return home with family.    Dashawn Alonso DO  [facility]  02/24/24  12:31 EST

## 2024-02-24 NOTE — PLAN OF CARE
Goal Outcome Evaluation:               Pt remains calm with family at bedside.

## 2024-02-24 NOTE — NURSING NOTE
Pt returned from left hip surgery this afternoon. Nursing staff called to room by daughters at bedside. House Supervisor, Dora Amin RN lead nurse Ashley Lozano RN. Pt has pulled IV out, trying to get out of bed. Pt is hitting, kicking, cursing and trying to bite daughters and staff. Pts hands held by staff and daughters while Dr. Alonso being contacted.    IV restarted. Pt given Geodon 20 mg IM and Benadryl 25mg  IV. 15 mins after meds received, pt relaxed in bed and daughters remain at bedside.  Brief hold began at 1400 and ended at 1430.

## 2024-02-24 NOTE — OP NOTE
Breanne Randolph  2/24/2024      Operative Note:    Surgeon: Brijesh Burton MD    Assistant: GUERRERO Peters    Preoperative Diagnosis:   Right intertrochanteric hip fracture, nondisplaced    Postoperative Diagnosis:   Same    Procedure(s):    1.  Right intertrochanteric hip fracture repair with short cephalomedullary nail, CPT code 16051  2.  Intraoperative use of fluoroscopy, CPT code 05982    Anesthesia: General    Estimated Blood Loss: 50 cc    Specimen Obtained:  None    Complication(s):  None apparent.     Implants: Synthes short cephalomedullary nail, 130 degrees, 10 mm, 95 mm lag screw, 36 mm static locking screw    Operative Indication:     the patient is a 68-year-old who fell and sustained the above injury.  She is a community ambulator without any assistive devices.  Due to the acute nature of her injury she elected to proceed with the above operation.    Operative Details:    The patient was met in the preoperative suite where the correct operative site was marked. The patient was brought to the operating room where anesthesia was initiated. The patient was positioned appropriately with all bony prominences well-padded. The patient was prepped and draped in the usual sterile fashion and prior to incision a timeout was observed to verify the correct operative site, procedure and antibiotics.    A longitudinal incision on the proximal lateral aspect of thigh was taken down to the greater trochanter.  A guidepin was utilized to gain access to the medullary canal and advanced down to the level of the lesser.  Once checking the AP and lateral projections and entry reamer was utilized.  11 mm reamer was utilized to sound the canal.  11 reamer was removed and a 10 mm nail was malleted into position.  A second incision was then made to place a guidepin in the center center position of the femoral head.  Once this was measured a reamer and a lag screw was then placed.  A setscrew was placed proximally.  Then x-ray was  directed to the mid thigh where a static locking screw was placed with the aid of intraoperative fluoroscopy.  Final intraoperative x-rays show satisfactory reduction and placement of the hardware.  All wounds were irrigated thoroughly with sterile saline.  The wound was closed in standard layered fashion.  A soft dressing was applied.  Patient was extubated, transfer hospital bed in the PACU stable condition.  Patient tolerated procedure well without a complication.        Postoperative Plan:    Transfer to floor  Dressing-maintain dressing for now, okay to reinforce as needed saturation  Weight Bear/Lifting Status-as tolerated  DVT PPx-Lovenox 40 mg once daily for 14 days  Follow up-2 weeks in the office    Electronically Signed by: Brijesh Burton MD

## 2024-02-24 NOTE — THERAPY EVALUATION
Acute Care - Physical Therapy Initial Evaluation   Roger     Patient Name: Breanne Dickson  : 1955  MRN: [id number]  Today's Date: 2024   Onset of Illness/Injury or Date of Surgery: 24  Visit Dx:     ICD-10-CM ICD-9-CM   1. Closed nondisplaced intertrochanteric fracture of right femur, initial encounter  S72.144A 820.21   2. Closed nondisplaced fracture of greater trochanter of right femur, initial encounter  S72.114A 820.20   3. Acute on chronic respiratory failure with hypoxia  J96.21 518.84     799.02   4. Hypervolemia, unspecified hypervolemia type  E87.70 276.69   5. COPD with acute exacerbation  J44.1 491.21   6. Sepsis, due to unspecified organism, unspecified whether acute organ dysfunction present  A41.9 038.9     995.91     Patient Active Problem List   Diagnosis    Intertrochanteric fracture of right femur     Past Medical History:   Diagnosis Date    Arthritis     Diabetes mellitus     Hyperlipidemia     Hypertension     Multiple rib fractures      History reviewed. No pertinent surgical history.  PT Assessment (last 12 hours)       PT Evaluation and Treatment       Row Name 24 1400          Physical Therapy Time and Intention    Document Type evaluation  -     Mode of Treatment physical therapy  -     Patient Effort fair  -     Symptoms Noted During/After Treatment other (see comments)  BP low. RN cleared PT evaluation with monitoring of BP.  BP took supine at 90s/50s.  Pt unable to participate with edge of bed or further mobility due to agitation and combativeness  -       Row Name 24 1400          General Information    Patient Profile Reviewed yes  -     Onset of Illness/Injury or Date of Surgery 24  -     Referring Physician Micheal  -     Patient Observations alert;poorly cooperative  -     Patient/Family/Caregiver Comments/Observations Family was present in room during PT evaluation.  -     Prior Level of Function independent:;all household  mobility;gait;transfer;bed mobility  -     Equipment Currently Used at Home none  -     Existing Precautions/Restrictions fall  -KP     Limitations/Impairments safety/cognitive  -     Barriers to Rehab cognitive status;uncooperative;combative  -       Row Name 02/24/24 1400          Previous Level of Function/Home Environm    Bed Mobility, Premorbid Functional Level independent  -KP     Transfers, Premorbid Functional Level independent  -KP     Household Ambulation, Premorbid Functional Level independent  -     Stairs, Premorbid Functional Level independent  -KP     Community Ambulation, Premorbid Functional Level independent  -KP     Previous Level of Function, Premorbid Pt was independent without use of AD prior to fracture  -       Row Name 02/24/24 1400          Living Environment    Current Living Arrangements home  -     People in Home other (see comments);alone  Lives on daughter's property  -       Row Name 02/24/24 1400          Home Use of Assistive/Adaptive Equipment    Equipment Currently Used at Home none  -       Row Name 02/24/24 1400          Pain    Pretreatment Pain Rating 0/10 - no pain  -KP     Posttreatment Pain Rating 0/10 - no pain  -KP     Pain Location - Side/Orientation Right  -     Pre/Posttreatment Pain Comment Pt reported no pain in right hip at rest or with movement.  -       Row Name 02/24/24 1400          Cognition    Affect/Mental Status (Cognition) agitated;confused  -     Behavioral Issues (Cognition) overwhelmed easily;uncooperative;verbal outbursts;combative/physical outbursts;other (see comments)  With assessment of LE patient quickly became restless and then agitated, wanting to get up and leave.  -     Orientation Status (Cognition) disoriented to;place;situation  -KP     Follows Commands (Cognition) follows one-step commands;physical/tactile prompts required;repetition of directions required;verbal cues/prompting required  -KP     Cognitive Function  safety deficit;memory deficit  -       Row Name 02/24/24 1400          Range of Motion (ROM)    Range of Motion ROM is WFL  -       Row Name 02/24/24 1400          Strength Comprehensive (MMT)    Comment, General Manual Muscle Testing (MMT) Assessment bilateral LE strength 3-/5  -       Row Name 02/24/24 1400          Bed Mobility    Comment, (Bed Mobility) Unable to be assessed due to patient quickly becoming agitated and combative while in bed.  Nurses had to come assist with patient.  -       Row Name 02/24/24 1400          Transfers    Comment, (Transfers) Unable to be assessed due to patient quickly becoming agitated and combative while in bed.  Nurses had to come assist with the patient.  -       Row Name 02/24/24 1400          Gait/Stairs (Locomotion)    Patient was able to Ambulate no, other medical factors prevent ambulation  -     Reason Patient was unable to Ambulate Other (Comment)  Combative and physical outbursts while in bed.  Nurses had to come assist with patient.  -       Row Name 02/24/24 1400          Safety Issues, Functional Mobility    Safety Issues Affecting Function (Mobility) awareness of need for assistance;at risk behavior observed;insight into deficits/self-awareness;judgment;safety precaution awareness  -     Impairments Affecting Function (Mobility) balance;cognition;strength  -       Row Name             Wound 02/24/24 1033 Right anterior thigh    Wound - Properties Group Placement Date: 02/24/24  -JG Placement Time: 1033  -JG Side: Right  -JG Orientation: anterior  -JG Location: thigh  -JG    Retired Wound - Properties Group Placement Date: 02/24/24  -JG Placement Time: 1033  -JG Side: Right  -JG Orientation: anterior  -JG Location: thigh  -JG    Retired Wound - Properties Group Date first assessed: 02/24/24  -JG Time first assessed: 1033  -JG Side: Right  -JG Location: thigh  -JG      Row Name 02/24/24 1400          Plan of Care Review    Plan of Care Reviewed With  patient  -     Outcome Evaluation PT evaluation completed but transfers and gait unable to be performed today due to patient quickly becoming combative and agitated while in bed, stating she wants to leave.  Nurses had to come assist with the patient.  Patient will benefit from further skilled physical therapy treatment to improve strength and overall functional mobility following right hip nailing in order to progress towards PLOF.  -       Row Name 02/24/24 1400          Positioning and Restraints    Pre-Treatment Position in bed  -     Post Treatment Position bed  -KP     In Bed notified nsg;supine;with family/caregiver;with nsg;side rails up x3  Patient with nursing staff due to combativeness  -       Row Name 02/24/24 1400          Therapy Assessment/Plan (PT)    Patient/Family Therapy Goals Statement (PT) Patient wants to go home  -     PT Diagnosis (PT) right hip nailing  -     Rehab Potential (PT) fair, will monitor progress closely  -     Criteria for Skilled Interventions Met (PT) yes;meets criteria;skilled treatment is necessary  -     Therapy Frequency (PT) 6 times/wk  -     Predicted Duration of Therapy Intervention (PT) 2 wks  -     Problem List (PT) problems related to;balance;cognition;mobility;strength  -     Activity Limitations Related to Problem List (PT) unable to transfer safely;unable to ambulate safely  -       Row Name 02/24/24 1400          PT Evaluation Complexity    History, PT Evaluation Complexity 1-2 personal factors and/or comorbidities  -     Examination of Body Systems (PT Eval Complexity) total of 3 or more elements  -     Clinical Presentation (PT Evaluation Complexity) evolving  -     Clinical Decision Making (PT Evaluation Complexity) moderate complexity  -KP     Overall Complexity (PT Evaluation Complexity) moderate complexity  -       Row Name 02/24/24 1400          Physical Therapy Goals    Bed Mobility Goal Selection (PT) bed mobility, PT goal  1  -KP     Transfer Goal Selection (PT) transfer, PT goal 1  -KP     Gait Training Goal Selection (PT) gait training, PT goal 1  -       Row Name 02/24/24 1400          Bed Mobility Goal 1 (PT)    Activity/Assistive Device (Bed Mobility Goal 1, PT) sit to supine/supine to sit  -KP     [place] Level/Cues Needed (Bed Mobility Goal 1, PT) standby assist  -KP     Time Frame (Bed Mobility Goal 1, PT) long term goal (LTG);by discharge  -       Row Name 02/24/24 1400          Transfer Goal 1 (PT)    Activity/Assistive Device (Transfer Goal 1, PT) transfers, all;sit-to-stand/stand-to-sit;walker, rolling  -KP     [place] Level/Cues Needed (Transfer Goal 1, PT) contact guard required  -KP     Time Frame (Transfer Goal 1, PT) long term goal (LTG);by discharge  -       Row Name 02/24/24 1400          Gait Training Goal 1 (PT)    Activity/Assistive Device (Gait Training Goal 1, PT) gait (walking locomotion);assistive device use;walker, rolling  -KP     [place] Level (Gait Training Goal 1, PT) contact guard required  -KP     Distance (Gait Training Goal 1, PT) 50ft  -KP     Time Frame (Gait Training Goal 1, PT) long term goal (LTG);by discharge  -               User Key  (r) = Recorded By, (t) = Taken By, (c) = Cosigned By      Initials Name Provider Type    Lourdes Braun, RN Registered Nurse    Bessie Lyman, PT Physical Therapist                      PT Recommendation and Plan  Planned Therapy Interventions (PT): balance training, bed mobility training, gait training, home exercise program, motor coordination training, neuromuscular re-education, patient/family education, postural re-education, strengthening, stair training, transfer training  Therapy Frequency (PT): 6 times/wk  Plan of Care Reviewed With: patient  Outcome Evaluation: PT evaluation completed but transfers and gait unable to be performed today due to patient quickly becoming combative and agitated while in bed, stating she wants  to leave.  Nurses had to come assist with the patient.  Patient will benefit from further skilled physical therapy treatment to improve strength and overall functional mobility following right hip nailing in order to progress towards PLOF.       Time Calculation:    PT Charges       Row Name 02/24/24 1515             Time Calculation    PT Received On 02/24/24  -      PT Goal Re-Cert Due Date 03/09/24  -                User Key  (r) = Recorded By, (t) = Taken By, (c) = Cosigned By      Initials Name Provider Type     Bessie Sr, [place] Physical Therapist                  Therapy Charges for Today       Code Description Service Date Service Provider Modifiers Qty    95068586261 HC PT EVAL MOD COMPLEXITY 2 2/24/2024 Bessie Sr, PT GP 1            PT G-Codes  AM-PAC 6 Clicks Score (PT): 18    Bessie Sr PT  2/24/2024

## 2024-02-25 LAB
ANION GAP SERPL CALCULATED.3IONS-SCNC: 13.5 MMOL/L (ref 5–15)
BASOPHILS # BLD AUTO: 0.06 10*3/MM3 (ref 0–0.2)
BASOPHILS NFR BLD AUTO: 0.5 % (ref 0–1.5)
BUN SERPL-MCNC: 15 MG/DL (ref 8–23)
BUN/CREAT SERPL: 18.3 (ref 7–25)
CALCIUM SPEC-SCNC: 9.2 MG/DL (ref 8.6–10.5)
CHLORIDE SERPL-SCNC: 104 MMOL/L (ref 98–107)
CO2 SERPL-SCNC: 22.5 MMOL/L (ref 22–29)
CREAT SERPL-MCNC: 0.82 MG/DL (ref 0.57–1)
DEPRECATED RDW RBC AUTO: 60.1 FL (ref 37–54)
EGFRCR SERPLBLD CKD-EPI 2021: 78.5 ML/MIN/1.73
EOSINOPHIL # BLD AUTO: 0.13 10*3/MM3 (ref 0–0.4)
EOSINOPHIL NFR BLD AUTO: 1.1 % (ref 0.3–6.2)
ERYTHROCYTE [DISTWIDTH] IN BLOOD BY AUTOMATED COUNT: 17.9 % (ref 12.3–15.4)
GLUCOSE BLDC GLUCOMTR-MCNC: 108 MG/DL (ref 70–130)
GLUCOSE BLDC GLUCOMTR-MCNC: 127 MG/DL (ref 70–130)
GLUCOSE BLDC GLUCOMTR-MCNC: 130 MG/DL (ref 70–130)
GLUCOSE BLDC GLUCOMTR-MCNC: 141 MG/DL (ref 70–130)
GLUCOSE SERPL-MCNC: 146 MG/DL (ref 65–99)
HCT VFR BLD AUTO: 41.1 % (ref 34–46.6)
HGB BLD-MCNC: 12.9 G/DL (ref 12–15.9)
IMM GRANULOCYTES # BLD AUTO: 0.05 10*3/MM3 (ref 0–0.05)
IMM GRANULOCYTES NFR BLD AUTO: 0.4 % (ref 0–0.5)
LYMPHOCYTES # BLD AUTO: 1.64 10*3/MM3 (ref 0.7–3.1)
LYMPHOCYTES NFR BLD AUTO: 14.2 % (ref 19.6–45.3)
MCH RBC QN AUTO: 28.9 PG (ref 26.6–33)
MCHC RBC AUTO-ENTMCNC: 31.4 G/DL (ref 31.5–35.7)
MCV RBC AUTO: 92.2 FL (ref 79–97)
MONOCYTES # BLD AUTO: 0.98 10*3/MM3 (ref 0.1–0.9)
MONOCYTES NFR BLD AUTO: 8.5 % (ref 5–12)
NEUTROPHILS NFR BLD AUTO: 75.3 % (ref 42.7–76)
NEUTROPHILS NFR BLD AUTO: 8.69 10*3/MM3 (ref 1.7–7)
NRBC BLD AUTO-RTO: 0 /100 WBC (ref 0–0.2)
PLATELET # BLD AUTO: 327 10*3/MM3 (ref 140–450)
PMV BLD AUTO: 10 FL (ref 6–12)
POTASSIUM SERPL-SCNC: 4.5 MMOL/L (ref 3.5–5.2)
QT INTERVAL: 390 MS
QTC INTERVAL: 512 MS
RBC # BLD AUTO: 4.46 10*6/MM3 (ref 3.77–5.28)
SODIUM SERPL-SCNC: 140 MMOL/L (ref 136–145)
WBC NRBC COR # BLD AUTO: 11.55 10*3/MM3 (ref 3.4–10.8)

## 2024-02-25 PROCEDURE — 85025 COMPLETE CBC W/AUTO DIFF WBC: CPT | Performed by: GENERAL PRACTICE

## 2024-02-25 PROCEDURE — 94799 UNLISTED PULMONARY SVC/PX: CPT

## 2024-02-25 PROCEDURE — 80048 BASIC METABOLIC PNL TOTAL CA: CPT | Performed by: GENERAL PRACTICE

## 2024-02-25 PROCEDURE — 99232 SBSQ HOSP IP/OBS MODERATE 35: CPT | Performed by: STUDENT IN AN ORGANIZED HEALTH CARE EDUCATION/TRAINING PROGRAM

## 2024-02-25 PROCEDURE — 94761 N-INVAS EAR/PLS OXIMETRY MLT: CPT

## 2024-02-25 PROCEDURE — 25010000002 CEFAZOLIN PER 500 MG: Performed by: GENERAL PRACTICE

## 2024-02-25 PROCEDURE — 94664 DEMO&/EVAL PT USE INHALER: CPT

## 2024-02-25 PROCEDURE — 82948 REAGENT STRIP/BLOOD GLUCOSE: CPT

## 2024-02-25 PROCEDURE — 25010000002 ZIPRASIDONE MESYLATE PER 10 MG: Performed by: STUDENT IN AN ORGANIZED HEALTH CARE EDUCATION/TRAINING PROGRAM

## 2024-02-25 RX ORDER — IPRATROPIUM BROMIDE AND ALBUTEROL SULFATE 2.5; .5 MG/3ML; MG/3ML
3 SOLUTION RESPIRATORY (INHALATION) EVERY 6 HOURS PRN
Status: DISCONTINUED | OUTPATIENT
Start: 2024-02-25 | End: 2024-02-28 | Stop reason: HOSPADM

## 2024-02-25 RX ADMIN — OLANZAPINE 10 MG: 10 TABLET, ORALLY DISINTEGRATING ORAL at 20:39

## 2024-02-25 RX ADMIN — Medication 10 ML: at 20:41

## 2024-02-25 RX ADMIN — IPRATROPIUM BROMIDE AND ALBUTEROL SULFATE 3 ML: 2.5; .5 SOLUTION RESPIRATORY (INHALATION) at 07:25

## 2024-02-25 RX ADMIN — OFLOXACIN 50000 UNITS: 300 TABLET, COATED ORAL at 08:22

## 2024-02-25 RX ADMIN — ZIPRASIDONE MESYLATE 20 MG: 20 INJECTION, POWDER, LYOPHILIZED, FOR SOLUTION INTRAMUSCULAR at 19:17

## 2024-02-25 RX ADMIN — AMITRIPTYLINE HYDROCHLORIDE 100 MG: 50 TABLET, FILM COATED ORAL at 20:39

## 2024-02-25 RX ADMIN — Medication 10 ML: at 08:24

## 2024-02-25 RX ADMIN — CEFAZOLIN 1000 MG: 1 INJECTION, POWDER, FOR SOLUTION INTRAMUSCULAR; INTRAVENOUS; PARENTERAL at 05:01

## 2024-02-25 RX ADMIN — BUDESONIDE AND FORMOTEROL FUMARATE DIHYDRATE 2 PUFF: 160; 4.5 AEROSOL RESPIRATORY (INHALATION) at 07:25

## 2024-02-25 RX ADMIN — HYDROCODONE BITARTRATE AND ACETAMINOPHEN 1 TABLET: 7.5; 325 TABLET ORAL at 16:42

## 2024-02-25 RX ADMIN — NICOTINE 1 PATCH: 14 PATCH, EXTENDED RELEASE TRANSDERMAL at 08:20

## 2024-02-25 RX ADMIN — ALLOPURINOL 100 MG: 100 TABLET ORAL at 08:21

## 2024-02-25 RX ADMIN — HYDROCODONE BITARTRATE AND ACETAMINOPHEN 1 TABLET: 7.5; 325 TABLET ORAL at 08:45

## 2024-02-25 RX ADMIN — ASPIRIN 81 MG: 81 TABLET, COATED ORAL at 08:21

## 2024-02-25 RX ADMIN — FLUOXETINE HYDROCHLORIDE 40 MG: 20 CAPSULE ORAL at 08:21

## 2024-02-25 RX ADMIN — ROSUVASTATIN CALCIUM 20 MG: 20 TABLET, FILM COATED ORAL at 20:39

## 2024-02-25 RX ADMIN — CETIRIZINE HYDROCHLORIDE 10 MG: 10 TABLET, FILM COATED ORAL at 08:21

## 2024-02-25 RX ADMIN — PANTOPRAZOLE SODIUM 40 MG: 40 TABLET, DELAYED RELEASE ORAL at 05:01

## 2024-02-25 RX ADMIN — MONTELUKAST SODIUM 10 MG: 10 TABLET, COATED ORAL at 20:39

## 2024-02-25 RX ADMIN — OXYBUTYNIN CHLORIDE 10 MG: 5 TABLET, EXTENDED RELEASE ORAL at 08:21

## 2024-02-25 NOTE — PROGRESS NOTES
Orthopedic Surgery Progress Note    Interval History:     No acute events overnight.     Patient is confused this morning.  The daughter that is in the room reports that she had a confusing episode yesterday which they contributed to anesthesia and narcotic pain medicine.  As this she has not participated with physical therapy yet or ambulated yet.  Per the daughter the patient is in only mild pain and was able to rest throughout the night.    Vital Signs  Temp:  [96.7 °F (35.9 °C)-99.5 °F (37.5 °C)] 97.8 °F (36.6 °C)  Heart Rate:  [] 98  Resp:  [16-27] 16  BP: ()/(48-78) 107/70    Physical Exam:  General:  Alert.  No acute distress.      Dressing:  C/D/I  Neurological: RLE SILT s/s/dp/sp/t, +DF/PF/EHL  Vascular: RLE +2 dp/pt  Limb lengths appear equal and symmetric     Labs:    Lab Results   Component Value Date    WBC 11.93 (H) 02/24/2024    HGB 13.0 02/24/2024    HCT 41.0 02/24/2024    MCV 90.1 02/24/2024     02/24/2024     Lab Results   Component Value Date    GLUCOSE 127 (H) 02/24/2024    CALCIUM 9.0 02/24/2024     02/24/2024    K 3.9 02/24/2024    CO2 22.8 02/24/2024     02/24/2024    BUN 27 (H) 02/24/2024    CREATININE 0.94 02/24/2024    BCR 28.7 (H) 02/24/2024    ANIONGAP 11.2 02/24/2024       Images:      None.    Assessment    POD 1 s/p right intertrochanteric hip fracture repair with short cephalomedullary nail    Plan    Continue to encourage frequent ambulation as tolerated as appropriate    Pain Control  PT/OT - postoperative mobilization  Weightbearing/Lifting Status-as tolerated   Dressing - change prn saturation only, if need to change - 4x4s/ABDs and tape only, no Betadine or wound cleansing needed  DVT Ppx- SCD's and Lovenox 40 mg subcutaneous once daily x 14 days  Follow up- 14 days in the office  Disposition-TBD    Ortho will continue to follow        Brijesh Burton MD  02/25/24  06:53 EST

## 2024-02-25 NOTE — PROGRESS NOTES
[facility] HOSPITALIST PROGRESS NOTE     Patient Identification:  Name:  Breanne Dickson  Age:  68 y.o.  Sex:  female  :  1955  MRN:  [id number]  Visit Number:  [id number]  ROOM: [address] [place] [address]     Primary Care Provider:  Mery Coulter DO    Length of stay in inpatient status:  3    Subjective     Chief Compliant:    Chief Complaint   Patient presents with    Altered Mental Status       History of Presenting Illness:    Patient seen in follow-up for right hip fracture and gluteus muscle tear.  This morning she is awake, at mental baseline given dementia.  Family present at bedside.  Did well with increased Zyprexa last night and slept throughout the night without any issues.  No concerns or complaints by patient or family at bedside this morning.    Objective     Current Hospital Meds:allopurinol, 100 mg, Oral, Daily  amitriptyline, 100 mg, Oral, Nightly  aspirin, 81 mg, Oral, Daily  budesonide-formoterol, 2 puff, Inhalation, BID - RT  cetirizine, 10 mg, Oral, Daily  cholecalciferol, 50,000 Units, Oral, Weekly  enoxaparin, 40 mg, Subcutaneous, Q24H  FLUoxetine, 40 mg, Oral, Daily  insulin lispro, 2-7 Units, Subcutaneous, 4x Daily AC & at Bedtime  metoprolol tartrate, 12.5 mg, Oral, Q12H  montelukast, 10 mg, Oral, Nightly  nicotine, 1 patch, Transdermal, Q24H  OLANZapine zydis, 10 mg, Oral, Nightly  oxybutynin XL, 10 mg, Oral, Daily  pantoprazole, 40 mg, Oral, Q AM  rosuvastatin, 20 mg, Oral, Nightly  sodium chloride, 10 mL, Intravenous, Q12H         Current Antimicrobial Therapy:  Anti-Infectives (From admission, onward)      Ordered     Dose/Rate Route Frequency Start Stop    24 1147  ceFAZolin 1000 mg IVPB in 100 mL NS (VTB)        Ordering Provider: Brijesh Burton MD    1,000 mg  over 30 Minutes Intravenous Every 8 Hours 24 1300 24 0531    24 0823  ceFAZolin 2000 mg IVPB in 100 mL NS (VTB)        Ordering Provider: Brijesh Burton MD    2,000 mg  over 30 Minutes  Intravenous Once 02/24/24 0915 02/24/24 0922 02/22/24 1610  vancomycin 1250 mg/250 mL 0.9% NS IVPB ([facility])        Ordering Provider: Gabriela Savage PA-C    20 mg/kg × 59.4 kg  over 75 Minutes Intravenous Once 02/22/24 1700 02/22/24 2010 02/22/24 1610  cefepime 2000 mg IVPB in 100 mL NS (VTB)        Ordering Provider: Gabriela Savage PA-C    2,000 mg  over 30 Minutes Intravenous Once 02/22/24 1626 02/22/24 1731          Current Diuretic Therapy:  Diuretics (From admission, onward)      Ordered     Dose/Rate Route Frequency Start Stop    02/22/24 1635  furosemide (LASIX) injection 80 mg        Ordering Provider: Gabriela Savage PA-C    80 mg Intravenous Once 02/22/24 1651 02/22/24 1705          ----------------------------------------------------------------------------------------------------------------------  Vital Signs:  Temp:  [96.7 °F (35.9 °C)-99.5 °F (37.5 °C)] 97.8 °F (36.6 °C)  Heart Rate:  [] 107  Resp:  [16-20] 18  BP: ()/(56-78) 114/63  SpO2:  [95 %-99 %] 96 %  on  Flow (L/min):  [2] 2;   Device (Oxygen Therapy): nasal cannula  Body mass index is 26.05 kg/m².    Wt Readings from Last 3 Encounters:   02/22/24 58.5 kg (129 lb)   05/27/19 75.8 kg (167 lb)   12/23/17 79.4 kg (175 lb)     Intake & Output (last 3 days)         02/20 0701 02/21 0700 02/21 0701 02/22 0700 02/22 0701 02/23 0700 02/23 0701 02/24 0700    P.O.   0     IV Piggyback   750     Total Intake(mL/kg)   750 (12.8)     Net   +750             Urine Unmeasured Occurrence   1 x           Diet: Regular/House Diet; Texture: Regular Texture (IDDSI 7); Fluid Consistency: Thin (IDDSI 0)  ----------------------------------------------------------------------------------------------------------------------  Physical exam:  Constitutional: Elderly female, Well-developed and well-nourished, resting comfortably in bed, no acute distress.      HENT:  Head:  Normocephalic and atraumatic.  Mouth:  Moist mucous membranes.     Eyes:  Conjunctivae and EOM are normal. No scleral icterus.   Cardiovascular:  Normal rate, regular rhythm and normal heart sounds with no murmur. No JVD.   Pulmonary/Chest:  No respiratory distress, no wheezes, no crackles, with normal breath sounds and good air movement. Unlabored. No accessory muscle use.  Abdominal:  Soft. No distension and no tenderness.  Bowel sounds present. No rebound or guarding.   Musculoskeletal:  No tenderness, and no deformity.  No red or swollen joints anywhere.    Neurological:  Alert and oriented to person, place but not time.  No cranial nerve deficit.   Nonfocal.   Skin:  Skin is warm and dry. No rash noted. No pallor.   Peripheral vascular:  No clubbing, no cyanosis, no edema. Pedal and tibial pulses 2 out of 4 bilaterally.  Neurovascularly intact.    Unchanged from 2/24/2024  Electronically signed by Dashawn Alonso DO, 02/25/24, 12:26 PM EST.        ----------------------------------------------------------------------------------------------------------------------  Results from last 7 days   Lab Units 02/25/24  0843 02/24/24  0201 02/23/24  0201 02/22/24  1619 02/22/24  1518   CRP mg/dL  --   --   --   --  8.72*   LACTATE mmol/L  --   --   --  1.2  --    WBC 10*3/mm3 11.55* 11.93* 10.55  --  13.08*   HEMOGLOBIN g/dL 12.9 13.0 13.5  --  13.6   HEMATOCRIT % 41.1 41.0 41.5  --  42.1   MCV fL 92.2 90.1 88.5  --  89.2   MCHC g/dL 31.4* 31.7 32.5  --  32.3   PLATELETS 10*3/mm3 327 324 341  --  336   INR   --   --  1.03  --   --      Results from last 7 days   Lab Units 02/22/24  1420   PH, ARTERIAL pH units 7.435   PO2 ART mm Hg 53.6*   PCO2, ARTERIAL mm Hg 31.5*   HCO3 ART mmol/L 21.1     Results from last 7 days   Lab Units 02/25/24  0843 02/24/24  0201 02/23/24  0201 02/22/24  1518   SODIUM mmol/L 140 139 140 138   POTASSIUM mmol/L 4.5 3.9 3.8 4.0   CHLORIDE mmol/L 104 105 101 103   CO2 mmol/L 22.5 22.8 25.4 22.6   BUN mg/dL 15 27* 26* 27*   CREATININE mg/dL 0.82 0.94 0.96  "0.94   CALCIUM mg/dL 9.2 9.0 9.6 9.6   GLUCOSE mg/dL 146* 127* 155* 91   ALBUMIN g/dL  --   --  3.8 3.8   BILIRUBIN mg/dL  --   --  0.4 0.3   ALK PHOS U/L  --   --  84 88   AST (SGOT) U/L  --   --  18 21   ALT (SGPT) U/L  --   --  16 18   Estimated Creatinine Clearance: 61.5 mL/min (by C-G formula based on SCr of 0.82 mg/dL).  No results found for: \"AMMONIA\"  Results from last 7 days   Lab Units 02/22/24  1922 02/22/24  1518   HSTROP T ng/L 50* 53*     Results from last 7 days   Lab Units 02/22/24  1518   PROBNP pg/mL 22,342.0*         Glucose   Date/Time Value Ref Range Status   02/25/2024 1103 141 (H) 70 - 130 mg/dL Final   02/25/2024 0716 130 70 - 130 mg/dL Final   02/24/2024 2014 131 (H) 70 - 130 mg/dL Final   02/24/2024 1645 158 (H) 70 - 130 mg/dL Final   02/24/2024 1113 151 (H) 70 - 130 mg/dL Final   02/24/2024 0849 136 (H) 70 - 130 mg/dL Final   02/24/2024 0712 135 (H) 70 - 130 mg/dL Final   02/23/2024 1949 114 70 - 130 mg/dL Final     Lab Results   Component Value Date    TSH 0.915 02/22/2024     No results found for: \"PREGTESTUR\", \"PREGSERUM\", \"HCG\", \"HCGQUANT\"  Pain Management Panel           No data to display              Brief Urine Lab Results  (Last result in the past 365 days)        Color   Clarity   Blood   Leuk Est   Nitrite   Protein   CREAT   Urine HCG        02/22/24 1708 Yellow   Clear   Negative   Negative   Negative   Negative                 No results found for: \"BLOODCX\"  No results found for: \"URINECX\"  No results found for: \"WOUNDCX\"  No results found for: \"STOOLCX\"  No results found for: \"RESPCX\"  No results found for: \"AFBCX\"  Results from last 7 days   Lab Units 02/22/24  1619 02/22/24  1518   PROCALCITONIN ng/mL  --  0.14   LACTATE mmol/L 1.2  --    CRP mg/dL  --  8.72*       I have personally looked at the labs and they are summarized above.  ----------------------------------------------------------------------------------------------------------------------  Detailed radiology " reports for the last 24 hours:  Imaging Results (Last 24 Hours)       Procedure Component Value Units Date/Time    FL Surgery Fluoro [[id number]] Collected: 02/25/24 0659     Updated: 02/25/24 0703    Narrative:      Fluoro time     PROCEDURE: Fluoroscopy in the operating room.     HISTORY: rt hip nailling; S72.144A-Nondisplaced intertrochanteric  fracture of right femur, initial encounter for closed fracture;  S72.114A-Nondisplaced fracture of greater trochanter of right femur,  initial encounter for closed fracture; J96.21-Acute and chronic  respiratory failure with hypoxia; E87.70-Fluid overload, unspecified;  J44.1-Chronic obstructive pulmonary disease with (acute) exacerbation;  A41.     FINDINGS: Fluoroscopy time was provided by the radiology department for  the clinical service. 4 images were obtained and fluoroscopy time was  49.3 seconds. Images obtained demonstrate an intramedullary tony and  compression screw in place with no hardware complications.       Impression:      Please see the operative report.                 This report was finalized on 2/25/2024 7:00 AM by Kayla Canales M.D..             Assessment & Plan      Patient is a 68-year-old female with history significant for type 2 diabetes mellitus, hypertension hyperlipidemia who presented to the ER with complaints of right hip pain and intermittent issues ambulating per family at bedside.    #Acute intertrochanteric fracture of the right femur  #Status post mechanical fall  #Right gluteus media muscle tear  --Patient presented to the ER with initial complaints of right hip pain and having difficulty ambulating, getting up from the toilet after several recent falls.  Although initial CT in the ER noted right hip fracture, there was questions whether this was operable or nonsurgical management.  I witnessed patient ambulating throughout the hallway from her room to the bathroom unassisted and was doing quite well prior to MRI  results.  --Etiology of frequent falls at home is likely due to hypoxia which is undoubtably chronic given patient's significant structural lung disease  --MRI noted right intertrochanteric hip fracture and gluteus media muscle tear  --Neurovascularly intact  --Status post cephalomedullary nail 2/24  --As needed pain meds, antiemetics if needed  --PT/OT postop, family requested IPR, consult placed  --Ortho following, appreciate recommendations    #Chronic hypoxic respiratory failure due to fibrosis  #COPD, not in acute exacerbation  --Patient was not on O2 at baseline prior to arrival however given SpO2 in the low 90s, ABG noted a PaO2 of 53.  Given the structural parenchymal changes on imaging with fibrosis, highly suspect this is chronic and contributing to the above fracture  --As needed nebulizers, Symbicort  --Plan to follow-up outpatient in the COPD clinic    #NSTEMI, likely type II  #Severe pulmonary hypertension  --Denies any chest pain/pressure or tightness, hypoxia likely the culprit  --Initial troponin 53, repeat 50 with a delta of -3  --proBNP 22 K  --Echocardiogram noted EF 46 to 50%, diastolic dysfunction, severe pulmonary hypertension, RVSP 60  --Continue aspirin, high intensity statin, beta-blocker    #Dementia without behavioral disturbances  --Patient had an episode of confusion, agitation/aggression postop felt to be related to anesthesia and morphine.  A brief hold was ordered.  Patient received 1 dose of Geodon last night and Zyprexa increased.  She slept well last night and looks back to baseline this morning.   --Continue new Zyprexa at bedtime    #Acute urinary retention  --Status post anchored M[place]en  --Oxybutynin daily  --Give voiding trial in 1 to 2 days postop    #Type 2 diabetes mellitus, hold metformin, add SSI if needed  #Essential hypertension, normotensive, hold meds  #Hyperlipidemia, high intensity statin  #Current smoker, nicotine patch  #Reactive leukocytosis    CHECKLIST:  Abx:  None  VTE: Lovenox  GI ppx: None  Diet: Consistent carb  Code: CPR, full  Dispo: Patient is medically stable, PT/OT.  Family has requested inpatient rehab ultimate disposition plans to return home with family.    Dashawn Alonso DO  [facility] Hospitalist  02/25/24  12:24 EST

## 2024-02-25 NOTE — PLAN OF CARE
Goal Outcome Evaluation:              Outcome Evaluation: Pt rested in bed this shift with daughter at bedside. Pt was restless and gave PRN geodon. Pt was still restless and began to swing at daughter and upset her. Dr Whitmore notified and meds changed. PRN pain meds given. IV abx given as scheduled. No other concerns or complaints, VSS and will continue with POC.

## 2024-02-25 NOTE — PLAN OF CARE
Goal Outcome Evaluation:  Plan of Care Reviewed With: patient           Outcome Evaluation: Patient rested in bed during shift. Family member at bedside. Patient ambulated and sat in chair during shift. PRN pain medication given per order. Patient has no complaints or concerns during shift. Will continue with plan  of care.

## 2024-02-26 LAB
ANION GAP SERPL CALCULATED.3IONS-SCNC: 11.3 MMOL/L (ref 5–15)
BASOPHILS # BLD AUTO: 0.05 10*3/MM3 (ref 0–0.2)
BASOPHILS NFR BLD AUTO: 0.4 % (ref 0–1.5)
BUN SERPL-MCNC: 14 MG/DL (ref 8–23)
BUN/CREAT SERPL: 15.1 (ref 7–25)
CALCIUM SPEC-SCNC: 8.8 MG/DL (ref 8.6–10.5)
CHLORIDE SERPL-SCNC: 104 MMOL/L (ref 98–107)
CO2 SERPL-SCNC: 23.7 MMOL/L (ref 22–29)
CREAT SERPL-MCNC: 0.93 MG/DL (ref 0.57–1)
DEPRECATED RDW RBC AUTO: 59 FL (ref 37–54)
EGFRCR SERPLBLD CKD-EPI 2021: 67.5 ML/MIN/1.73
EOSINOPHIL # BLD AUTO: 0.51 10*3/MM3 (ref 0–0.4)
EOSINOPHIL NFR BLD AUTO: 4.1 % (ref 0.3–6.2)
ERYTHROCYTE [DISTWIDTH] IN BLOOD BY AUTOMATED COUNT: 17.5 % (ref 12.3–15.4)
GLUCOSE BLDC GLUCOMTR-MCNC: 125 MG/DL (ref 70–130)
GLUCOSE BLDC GLUCOMTR-MCNC: 127 MG/DL (ref 70–130)
GLUCOSE BLDC GLUCOMTR-MCNC: 160 MG/DL (ref 70–130)
GLUCOSE BLDC GLUCOMTR-MCNC: 197 MG/DL (ref 70–130)
GLUCOSE SERPL-MCNC: 133 MG/DL (ref 65–99)
HCT VFR BLD AUTO: 36 % (ref 34–46.6)
HGB BLD-MCNC: 11.5 G/DL (ref 12–15.9)
IMM GRANULOCYTES # BLD AUTO: 0.04 10*3/MM3 (ref 0–0.05)
IMM GRANULOCYTES NFR BLD AUTO: 0.3 % (ref 0–0.5)
LYMPHOCYTES # BLD AUTO: 2.85 10*3/MM3 (ref 0.7–3.1)
LYMPHOCYTES NFR BLD AUTO: 22.6 % (ref 19.6–45.3)
MCH RBC QN AUTO: 29.3 PG (ref 26.6–33)
MCHC RBC AUTO-ENTMCNC: 31.9 G/DL (ref 31.5–35.7)
MCV RBC AUTO: 91.6 FL (ref 79–97)
MONOCYTES # BLD AUTO: 1.12 10*3/MM3 (ref 0.1–0.9)
MONOCYTES NFR BLD AUTO: 8.9 % (ref 5–12)
NEUTROPHILS NFR BLD AUTO: 63.7 % (ref 42.7–76)
NEUTROPHILS NFR BLD AUTO: 8.02 10*3/MM3 (ref 1.7–7)
NRBC BLD AUTO-RTO: 0 /100 WBC (ref 0–0.2)
PLATELET # BLD AUTO: 280 10*3/MM3 (ref 140–450)
PMV BLD AUTO: 9.2 FL (ref 6–12)
POTASSIUM SERPL-SCNC: 4 MMOL/L (ref 3.5–5.2)
RBC # BLD AUTO: 3.93 10*6/MM3 (ref 3.77–5.28)
SODIUM SERPL-SCNC: 139 MMOL/L (ref 136–145)
WBC NRBC COR # BLD AUTO: 12.59 10*3/MM3 (ref 3.4–10.8)

## 2024-02-26 PROCEDURE — 63710000001 INSULIN LISPRO (HUMAN) PER 5 UNITS: Performed by: GENERAL PRACTICE

## 2024-02-26 PROCEDURE — 94799 UNLISTED PULMONARY SVC/PX: CPT

## 2024-02-26 PROCEDURE — 97530 THERAPEUTIC ACTIVITIES: CPT

## 2024-02-26 PROCEDURE — 97110 THERAPEUTIC EXERCISES: CPT

## 2024-02-26 PROCEDURE — 97116 GAIT TRAINING THERAPY: CPT

## 2024-02-26 PROCEDURE — 94664 DEMO&/EVAL PT USE INHALER: CPT

## 2024-02-26 PROCEDURE — 25010000002 ENOXAPARIN PER 10 MG: Performed by: GENERAL PRACTICE

## 2024-02-26 PROCEDURE — 80048 BASIC METABOLIC PNL TOTAL CA: CPT | Performed by: GENERAL PRACTICE

## 2024-02-26 PROCEDURE — 99232 SBSQ HOSP IP/OBS MODERATE 35: CPT | Performed by: INTERNAL MEDICINE

## 2024-02-26 PROCEDURE — 82948 REAGENT STRIP/BLOOD GLUCOSE: CPT

## 2024-02-26 PROCEDURE — 85025 COMPLETE CBC W/AUTO DIFF WBC: CPT | Performed by: GENERAL PRACTICE

## 2024-02-26 PROCEDURE — 94761 N-INVAS EAR/PLS OXIMETRY MLT: CPT

## 2024-02-26 RX ORDER — POLYETHYLENE GLYCOL 3350 17 G/17G
17 POWDER, FOR SOLUTION ORAL DAILY
Status: DISCONTINUED | OUTPATIENT
Start: 2024-02-26 | End: 2024-02-28 | Stop reason: HOSPADM

## 2024-02-26 RX ORDER — DOCUSATE SODIUM 100 MG/1
100 CAPSULE, LIQUID FILLED ORAL 2 TIMES DAILY
Status: DISCONTINUED | OUTPATIENT
Start: 2024-02-26 | End: 2024-02-28 | Stop reason: HOSPADM

## 2024-02-26 RX ORDER — LACTULOSE 10 G/15ML
30 SOLUTION ORAL ONCE
Qty: 60 ML | Refills: 0 | Status: COMPLETED | OUTPATIENT
Start: 2024-02-26 | End: 2024-02-26

## 2024-02-26 RX ADMIN — ALLOPURINOL 100 MG: 100 TABLET ORAL at 08:31

## 2024-02-26 RX ADMIN — DOCUSATE SODIUM 100 MG: 100 CAPSULE, LIQUID FILLED ORAL at 11:18

## 2024-02-26 RX ADMIN — IPRATROPIUM BROMIDE AND ALBUTEROL SULFATE 3 ML: 2.5; .5 SOLUTION RESPIRATORY (INHALATION) at 06:58

## 2024-02-26 RX ADMIN — OLANZAPINE 10 MG: 10 TABLET, ORALLY DISINTEGRATING ORAL at 21:23

## 2024-02-26 RX ADMIN — POLYETHYLENE GLYCOL (3350) 17 G: 17 POWDER, FOR SOLUTION ORAL at 11:18

## 2024-02-26 RX ADMIN — HYDROCODONE BITARTRATE AND ACETAMINOPHEN 1 TABLET: 7.5; 325 TABLET ORAL at 03:42

## 2024-02-26 RX ADMIN — INSULIN LISPRO 2 UNITS: 100 INJECTION, SOLUTION INTRAVENOUS; SUBCUTANEOUS at 11:54

## 2024-02-26 RX ADMIN — BUDESONIDE AND FORMOTEROL FUMARATE DIHYDRATE 2 PUFF: 160; 4.5 AEROSOL RESPIRATORY (INHALATION) at 19:45

## 2024-02-26 RX ADMIN — Medication 10 ML: at 08:31

## 2024-02-26 RX ADMIN — ASPIRIN 81 MG: 81 TABLET, COATED ORAL at 08:30

## 2024-02-26 RX ADMIN — INSULIN LISPRO 2 UNITS: 100 INJECTION, SOLUTION INTRAVENOUS; SUBCUTANEOUS at 17:59

## 2024-02-26 RX ADMIN — OXYBUTYNIN CHLORIDE 10 MG: 5 TABLET, EXTENDED RELEASE ORAL at 08:31

## 2024-02-26 RX ADMIN — FLUOXETINE HYDROCHLORIDE 40 MG: 20 CAPSULE ORAL at 08:30

## 2024-02-26 RX ADMIN — BUDESONIDE AND FORMOTEROL FUMARATE DIHYDRATE 2 PUFF: 160; 4.5 AEROSOL RESPIRATORY (INHALATION) at 06:58

## 2024-02-26 RX ADMIN — HYDROCODONE BITARTRATE AND ACETAMINOPHEN 1 TABLET: 7.5; 325 TABLET ORAL at 14:17

## 2024-02-26 RX ADMIN — ENOXAPARIN SODIUM 40 MG: 40 INJECTION SUBCUTANEOUS at 00:13

## 2024-02-26 RX ADMIN — LACTULOSE 30 G: 20 SOLUTION ORAL at 11:18

## 2024-02-26 RX ADMIN — AMITRIPTYLINE HYDROCHLORIDE 100 MG: 50 TABLET, FILM COATED ORAL at 21:22

## 2024-02-26 RX ADMIN — Medication 10 ML: at 21:25

## 2024-02-26 RX ADMIN — CETIRIZINE HYDROCHLORIDE 10 MG: 10 TABLET, FILM COATED ORAL at 08:31

## 2024-02-26 RX ADMIN — MONTELUKAST SODIUM 10 MG: 10 TABLET, COATED ORAL at 21:22

## 2024-02-26 RX ADMIN — NICOTINE 1 PATCH: 14 PATCH, EXTENDED RELEASE TRANSDERMAL at 08:32

## 2024-02-26 RX ADMIN — ROSUVASTATIN CALCIUM 20 MG: 20 TABLET, FILM COATED ORAL at 21:22

## 2024-02-26 NOTE — PLAN OF CARE
Goal Outcome Evaluation:              Outcome Evaluation: Pt rested in bed this shift with daugter at bedside. PRN Geodon given for agitation. IV abx given as scheduled. No new concerns or complaints, VSS and will continue with POC.

## 2024-02-26 NOTE — PROGRESS NOTES
Orthopedic Surgery Progress Note    Interval History:     No acute events overnight.     Patient seen and examined at bedside.  Patient is pleasantly confused.  Patient granddaughter is at bedside and reports that she had a better day yesterday with pain control and and confusion is been improving.  Patient's granddaughter reports that the patient has been up ambulating but minimally at this time.  No other current complaints.    Vital Signs  Temp:  [97.4 °F (36.3 °C)] 97.4 °F (36.3 °C)  Heart Rate:  [] 93  Resp:  [18] 18  BP: ()/(63-69) 107/64    Physical Exam:  General:  Alert. Oriented. No acute distress.      Dressing:  C/D/I  Neurological: RLE SILT s/s/dp/sp/t, +DF/PF/EHL  Vascular: RLE +2 dp/pt  Limb lengths appear equal and symmetric .     Labs:    Lab Results   Component Value Date    WBC 12.59 (H) 02/26/2024    HGB 11.5 (L) 02/26/2024    HCT 36.0 02/26/2024    MCV 91.6 02/26/2024     02/26/2024     Lab Results   Component Value Date    GLUCOSE 133 (H) 02/26/2024    CALCIUM 8.8 02/26/2024     02/26/2024    K 4.0 02/26/2024    CO2 23.7 02/26/2024     02/26/2024    BUN 14 02/26/2024    CREATININE 0.93 02/26/2024    BCR 15.1 02/26/2024    ANIONGAP 11.3 02/26/2024       Images:      None.    Assessment    POD 2 s/p right IT hip fracture repair with short cephalomedullary nail    Plan    Continue to encourage frequent ambulation as tolerated as appropriate    Pain Control  PT/OT - postoperative mobilization  Weightbearing/Lifting Status-as tolerated  Dressing - change prn saturation only, if need to change - 4x4s/ABDs and tape only, no Betadine or wound cleansing needed  DVT Ppx- SCD's and Lovenox 40 mg subcutaneous once daily x 14 days  Follow up- 14 days in the office  Disposition-TBD        Brijesh Burton MD  02/26/24  09:13 EST

## 2024-02-26 NOTE — PLAN OF CARE
Goal Outcome Evaluation:  Plan of Care Reviewed With: patient           Outcome Evaluation: Patient rested in bed during shift. Patient ambulated several times during shift. Patient worked with PT during shift. Patient given PRN pain medication during shift for pain. Will continue with plan of care.

## 2024-02-26 NOTE — PROGRESS NOTES
[facility] HOSPITALIST PROGRESS NOTE    Subjective     History:   Breanne Dickson is a 68 y.o. female admitted on 2/22/2024 secondary to Intertrochanteric fracture of right femur     Procedures:   2/24/24: Right intertrochanteric hip fracture repair with short cephalomedullary nail    CC: Follow up hip fracture     Patient seen and examined with EUGENE Moreira. Awake and alert but pleasantly confused. Family at bedside and supportive. No reported CP. No reported vomiting. No BM reported in several days. No acute events overnight per RN.     History taken from: patient's family, chart, and RN.      Objective     Vital Signs  Temp:  [94.3 °F (34.6 °C)-97.6 °F (36.4 °C)] 97.6 °F (36.4 °C)  Heart Rate:  [] 108  Resp:  [18] 18  BP: ()/(63-77) 114/77    Intake/Output Summary (Last 24 hours) at 2/26/2024 1535  Last data filed at 2/26/2024 1400  Gross per 24 hour   Intake 960 ml   Output 1000 ml   Net -40 ml         Physical Exam:  General:    Awake, alert but confused, in no acute distress, chronically ill appearing   Heart:      Normal S1 and S2. Regular rate and rhythm. No significant murmur, rubs or gallops appreciated.   Lungs:     Respirations regular, even and unlabored. Lungs clear to auscultation B/L. No wheezes, rales or rhonchi.   Abdomen:   Soft and nontender. No guarding, rebound tenderness or  organomegaly noted. Bowel sounds present x 4.   Extremities:  No clubbing, cyanosis or edema noted.      Results Review:    Results from last 7 days   Lab Units 02/26/24  0132 02/25/24  0843 02/24/24  0201 02/23/24  0201 02/22/24  1518   WBC 10*3/mm3 12.59* 11.55* 11.93* 10.55 13.08*   HEMOGLOBIN g/dL 11.5* 12.9 13.0 13.5 13.6   PLATELETS 10*3/mm3 280 327 324 341 336     Results from last 7 days   Lab Units 02/26/24  0132 02/25/24  0843 02/24/24  0201 02/23/24  0201 02/22/24  1518   SODIUM mmol/L 139 140 139 140 138   POTASSIUM mmol/L 4.0 4.5 3.9 3.8 4.0   CHLORIDE mmol/L 104 104 105 101 103   CO2  mmol/L 23.7 22.5 22.8 25.4 22.6   BUN mg/dL 14 15 27* 26* 27*   CREATININE mg/dL 0.93 0.82 0.94 0.96 0.94   CALCIUM mg/dL 8.8 9.2 9.0 9.6 9.6   GLUCOSE mg/dL 133* 146* 127* 155* 91     Results from last 7 days   Lab Units 02/23/24  0201 02/22/24  1518   BILIRUBIN mg/dL 0.4 0.3   ALK PHOS U/L 84 88   AST (SGOT) U/L 18 21   ALT (SGPT) U/L 16 18         Results from last 7 days   Lab Units 02/23/24  0201   INR  1.03     Results from last 7 days   Lab Units 02/22/24  1922 02/22/24  1518   HSTROP T ng/L 50* 53*       Imaging Results (Last 24 Hours)       ** No results found for the last 24 hours. **              Medications:  allopurinol, 100 mg, Oral, Daily  amitriptyline, 100 mg, Oral, Nightly  aspirin, 81 mg, Oral, Daily  budesonide-formoterol, 2 puff, Inhalation, BID - RT  cetirizine, 10 mg, Oral, Daily  cholecalciferol, 50,000 Units, Oral, Weekly  docusate sodium, 100 mg, Oral, BID  enoxaparin, 40 mg, Subcutaneous, Q24H  FLUoxetine, 40 mg, Oral, Daily  insulin lispro, 2-7 Units, Subcutaneous, 4x Daily AC & at Bedtime  metoprolol tartrate, 12.5 mg, Oral, Q12H  montelukast, 10 mg, Oral, Nightly  nicotine, 1 patch, Transdermal, Q24H  OLANZapine zydis, 10 mg, Oral, Nightly  oxybutynin XL, 10 mg, Oral, Daily  pantoprazole, 40 mg, Oral, Q AM  polyethylene glycol, 17 g, Oral, Daily  rosuvastatin, 20 mg, Oral, Nightly  sodium chloride, 10 mL, Intravenous, Q12H               Assessment & Plan   Acute intertrochanteric fracture of the right femur: S/P mechanical fall. S/P repair with nailing. PT/OT. Pain control. Add bowel regimen. Ortho input appreciated.     Acute urinary retention: Placed orders for hayes catheter already in place. Address constipation with additional bowel regimen.     Leukocytosis: Likely reactive. No obvious signs of infection at present. Repeat CBC in the AM.     COPD with suspected chronic hypoxic respiratory failure: Stable without an acute exacerbation. Cont inhaler regimen. Will need outpatient  follow up in COPD clinic.     NSTEMI: Likely type II. Delta of -3. Echo revealed an EF of 46-50%, grade II diastolic dysfunction, moderate TR and severe pulmonary HTN. Cont ASA and statin.     Essential HTN: BP has been borderline low intermittently. Cont metoprolol with hold parameters. Cont to monitor.     Dementia: Cont supportive treatment.     DVT PPX: Lovenox per ortho recs.     Disposition IPR referral.     Quinton Woods DO  02/26/24  15:35 EST

## 2024-02-26 NOTE — CASE MANAGEMENT/SOCIAL WORK
Discharge Planning Assessment   Roger     Patient Name: Breanne Dickson  MRN: [id number]  Today's Date: 2/26/2024    Admit Date: 2/22/2024            Discharge Plan       Row Name 02/26/24 1509       Plan    Plan Pt has  IPR consult.  spoke with Pt and Pt's dtr Maykel at bedside who voice agreement to  IPR. SS spoke with  IPR per Grant who states facility will submit Pt's insurance for pre-auth once OT evlauation is available.  IPR to notify OT team.  to follow.                  RONI Clifton     PATIENT SITTING UP AT THE EDGE OF THE BED. PRN RESTORIL GIVEN PER REQUEST FOR
SOMETHING TO SLEEP. PATIENT ASSISTED TO BATHROOM AND BACK BY STAFF. GAIT
UNSTEADY. IV TO RIGHT HAND LEAKING. IV SITE ATTEMPTED BUT UNSUCCESSFUL. ICU
NURSE WILL BE IN TO TRY FOR ANOTHER IV SITE. NO OTHER NEEDS PER PATIENT. CALL
LIGHT WITHIN REACH. BED ALARM ON FOR SAFETY.

## 2024-02-26 NOTE — THERAPY TREATMENT NOTE
Acute Care - Physical Therapy Treatment Note   Roger     Patient Name: Breanne Dickson  : 1955  MRN: [id number]  Today's Date: 2024   Onset of Illness/Injury or Date of Surgery: 24  Visit Dx:     ICD-10-CM ICD-9-CM   1. Closed nondisplaced intertrochanteric fracture of right femur, initial encounter  S72.144A 820.21   2. Closed nondisplaced fracture of greater trochanter of right femur, initial encounter  S72.114A 820.20   3. Acute on chronic respiratory failure with hypoxia  J96.21 518.84     799.02   4. Hypervolemia, unspecified hypervolemia type  E87.70 276.69   5. COPD with acute exacerbation  J44.1 491.21   6. Sepsis, due to unspecified organism, unspecified whether acute organ dysfunction present  A41.9 038.9     995.91     Patient Active Problem List   Diagnosis    Intertrochanteric fracture of right femur     Past Medical History:   Diagnosis Date    Arthritis     Diabetes mellitus     Hyperlipidemia     Hypertension     Multiple rib fractures      History reviewed. No pertinent surgical history.  PT Assessment (last 12 hours)       PT Evaluation and Treatment       Row Name 24 1120          Physical Therapy Time and Intention    Subjective Information no complaints  -AG     Document Type therapy note (daily note)  -AG     Mode of Treatment physical therapy  -AG     Patient Effort good  -AG     Symptoms Noted During/After Treatment fatigue  -AG       Row Name 24 1120          General Information    Patient Profile Reviewed yes  -AG     Patient Observations agree to therapy;cooperative;alert  -AG     Patient/Family/Caregiver Comments/Observations pt. initially supine on 2L O2 nc; hayes catheter in place.  Daughter at bedside and provides support.  Pt. is pleasantly confused and agreeable to participation.  -AG     Existing Precautions/Restrictions fall  -AG     Limitations/Impairments safety/cognitive  -AG     Risks Reviewed patient:;increased discomfort  -AG     Benefits  Reviewed patient:;improve function;increase independence;increase strength;increase balance;increase knowledge;decrease risk of DVT  -AG     Barriers to Rehab cognitive status  -AG       Row Name 02/26/24 1120          Pain    Additional Documentation Pain Scale: FACES Pre/Post-Treatment (Group)  -AG       Row Name 02/26/24 1120          Pain Scale: FACES Pre/Post-Treatment    Pain: FACES Scale, Pretreatment 2-->hurts little bit  -AG     Posttreatment Pain Rating 4-->hurts little more  -AG     Pain Location - Side/Orientation Right  -AG     Pain Location - hip  -AG       Row Name 02/26/24 1120          Cognition    Affect/Mental Status (Cognition) confused  -AG     Orientation Status (Cognition) oriented to;person  -AG     Follows Commands (Cognition) follows one-step commands;repetition of directions required;verbal cues/prompting required  -AG     Safety Deficit (Cognition) insight into deficits/self-awareness  -AG       Row Name 02/26/24 1120          Vision Assessment/Intervention    Visual Impairment/Limitations corrective lenses full-time  -       Row Name 02/26/24 1120          Mobility    Extremity Weight-bearing Status right lower extremity  -AG     Right Lower Extremity (Weight-bearing Status) weight-bearing as tolerated (WBAT)  -       Row Name 02/26/24 1120          Bed Mobility    Bed Mobility scooting/bridging;supine-sit;rolling left  -AG     Rolling Left [place] (Bed Mobility) verbal cues;nonverbal cues (demo/gesture);minimum assist (75% patient effort)  -AG     Scooting/Bridging [place] (Bed Mobility) verbal cues;nonverbal cues (demo/gesture);minimum assist (75% patient effort)  -AG     Supine-Sit [place] (Bed Mobility) verbal cues;nonverbal cues (demo/gesture);minimum assist (75% patient effort)  -AG     Bed Mobility, Safety Issues cognitive deficits limit understanding;decreased use of legs for bridging/pushing;decreased use of arms for pushing/pulling  -AG     Assistive Device  (Bed Mobility) bed rails  -AG       Row Name 02/26/24 1120          Transfers    Transfers sit-stand transfer;stand-sit transfer;stand pivot/stand step transfer  -AG       Row Name 02/26/24 1120          Sit-Stand Transfer    Sit-Stand [place] (Transfers) verbal cues;nonverbal cues (demo/gesture);minimum assist (75% patient effort)  -AG     Assistive Device (Sit-Stand Transfers) walker, front-wheeled  -AG       Row Name 02/26/24 1120          Stand-Sit Transfer    Stand-Sit [place] (Transfers) minimum assist (75% patient effort);nonverbal cues (demo/gesture);verbal cues  -AG     Assistive Device (Stand-Sit Transfers) walker, front-wheeled  -AG       Row Name 02/26/24 1120          Stand Pivot/Stand Step Transfer    Stand Pivot/Stand Step [place] (Transfers) verbal cues;nonverbal cues (demo/gesture);minimum assist (75% patient effort)  -AG     Assistive Device (Stand Pivot Stand Step Transfer) walker, front-wheeled  -AG       Row Name 02/26/24 1120          Gait/Stairs (Locomotion)    Gait/Stairs Locomotion gait/ambulation independence;gait/ambulation assistive device;distance ambulated;gait pattern;gait deviations;maintains weight-bearing status  -AG     [place] Level (Gait) verbal cues;nonverbal cues (demo/gesture);minimum assist (75% patient effort)  -AG     Assistive Device (Gait) walker, front-wheeled  -AG     Patient was able to Ambulate yes  -AG     Distance in Feet (Gait) 30  -AG     Pattern (Gait) step-to  -AG     Deviations/Abnormal Patterns (Gait) antalgic;gait speed decreased;weight shifting decreased  -AG       Row Name 02/26/24 1120          Safety Issues, Functional Mobility    Safety Issues Affecting Function (Mobility) insight into deficits/self-awareness  -AG     Impairments Affecting Function (Mobility) balance;cognition;endurance/activity tolerance;pain;strength  -AG       Row Name 02/26/24 1120          Balance    Balance Assessment sitting static balance;sitting dynamic  balance;sit to stand dynamic balance;standing static balance;standing dynamic balance  -AG     Static Sitting Balance standby assist  -AG     Position, Sitting Balance unsupported;sitting edge of bed  -AG     Static Standing Balance contact guard;non-verbal cues (demo/gesture);verbal cues  -AG     Dynamic Standing Balance contact guard;non-verbal cues (demo/gesture);verbal cues  -AG     Position/Device Used, Standing Balance walker, front-wheeled  -AG       Row Name 02/26/24 1120          Motor Skills    Therapeutic Exercise hip;knee;ankle  -AG       Row Name 02/26/24 1120          Hip (Therapeutic Exercise)    Hip (Therapeutic Exercise) strengthening exercise  -AG     Hip Strengthening (Therapeutic Exercise) bilateral;aBduction;aDduction;flexion;extension;heel slides;supine  -AG       Row Name 02/26/24 1120          Knee (Therapeutic Exercise)    Knee (Therapeutic Exercise) strengthening exercise  -AG     Knee Strengthening (Therapeutic Exercise) bilateral;flexion;extension;heel slides;LAQ (long arc quad);sitting;supine  -AG       Row Name 02/26/24 1120          Ankle (Therapeutic Exercise)    Ankle (Therapeutic Exercise) strengthening exercise  -AG     Ankle Strengthening (Therapeutic Exercise) bilateral;dorsiflexion;plantarflexion;sitting;supine  -AG       Row Name             Wound 02/24/24 1033 Right anterior thigh    Wound - Properties Group Placement Date: 02/24/24  -JG Placement Time: 1033 -JG Side: Right  -JG Orientation: anterior  -JG Location: thigh  -JG    Retired Wound - Properties Group Placement Date: 02/24/24  -JG Placement Time: 1033  -JG Side: Right  -JG Orientation: anterior  -JG Location: thigh  -JG    Retired Wound - Properties Group Date first assessed: 02/24/24  -JG Time first assessed: 1033 -JG Side: Right  -JG Location: thigh  -JG      Row Name 02/26/24 1120          Coping    Observed Emotional State calm;cooperative  -AG     Verbalized Emotional State acceptance  -AG     Trust  Relationship/Rapport care explained;choices provided;thoughts/feelings acknowledged  -AG     Family/Support Persons daughter  -AG     Involvement in Care at bedside;attentive to patient;participating in care  -AG     Family/Support System Care support provided;self-care encouraged  -AG       Row Name 02/26/24 1120          Plan of Care Review    Plan of Care Reviewed With patient  -AG     Progress improving  -AG     Outcome Evaluation pt. still pleasantly confused but gave good effort and was able to initiate ambulating in room today; min A for bed mobility and STS.  Performed supine and sitting LE TE for strengthening.  PT will continue to follow and progress as tolerated.  -AG       Row Name 02/26/24 1120          Positioning and Restraints    Pre-Treatment Position in bed  -AG     Post Treatment Position chair  -AG     In Chair sitting;call light within reach;encouraged to call for assist;notified nsg;with family/caregiver  -AG       Row Name 02/26/24 1120          Therapy Plan Review/Discharge Plan (PT)    Therapy Plan Review (PT) evaluation/treatment results reviewed;care plan/treatment goals reviewed;risks/benefits reviewed;current/potential barriers reviewed;participants voiced agreement with care plan;participants included;patient  -AG               User Key  (r) = Recorded By, (t) = Taken By, (c) = Cosigned By      Initials Name Provider Type    Lourdes Braun, RN Registered Nurse    Ijeoma Ann, PT Physical Therapist                    Physical Therapy Education       Title: PT OT SLP Therapies (In Progress)       Topic: Physical Therapy (In Progress)       Point: Mobility training (In Progress)       Learning Progress Summary             Patient Acceptance, E,D, NR by  at 2/26/2024 1120   Family Acceptance, E,D, NR by  at 2/26/2024 1120                         Point: Home exercise program (In Progress)       Learning Progress Summary             Patient Acceptance, E,D, NR by  at  2/26/2024 1120   Family Acceptance, E,D, NR by  at 2/26/2024 1120                         Point: Body mechanics (In Progress)       Learning Progress Summary             Patient Acceptance, E,D, NR by  at 2/26/2024 1120   Family Acceptance, E,D, NR by AG at 2/26/2024 1120                         Point: Precautions (In Progress)       Learning Progress Summary             Patient Acceptance, E,D, NR by  at 2/26/2024 1120   Family Acceptance, E,D, NR by  at 2/26/2024 1120                                         User Key       Initials Effective Dates Name Provider Type Discipline     06/16/21 -  Ijeoma Lutz, PT Physical Therapist PT                  PT Recommendation and Plan  Anticipated Discharge Disposition (PT): inpatient rehabilitation facility  Plan of Care Reviewed With: patient  Progress: improving  Outcome Evaluation: pt. still pleasantly confused but gave good effort and was able to initiate ambulating in room today; min A for bed mobility and STS.  Performed supine and sitting LE TE for strengthening.  PT will continue to follow and progress as tolerated.       Time Calculation:    PT Charges       Row Name 02/26/24 1120             Time Calculation    PT Received On 02/26/24  -      PT - Next Appointment 02/27/24  -                User Key  (r) = Recorded By, (t) = Taken By, (c) = Cosigned By      Initials Name Provider Type     Ijeoma Lutz, PT Physical Therapist                  Therapy Charges for Today       Code Description Service Date Service Provider Modifiers Qty    66334870097 HC GAIT TRAINING EA 15 MIN 2/26/2024 Ijeoma Lutz, PT GP 1    19199655245 HC PT THER PROC EA 15 MIN 2/26/2024 Ijeoma Lutz, PT GP 1    19229211126 HC PT THERAPEUTIC ACT EA 15 MIN 2/26/2024 Ijeoma Lutz, PT GP 1            PT G-Codes  AM-PAC 6 Clicks Score (PT): 18    Ijeoma Lutz PT  2/26/2024

## 2024-02-27 LAB
ALBUMIN SERPL-MCNC: 3.3 G/DL (ref 3.5–5.2)
ALBUMIN/GLOB SERPL: 1 G/DL
ALP SERPL-CCNC: 95 U/L (ref 39–117)
ALT SERPL W P-5'-P-CCNC: 18 U/L (ref 1–33)
ANION GAP SERPL CALCULATED.3IONS-SCNC: 10.3 MMOL/L (ref 5–15)
AST SERPL-CCNC: 33 U/L (ref 1–32)
BACTERIA SPEC AEROBE CULT: NORMAL
BACTERIA SPEC AEROBE CULT: NORMAL
BASOPHILS # BLD AUTO: 0.04 10*3/MM3 (ref 0–0.2)
BASOPHILS NFR BLD AUTO: 0.3 % (ref 0–1.5)
BILIRUB SERPL-MCNC: 0.3 MG/DL (ref 0–1.2)
BUN SERPL-MCNC: 15 MG/DL (ref 8–23)
BUN/CREAT SERPL: 16.7 (ref 7–25)
CALCIUM SPEC-SCNC: 9 MG/DL (ref 8.6–10.5)
CHLORIDE SERPL-SCNC: 106 MMOL/L (ref 98–107)
CO2 SERPL-SCNC: 22.7 MMOL/L (ref 22–29)
CREAT SERPL-MCNC: 0.9 MG/DL (ref 0.57–1)
DEPRECATED RDW RBC AUTO: 56.8 FL (ref 37–54)
EGFRCR SERPLBLD CKD-EPI 2021: 70.2 ML/MIN/1.73
EOSINOPHIL # BLD AUTO: 0.54 10*3/MM3 (ref 0–0.4)
EOSINOPHIL NFR BLD AUTO: 3.9 % (ref 0.3–6.2)
ERYTHROCYTE [DISTWIDTH] IN BLOOD BY AUTOMATED COUNT: 17.5 % (ref 12.3–15.4)
GLOBULIN UR ELPH-MCNC: 3.2 GM/DL
GLUCOSE BLDC GLUCOMTR-MCNC: 113 MG/DL (ref 70–130)
GLUCOSE BLDC GLUCOMTR-MCNC: 128 MG/DL (ref 70–130)
GLUCOSE BLDC GLUCOMTR-MCNC: 132 MG/DL (ref 70–130)
GLUCOSE BLDC GLUCOMTR-MCNC: 167 MG/DL (ref 70–130)
GLUCOSE SERPL-MCNC: 135 MG/DL (ref 65–99)
HCT VFR BLD AUTO: 36.4 % (ref 34–46.6)
HGB BLD-MCNC: 11.7 G/DL (ref 12–15.9)
IMM GRANULOCYTES # BLD AUTO: 0.06 10*3/MM3 (ref 0–0.05)
IMM GRANULOCYTES NFR BLD AUTO: 0.4 % (ref 0–0.5)
LYMPHOCYTES # BLD AUTO: 3.2 10*3/MM3 (ref 0.7–3.1)
LYMPHOCYTES NFR BLD AUTO: 23 % (ref 19.6–45.3)
MCH RBC QN AUTO: 28.6 PG (ref 26.6–33)
MCHC RBC AUTO-ENTMCNC: 32.1 G/DL (ref 31.5–35.7)
MCV RBC AUTO: 89 FL (ref 79–97)
MONOCYTES # BLD AUTO: 1.02 10*3/MM3 (ref 0.1–0.9)
MONOCYTES NFR BLD AUTO: 7.3 % (ref 5–12)
NEUTROPHILS NFR BLD AUTO: 65.1 % (ref 42.7–76)
NEUTROPHILS NFR BLD AUTO: 9.05 10*3/MM3 (ref 1.7–7)
NRBC BLD AUTO-RTO: 0 /100 WBC (ref 0–0.2)
PLATELET # BLD AUTO: 281 10*3/MM3 (ref 140–450)
PMV BLD AUTO: 9.7 FL (ref 6–12)
POTASSIUM SERPL-SCNC: 3.8 MMOL/L (ref 3.5–5.2)
PROT SERPL-MCNC: 6.5 G/DL (ref 6–8.5)
RBC # BLD AUTO: 4.09 10*6/MM3 (ref 3.77–5.28)
SODIUM SERPL-SCNC: 139 MMOL/L (ref 136–145)
WBC NRBC COR # BLD AUTO: 13.91 10*3/MM3 (ref 3.4–10.8)

## 2024-02-27 PROCEDURE — 94664 DEMO&/EVAL PT USE INHALER: CPT

## 2024-02-27 PROCEDURE — 80053 COMPREHEN METABOLIC PANEL: CPT | Performed by: INTERNAL MEDICINE

## 2024-02-27 PROCEDURE — 63710000001 INSULIN LISPRO (HUMAN) PER 5 UNITS: Performed by: GENERAL PRACTICE

## 2024-02-27 PROCEDURE — 97110 THERAPEUTIC EXERCISES: CPT

## 2024-02-27 PROCEDURE — 85025 COMPLETE CBC W/AUTO DIFF WBC: CPT | Performed by: INTERNAL MEDICINE

## 2024-02-27 PROCEDURE — 94799 UNLISTED PULMONARY SVC/PX: CPT

## 2024-02-27 PROCEDURE — 99232 SBSQ HOSP IP/OBS MODERATE 35: CPT | Performed by: INTERNAL MEDICINE

## 2024-02-27 PROCEDURE — 97166 OT EVAL MOD COMPLEX 45 MIN: CPT

## 2024-02-27 PROCEDURE — 94760 N-INVAS EAR/PLS OXIMETRY 1: CPT

## 2024-02-27 PROCEDURE — 94761 N-INVAS EAR/PLS OXIMETRY MLT: CPT

## 2024-02-27 PROCEDURE — 82948 REAGENT STRIP/BLOOD GLUCOSE: CPT

## 2024-02-27 PROCEDURE — 25010000002 ENOXAPARIN PER 10 MG: Performed by: GENERAL PRACTICE

## 2024-02-27 PROCEDURE — 97116 GAIT TRAINING THERAPY: CPT

## 2024-02-27 RX ADMIN — HYDROCODONE BITARTRATE AND ACETAMINOPHEN 1 TABLET: 7.5; 325 TABLET ORAL at 11:30

## 2024-02-27 RX ADMIN — Medication 10 ML: at 21:02

## 2024-02-27 RX ADMIN — ENOXAPARIN SODIUM 40 MG: 40 INJECTION SUBCUTANEOUS at 23:56

## 2024-02-27 RX ADMIN — BUDESONIDE AND FORMOTEROL FUMARATE DIHYDRATE 2 PUFF: 160; 4.5 AEROSOL RESPIRATORY (INHALATION) at 07:15

## 2024-02-27 RX ADMIN — INSULIN LISPRO 2 UNITS: 100 INJECTION, SOLUTION INTRAVENOUS; SUBCUTANEOUS at 17:36

## 2024-02-27 RX ADMIN — IPRATROPIUM BROMIDE AND ALBUTEROL SULFATE 3 ML: 2.5; .5 SOLUTION RESPIRATORY (INHALATION) at 07:15

## 2024-02-27 RX ADMIN — OLANZAPINE 10 MG: 10 TABLET, ORALLY DISINTEGRATING ORAL at 21:02

## 2024-02-27 RX ADMIN — BUDESONIDE AND FORMOTEROL FUMARATE DIHYDRATE 2 PUFF: 160; 4.5 AEROSOL RESPIRATORY (INHALATION) at 19:50

## 2024-02-27 RX ADMIN — ASPIRIN 81 MG: 81 TABLET, COATED ORAL at 08:33

## 2024-02-27 RX ADMIN — Medication 10 ML: at 08:33

## 2024-02-27 RX ADMIN — FLUOXETINE HYDROCHLORIDE 40 MG: 20 CAPSULE ORAL at 08:33

## 2024-02-27 RX ADMIN — MONTELUKAST SODIUM 10 MG: 10 TABLET, COATED ORAL at 21:01

## 2024-02-27 RX ADMIN — ROSUVASTATIN CALCIUM 20 MG: 20 TABLET, FILM COATED ORAL at 21:00

## 2024-02-27 RX ADMIN — HYDROCODONE BITARTRATE AND ACETAMINOPHEN 1 TABLET: 7.5; 325 TABLET ORAL at 17:37

## 2024-02-27 RX ADMIN — NICOTINE 1 PATCH: 14 PATCH, EXTENDED RELEASE TRANSDERMAL at 08:33

## 2024-02-27 RX ADMIN — AMITRIPTYLINE HYDROCHLORIDE 100 MG: 50 TABLET, FILM COATED ORAL at 21:02

## 2024-02-27 RX ADMIN — ENOXAPARIN SODIUM 40 MG: 40 INJECTION SUBCUTANEOUS at 00:02

## 2024-02-27 RX ADMIN — HYDROCODONE BITARTRATE AND ACETAMINOPHEN 1 TABLET: 7.5; 325 TABLET ORAL at 23:56

## 2024-02-27 RX ADMIN — OXYBUTYNIN CHLORIDE 10 MG: 5 TABLET, EXTENDED RELEASE ORAL at 08:32

## 2024-02-27 RX ADMIN — CETIRIZINE HYDROCHLORIDE 10 MG: 10 TABLET, FILM COATED ORAL at 08:33

## 2024-02-27 RX ADMIN — ALLOPURINOL 100 MG: 100 TABLET ORAL at 08:33

## 2024-02-27 NOTE — SIGNIFICANT NOTE
02/27/24 1019   Post Acute Pre-Cert Documentation   Request Submitted by Facility - Type: Post Acute   Post-Acute Authorization Type Submitted: IRF   Date Post Acute Pre-Cert Inititated per Facility 02/27/24   Verification from Payer Yes   Accepting Facility [facility] Acute Inpatient Rehab   All Clinicals Submitted? Yes   Had Accepting Facility at Time of Submission Yes   Response Communicated to:    Authorization Number: [employer] Auth ID# [id number]   Post Acute Pre-Cert Initiated Comment Prior auth request for Rehab has been submitted to [facility] Medicare Replacement via [employer] portal.

## 2024-02-27 NOTE — CASE MANAGEMENT/SOCIAL WORK
Discharge Planning Assessment   Roger     Patient Name: Breanne Dickson  MRN: [id number]  Today's Date: 2/27/2024    Admit Date: 2/22/2024       Discharge Plan       Row Name 02/27/24 0958       Plan    Plan SS spoke with  ROOPA per Grant who states he plans to submit to Pt's insurance for pre-auth today. SS notified by OT that OT eval note is completed. SS to follow.    12:49pm: SS notified by  IPR per Patricia that [facility] Medicare Replacement called to offer a peer to peer. It can be scheduled by calling [phone] option 5 with deadline 02/27 by 5:00pm. Plan auth ID# [id number]. [employer] Auth ID# [id number]. Member ID# [id number]. SS notified Physician Advisor Dr Jose Juan Whitlock. SS to follow.     16:18pm: SS notified by PA Dr Whitlock that Peer to peer was completed, denial upheld; insurance recommended SNF for short term rehab. SS spoke with Pt's dtr Maykel who states she and Pt's other dtr will discuss SNF vs home with dtr. SS updated Dr. Woods. SS to follow.                   Continued Care and Services - Admitted Since 2/22/2024       Destination       Service Provider Request Status Selected Services Address Phone Fax Patient Preferred    [facility] Pending - No Request Sent N/A [address] [place] [address] [phone] --                    RONI Clifton

## 2024-02-27 NOTE — PROGRESS NOTES
[facility] HOSPITALIST PROGRESS NOTE    Subjective     History:   Breanne Dickson is a 68 y.o. female admitted on 2/22/2024 secondary to Intertrochanteric fracture of right femur     Procedures:   2/24/24: Right intertrochanteric hip fracture repair with short cephalomedullary nail    CC: Follow up hip fracture     Patient seen and examined with EUGENE Moreira. Awake and alert but pleasantly confused. Family at bedside and supportive. No reported CP. No reported vomiting. (+) BM. No acute events overnight per RN.     History taken from: patient's family, chart, and RN.      Objective     Vital Signs  Temp:  [96.4 °F (35.8 °C)-98.7 °F (37.1 °C)] 97.9 °F (36.6 °C)  Heart Rate:  [] 103  Resp:  [16-20] 18  BP: ()/(45-89) 109/68    Intake/Output Summary (Last 24 hours) at 2/27/2024 1519  Last data filed at 2/27/2024 1300  Gross per 24 hour   Intake 720 ml   Output 1200 ml   Net -480 ml         Physical Exam: Unchanged from previous.   General:    Awake, alert but confused, in no acute distress, chronically ill appearing   Heart:      Normal S1 and S2. Regular rate and rhythm. No significant murmur, rubs or gallops appreciated.   Lungs:     Respirations regular, even and unlabored. Lungs clear to auscultation B/L. No wheezes, rales or rhonchi.   Abdomen:   Soft and nontender. No guarding, rebound tenderness or  organomegaly noted. Bowel sounds present x 4.   Extremities:  No clubbing, cyanosis or edema noted.      Results Review:    Results from last 7 days   Lab Units 02/27/24  0123 02/26/24  0132 02/25/24  0843 02/24/24  0201 02/23/24  0201 02/22/24  1518   WBC 10*3/mm3 13.91* 12.59* 11.55* 11.93* 10.55 13.08*   HEMOGLOBIN g/dL 11.7* 11.5* 12.9 13.0 13.5 13.6   PLATELETS 10*3/mm3 281 280 327 324 341 336     Results from last 7 days   Lab Units 02/27/24  0123 02/26/24  0132 02/25/24  0843 02/24/24  0201 02/23/24  0201 02/22/24  1518   SODIUM mmol/L 139 139 140 139 140 138   POTASSIUM mmol/L 3.8 4.0  4.5 3.9 3.8 4.0   CHLORIDE mmol/L 106 104 104 105 101 103   CO2 mmol/L 22.7 23.7 22.5 22.8 25.4 22.6   BUN mg/dL 15 14 15 27* 26* 27*   CREATININE mg/dL 0.90 0.93 0.82 0.94 0.96 0.94   CALCIUM mg/dL 9.0 8.8 9.2 9.0 9.6 9.6   GLUCOSE mg/dL 135* 133* 146* 127* 155* 91     Results from last 7 days   Lab Units 02/27/24  0123 02/23/24  0201 02/22/24  1518   BILIRUBIN mg/dL 0.3 0.4 0.3   ALK PHOS U/L 95 84 88   AST (SGOT) U/L 33* 18 21   ALT (SGPT) U/L 18 16 18         Results from last 7 days   Lab Units 02/23/24  0201   INR  1.03     Results from last 7 days   Lab Units 02/22/24  1922 02/22/24  1518   HSTROP T ng/L 50* 53*       Imaging Results (Last 24 Hours)       ** No results found for the last 24 hours. **              Medications:  allopurinol, 100 mg, Oral, Daily  amitriptyline, 100 mg, Oral, Nightly  aspirin, 81 mg, Oral, Daily  budesonide-formoterol, 2 puff, Inhalation, BID - RT  cetirizine, 10 mg, Oral, Daily  cholecalciferol, 50,000 Units, Oral, Weekly  docusate sodium, 100 mg, Oral, BID  enoxaparin, 40 mg, Subcutaneous, Q24H  FLUoxetine, 40 mg, Oral, Daily  insulin lispro, 2-7 Units, Subcutaneous, 4x Daily AC & at Bedtime  metoprolol tartrate, 12.5 mg, Oral, Q12H  montelukast, 10 mg, Oral, Nightly  nicotine, 1 patch, Transdermal, Q24H  OLANZapine zydis, 10 mg, Oral, Nightly  oxybutynin XL, 10 mg, Oral, Daily  pantoprazole, 40 mg, Oral, Q AM  polyethylene glycol, 17 g, Oral, Daily  rosuvastatin, 20 mg, Oral, Nightly  sodium chloride, 10 mL, Intravenous, Q12H               Assessment & Plan   Acute intertrochanteric fracture of the right femur: S/P mechanical fall. S/P repair with nailing. PT/OT. Pain control. Cont bowel regimen. Ortho input appreciated.     Acute urinary retention: Improved and hayes D/C'd.     Leukocytosis: Likely reactive. No obvious signs of infection at present. Repeat CBC in the AM.     COPD with suspected chronic hypoxic respiratory failure: Stable without an acute exacerbation. Cont  inhaler regimen. Will need outpatient follow up in COPD clinic.     NSTEMI: Likely type II. Delta of -3. Echo revealed an EF of 46-50%, grade II diastolic dysfunction, moderate TR and severe pulmonary HTN. Cont ASA and statin.     Essential HTN: BP has been borderline low intermittently but stable. Cont metoprolol with hold parameters. Cont to monitor.     Dementia: Cont supportive treatment.     DVT PPX: Lovenox per ortho recs.     Disposition IPR has been denied with peer to peer planned.     Quinton Woods,   02/27/24  15:19 EST

## 2024-02-27 NOTE — THERAPY TREATMENT NOTE
Acute Care - Physical Therapy Treatment Note   [place]     Patient Name: Breanne Dickson  : 1955  MRN: [id number]  Today's Date: 2024   Onset of Illness/Injury or Date of Surgery: 24  Visit Dx:     ICD-10-CM ICD-9-CM   1. Closed nondisplaced intertrochanteric fracture of right femur, initial encounter  S72.144A 820.21   2. Closed nondisplaced fracture of greater trochanter of right femur, initial encounter  S72.114A 820.20   3. Acute on chronic respiratory failure with hypoxia  J96.21 518.84     799.02   4. Hypervolemia, unspecified hypervolemia type  E87.70 276.69   5. COPD with acute exacerbation  J44.1 491.21   6. Sepsis, due to unspecified organism, unspecified whether acute organ dysfunction present  A41.9 038.9     995.91     Patient Active Problem List   Diagnosis    Intertrochanteric fracture of right femur     Past Medical History:   Diagnosis Date    Arthritis     Diabetes mellitus     Hyperlipidemia     Hypertension     Multiple rib fractures      Past Surgical History:   Procedure Laterality Date    FEMUR IM NAILING/RODDING Right 2024    Procedure: FEMUR INTRAMEDULLARY NAILING/RODDING;  Surgeon: Brijesh Burton MD;  Location: [facility];  Service: Orthopedics;  Laterality: Right;     PT Assessment (last 12 hours)       PT Evaluation and Treatment       Row Name 24 1110          Physical Therapy Time and Intention    Subjective Information no complaints  -HC     Document Type therapy note (daily note)  -HC     Mode of Treatment physical therapy  -HC     Patient Effort good  -     Comment Pt and RN in agreement for PT. Pt does well on this date and walked 140' with standing rest breaks as needed. Pt does require RW and Bessy/CGA, minimal cueing secondary to confusion. Sitting exercises completed up in chair once back into room. Pt would benefit from IPR to improve mobility and return to PLOF.  -       Row Name 24 1110          General Information    Patient Profile  Reviewed yes  -     Existing Precautions/Restrictions fall  -HC     Limitations/Impairments safety/cognitive  -       Row Name 02/27/24 1110          Pain Scale: FACES Pre/Post-Treatment    Pain: FACES Scale, Pretreatment 2-->hurts little bit  -     Posttreatment Pain Rating 4-->hurts little more  -       Row Name 02/27/24 1110          Cognition    Affect/Mental Status (Cognition) confused  -     Orientation Status (Cognition) oriented to;person  -HC     Follows Commands (Cognition) follows one-step commands;repetition of directions required;verbal cues/prompting required  -     Personal Safety Interventions fall prevention program maintained;gait belt;nonskid shoes/slippers when out of bed;supervised activity  -       Row Name 02/27/24 1110          Vision Assessment/Intervention    Visual Impairment/Limitations corrective lenses full-time  -       Row Name 02/27/24 1110          Mobility    Extremity Weight-bearing Status right lower extremity  -     Right Lower Extremity (Weight-bearing Status) weight-bearing as tolerated (WBAT)  -       Row Name 02/27/24 1110          Bed Mobility    Bed Mobility scooting/bridging;supine-sit;rolling left  -     Rolling Left [place] (Bed Mobility) verbal cues;nonverbal cues (demo/gesture);minimum assist (75% patient effort)  -     Supine-Sit [place] (Bed Mobility) verbal cues;nonverbal cues (demo/gesture);minimum assist (75% patient effort)  -       Row Name 02/27/24 1110          Transfers    Transfers sit-stand transfer;stand-sit transfer;stand pivot/stand step transfer  -       Row Name 02/27/24 1110          Sit-Stand Transfer    Sit-Stand [place] (Transfers) verbal cues;nonverbal cues (demo/gesture);minimum assist (75% patient effort)  -     Assistive Device (Sit-Stand Transfers) walker, front-wheeled  -       Row Name 02/27/24 1110          Stand-Sit Transfer    Stand-Sit [place] (Transfers) minimum assist (75% patient  effort);nonverbal cues (demo/gesture);verbal cues  -     Assistive Device (Stand-Sit Transfers) walker, front-wheeled  -HC       Row Name 02/27/24 1110          Stand Pivot/Stand Step Transfer    Stand Pivot/Stand Step [place] (Transfers) verbal cues;nonverbal cues (demo/gesture);minimum assist (75% patient effort)  -     Assistive Device (Stand Pivot Stand Step Transfer) walker, front-wheeled  -HC       Row Name 02/27/24 1110          Gait/Stairs (Locomotion)    Gait/Stairs Locomotion gait/ambulation independence;gait/ambulation assistive device;distance ambulated;gait pattern;gait deviations;maintains weight-bearing status  -     [place] Level (Gait) verbal cues;nonverbal cues (demo/gesture);minimum assist (75% patient effort);contact guard  -     Assistive Device (Gait) walker, front-wheeled  -     Distance in Feet (Gait) 140  -HC     Pattern (Gait) step-to  -HC     Deviations/Abnormal Patterns (Gait) antalgic;gait speed decreased;weight shifting decreased  -       Row Name 02/27/24 1110          Safety Issues, Functional Mobility    Impairments Affecting Function (Mobility) balance;cognition;endurance/activity tolerance;pain;strength  -       Row Name 02/27/24 1110          Motor Skills    Therapeutic Exercise other (see comments)  Sitting: AP, LAQ, March, knees in/out  -       Row Name             Wound 02/24/24 1033 Right anterior thigh    Wound - Properties Group Placement Date: 02/24/24  -JG Placement Time: 1033  -JG Side: Right  -JG Orientation: anterior  -JG Location: thigh  -JG    Retired Wound - Properties Group Placement Date: 02/24/24  -JG Placement Time: 1033  -JG Side: Right  -JG Orientation: anterior  -JG Location: thigh  -JG    Retired Wound - Properties Group Date first assessed: 02/24/24  -JG Time first assessed: 1033  -JG Side: Right  -JG Location: thigh  -JG      Row Name 02/27/24 1110          Coping    Observed Emotional State calm;cooperative  -     Verbalized  Emotional State acceptance  -HC     Family/Support Persons daughter  -HC     Involvement in Care at bedside;attentive to patient;participating in care  -HC       Row Name 02/27/24 1110          Positioning and Restraints    Pre-Treatment Position in bed  -HC     Post Treatment Position chair  -HC     In Chair sitting;call light within reach;encouraged to call for assist;exit alarm on  -HC               User Key  (r) = Recorded By, (t) = Taken By, (c) = Cosigned By      Initials Name Provider Type    Lourdes Braun, RN Registered Nurse    Ivana Phan PTA Physical Therapist Assistant                    Physical Therapy Education       Title: PT OT SLP Therapies (In Progress)       Topic: Physical Therapy (In Progress)       Point: Mobility training (In Progress)       Learning Progress Summary             Patient Acceptance, E,D, NR by  at 2/26/2024 1120   Family Acceptance, E,D, NR by  at 2/26/2024 1120                         Point: Home exercise program (In Progress)       Learning Progress Summary             Patient Acceptance, E,D, NR by  at 2/26/2024 1120   Family Acceptance, E,D, NR by  at 2/26/2024 1120                         Point: Body mechanics (In Progress)       Learning Progress Summary             Patient Acceptance, E,D, NR by  at 2/26/2024 1120   Family Acceptance, E,D, NR by  at 2/26/2024 1120                         Point: Precautions (In Progress)       Learning Progress Summary             Patient Acceptance, E,D, NR by  at 2/26/2024 1120   Family Acceptance, E,D, NR by  at 2/26/2024 1120                                         User Key       Initials Effective Dates Name Provider Type [facility] 06/16/21 -  Ijeoma Lutz, PT Physical Therapist PT                  PT Recommendation and Plan  Anticipated Discharge Disposition (PT): inpatient rehabilitation facility          Time Calculation:    PT Charges       Row Name 02/27/24 1115             Time  Calculation    PT Received On 02/27/24  -         Time Calculation- PT    Total Timed Code Minutes- PT 23 minute(s)  -HC                User Key  (r) = Recorded By, (t) = Taken By, (c) = Cosigned By      Initials Name Provider Type     Ivana Murillo, PTA Physical Therapist Assistant                  Therapy Charges for Today       Code Description Service Date Service Provider Modifiers Qty    92479755228 HC GAIT TRAINING EA 15 MIN 2/27/2024 Ivana Murillo, ALEKSANDRA GP, CQ 1    [id number]  PT THER PROC EA 15 MIN 2/27/2024 Ivana Murillo, ALEKSANDRA GP, CQ 1            PT G-Codes  AM-PAC 6 Clicks Score (PT): 18    Ivana Murillo PTA  2/27/2024

## 2024-02-27 NOTE — SIGNIFICANT NOTE
02/27/24 1237   Post Acute Pre-Cert Documentation   Request Submitted by Facility - Type: Post Acute   Post-Acute Authorization Type Submitted: IRF   Response Received from Insurance? P2P Requested   Response Communicated to:    Post Acute Pre-Cert Initiated Comment [employer] Re[employer] called to offer a peer to peer. It can be scheduled by calling [phone] option 5 with deadline 02/27 by 5:00pm. Plan auth ID# [id number]. [employer] Auth ID# [id number]. Member ID# [id number].

## 2024-02-27 NOTE — THERAPY EVALUATION
Acute Care - Occupational Therapy Initial Evaluation   [place]     Patient Name: Breanne Dickson  : 1955  MRN: [id number]  Today's Date: 2024  Onset of Illness/Injury or Date of Surgery: 24     Referring Physician: Micheal    Admit Date: 2024       ICD-10-CM ICD-9-CM   1. Closed nondisplaced intertrochanteric fracture of right femur, initial encounter  S72.144A 820.21   2. Closed nondisplaced fracture of greater trochanter of right femur, initial encounter  S72.114A 820.20   3. Acute on chronic respiratory failure with hypoxia  J96.21 518.84     799.02   4. Hypervolemia, unspecified hypervolemia type  E87.70 276.69   5. COPD with acute exacerbation  J44.1 491.21   6. Sepsis, due to unspecified organism, unspecified whether acute organ dysfunction present  A41.9 038.9     995.91     Patient Active Problem List   Diagnosis    Intertrochanteric fracture of right femur     Past Medical History:   Diagnosis Date    Arthritis     Diabetes mellitus     Hyperlipidemia     Hypertension     Multiple rib fractures      Past Surgical History:   Procedure Laterality Date    FEMUR IM NAILING/RODDING Right 2024    Procedure: FEMUR INTRAMEDULLARY NAILING/RODDING;  Surgeon: Brijesh Burton MD;  Location: [facility];  Service: Orthopedics;  Laterality: Right;         OT ASSESSMENT FLOWSHEET (last 12 hours)       OT Evaluation and Treatment       Row Name 24 0951                   OT Time and Intention    Document Type evaluation  -KR        Mode of Treatment occupational therapy  -KR        Patient Effort adequate  -KR           General Information    General Observations of Patient alert/cooperative  -KR           Living Environment    Current Living Arrangements home  -KR        People in Home alone  -KR        Primary Care Provided by self;child(maxx)  -KR           Cognition    Affect/Mental Status (Cognition) WFL  -KR        Orientation Status (Cognition) oriented to;person  -KR        Follows  Commands (Cognition) verbal cues/prompting required  -KR           Range of Motion Comprehensive    Comment, General Range of Motion BUE WFL to assist with self care/mobility tasks  -KR           Strength Comprehensive (MMT)    Comment, General Manual Muscle Testing (MMT) Assessment BUE observed WFL for manipulation of assistive device/self care assist  -KR           Activities of Daily Living    BADL Assessment/Intervention bathing;upper body dressing;lower body dressing;grooming;toileting  -KR           Bathing Assessment/Intervention    [place] Level (Bathing) bathing skills;minimum assist (75% patient effort)  -KR           Upper Body Dressing Assessment/Training    [place] Level (Upper Body Dressing) upper body dressing skills;standby assist;contact guard assist  -KR           Lower Body Dressing Assessment/Training    [place] Level (Lower Body Dressing) lower body dressing skills;moderate assist (50% patient effort)  -KR           Grooming Assessment/Training    [place] Level (Grooming) grooming skills;contact guard assist;minimum assist (75% patient effort)  -KR           Toileting Assessment/Training    [place] Level (Toileting) toileting skills;moderate assist (50% patient effort)  -KR           Wound 02/24/24 1033 Right anterior thigh    Wound - Properties Group Placement Date: 02/24/24  -JG Placement Time: 1033  -JG Side: Right  -JG Orientation: anterior  -JG Location: thigh  -JG    Retired Wound - Properties Group Placement Date: 02/24/24  -JG Placement Time: 1033  -JG Side: Right  -JG Orientation: anterior  -JG Location: thigh  -JG    Retired Wound - Properties Group Date first assessed: 02/24/24  -JG Time first assessed: 1033  -JG Side: Right  -JG Location: thigh  -JG       Plan of Care Review    Plan of Care Reviewed With patient  -KR           Therapy Assessment/Plan (OT)    Rehab Potential (OT) good, to achieve stated therapy goals  -KR        Criteria for Skilled  Therapeutic Interventions Met (OT) yes  -KR        Planned Therapy Interventions (OT) activity tolerance training;adaptive equipment training;BADL retraining  -KR        Comment, Therapy Assessment/Plan (OT) Pt displays deficits with mobility/self care tasks. She would benefit from additonal rehabilitation to enhance safety/independence in home environment.  -KR           Therapy Plan Review/Discharge Plan (OT)    Anticipated Discharge Disposition (OT) inpatient rehabilitation facility  -KR           OT Goals    Dressing Goal Selection (OT) dressing, OT goal 1  -KR        Activity Tolerance Goal Selection (OT) activity tolerance, OT goal 1  -KR           Dressing Goal 1 (OT)    Activity/Device (Dressing Goal 1, OT) dressing skills, all  -KR        [place]/Cues Needed (Dressing Goal 1, OT) minimum assist (75% or more patient effort)  -KR        Time Frame (Dressing Goal 1, OT) by discharge  -KR            Activity Tolerance Goal 1 (OT)    Activity Tolerance Goal 1 (OT) Increase to enhance performance of self care/mobility  -KR        Activity Level (Endurance Goal 1, OT) 20 min activity  -KR        Time Frame (Activity Tolerance Goal 1, OT) by discharge  -KR           Patient Education Goal (OT)    Activity (Patient Education Goal, OT) AE/DME training to enhance safety/independence in home environment  -KR        [place]/Cues/Accuracy (Memory Goal 2, OT) verbalizes understanding  -KR        Time Frame (Patient Education Goal, OT) by discharge  -KR                  User Key  (r) = Recorded By, (t) = Taken By, (c) = Cosigned By      Initials Name Effective Dates    Lourdes Braun RN 08/29/23 -     KR Brijesh Davis OT 06/16/21 -                            OT Recommendation and Plan  Planned Therapy Interventions (OT): activity tolerance training, adaptive equipment training, BADL retraining  Plan of Care Review  Plan of Care Reviewed With: patient  Plan of Care Reviewed With: patient        Time  Calculation:     Therapy Charges for Today       Code Description Service Date Service Provider Modifiers Qty    08147109024 HC OT EVAL MOD COMPLEXITY 4 2/27/2024 Brijesh Davis, OT GO 1                 Brijesh Davis OT  2/27/2024

## 2024-02-27 NOTE — CONSULTS
Today, I attempted COPD Education with Ms. Dickson, but was education was not successful. Due to her condition, she is unable to participate in COPD Education. When I introduced myself, she did acknowledge I was present and began ramblin about different things. When I would as her a question, she could not directly answer or she would just say I do not know. Due to her present condition, COPD Education was not completed. COPD Education was ordered by Serenity Vicente RN, Case Management, I let her know of the situation.     KEI Ling, RRT  COPD Navigator

## 2024-02-27 NOTE — PLAN OF CARE
Goal Outcome Evaluation:              Outcome Evaluation: Pt rested in bed this shift with family at bedside. Pt ambulated several times to restroom with assistance of daughter and walker. No new complaints or concerns, will continue with POC.

## 2024-02-27 NOTE — PROGRESS NOTES
Inpatient Progress Note  Breanne Dickson  Date: 02/27/24  MRN: [id number]      Subjective:  Resting in bed.  No pain to the right hip this morning.  No chest pain or shortness of air.  (+) bowel movement last night.  Worked with physical therapy yesterday and tolerated it well.  Walked in the room this morning with assistance and tolerated it well.  Family at bedside denies concerns.  VSS.  Afebrile.  No acute events overnight.      Objective:      Vitals:    02/26/24 2300 02/27/24 0300 02/27/24 0700 02/27/24 0715   BP: 114/75 106/89 121/73    BP Location: Right arm Right arm Left arm    Patient Position: Lying Lying Lying    Pulse: 93 88 84 88   Resp: 20 16 18 18   Temp:   96.4 °F (35.8 °C)    TempSrc:   Axillary    SpO2: 94% 99%  99%   Weight:       Height:              Physical Exam:  Constitutional:  Elderly appearing female.  No acute distress.        Musculoskeletal:  Upon examination of the right hip, the surgical dressing is clean and dry.  Minimal edema to the lateral hip, tissues soft.  No erythema, warmth, or tenderness.  (+) DF/PF at the ankle.  Capillary refill is brisk and light touch sensation is intact, distally.  (+) DP pulse.     Labs:    Results from last 7 days   Lab Units 02/27/24  0123 02/26/24  0132 02/25/24  0843 02/24/24  0201 02/23/24  0201 02/22/24  1619 02/22/24  1518   CRP mg/dL  --   --   --   --   --   --  8.72*   LACTATE mmol/L  --   --   --   --   --  1.2  --    WBC 10*3/mm3 13.91* 12.59* 11.55*   < > 10.55  --  13.08*   HEMOGLOBIN g/dL 11.7* 11.5* 12.9   < > 13.5  --  13.6   HEMATOCRIT % 36.4 36.0 41.1   < > 41.5  --  42.1   MCV fL 89.0 91.6 92.2   < > 88.5  --  89.2   MCHC g/dL 32.1 31.9 31.4*   < > 32.5  --  32.3   PLATELETS 10*3/mm3 281 280 327   < > 341  --  336   INR   --   --   --   --  1.03  --   --     < > = values in this interval not displayed.     Results from last 7 days   Lab Units 02/22/24  1420   PH, ARTERIAL pH units 7.435   PO2 ART mm Hg 53.6*   PCO2, ARTERIAL mm Hg  "31.5*   HCO3 ART mmol/L 21.1     Results from last 7 days   Lab Units 02/27/24  0123 02/26/24  0132 02/25/24  0843 02/24/24  0201 02/23/24  0201 02/22/24  1518   SODIUM mmol/L 139 139 140   < > 140 138   POTASSIUM mmol/L 3.8 4.0 4.5   < > 3.8 4.0   CHLORIDE mmol/L 106 104 104   < > 101 103   CO2 mmol/L 22.7 23.7 22.5   < > 25.4 22.6   BUN mg/dL 15 14 15   < > 26* 27*   CREATININE mg/dL 0.90 0.93 0.82   < > 0.96 0.94   CALCIUM mg/dL 9.0 8.8 9.2   < > 9.6 9.6   GLUCOSE mg/dL 135* 133* 146*   < > 155* 91   ALBUMIN g/dL 3.3*  --   --   --  3.8 3.8   BILIRUBIN mg/dL 0.3  --   --   --  0.4 0.3   ALK PHOS U/L 95  --   --   --  84 88   AST (SGOT) U/L 33*  --   --   --  18 21   ALT (SGPT) U/L 18  --   --   --  16 18    < > = values in this interval not displayed.   Estimated Creatinine Clearance: 56 mL/min (by C-G formula based on SCr of 0.9 mg/dL).  No results found for: \"AMMONIA\"  Results from last 7 days   Lab Units 02/22/24  1922 02/22/24  1518   HSTROP T ng/L 50* 53*     Results from last 7 days   Lab Units 02/22/24  1518   PROBNP pg/mL 22,342.0*     No results found for: \"HGBA1C\"  Lab Results   Component Value Date    TSH 0.915 02/22/2024         Pain Management Panel           No data to display                Blood Culture   Date Value Ref Range Status   02/22/2024 No growth at 4 days  Preliminary   02/22/2024 No growth at 4 days  Preliminary     No results found for: \"URINECX\"  No results found for: \"WOUNDCX\"  No results found for: \"STOOLCX\"              Radiology:  Imaging Results (Last 72 Hours)       Procedure Component Value Units Date/Time    FL Surgery Fluoro [[id number]] Collected: 02/25/24 0659     Updated: 02/25/24 0703    Narrative:      Fluoro time     PROCEDURE: Fluoroscopy in the operating room.     HISTORY: rt hip nailling; S72.144A-Nondisplaced intertrochanteric  fracture of right femur, initial encounter for closed fracture;  S72.114A-Nondisplaced fracture of greater trochanter of right " femur,  initial encounter for closed fracture; J96.21-Acute and chronic  respiratory failure with hypoxia; E87.70-Fluid overload, unspecified;  J44.1-Chronic obstructive pulmonary disease with (acute) exacerbation;  A41.     FINDINGS: Fluoroscopy time was provided by the radiology department for  the clinical service. 4 images were obtained and fluoroscopy time was  49.3 seconds. Images obtained demonstrate an intramedullary tony and  compression screw in place with no hardware complications.       Impression:      Please see the operative report.                 This report was finalized on 2/25/2024 7:00 AM by Kayla Canales M.D..                 Assessment:      Status post right hip IT fracture repair, POD #3.       Plan:      Encourage mobility / ambulation.    Weight bear as tolerated to the right lower extremity.    Physical therapy / Occupational therapy for mobility and ambulation.    On Lovenox for DVT prophylaxis x 14 days.    Maintain the dressing to the right hip.  My change as needed for saturation, no Betadine or wound cleansing needed.    Pain control.      Follow up in the office with Dr. Burton in 2 weeks for reevaluation.         Harrison Abraham, JAZMÍN   February 27, 2024   08:57 EST

## 2024-02-28 ENCOUNTER — READMISSION MANAGEMENT (OUTPATIENT)
Dept: CALL CENTER | Facility: HOSPITAL | Age: 69
End: 2024-02-28
Payer: MEDICARE

## 2024-02-28 VITALS
OXYGEN SATURATION: 90 % | TEMPERATURE: 97.8 F | DIASTOLIC BLOOD PRESSURE: 66 MMHG | BODY MASS INDEX: 26 KG/M2 | WEIGHT: 129 LBS | HEART RATE: 95 BPM | RESPIRATION RATE: 20 BRPM | SYSTOLIC BLOOD PRESSURE: 109 MMHG | HEIGHT: 59 IN

## 2024-02-28 LAB
ANION GAP SERPL CALCULATED.3IONS-SCNC: 12.2 MMOL/L (ref 5–15)
BASOPHILS # BLD AUTO: 0.07 10*3/MM3 (ref 0–0.2)
BASOPHILS NFR BLD AUTO: 0.6 % (ref 0–1.5)
BUN SERPL-MCNC: 14 MG/DL (ref 8–23)
BUN/CREAT SERPL: 18.9 (ref 7–25)
CALCIUM SPEC-SCNC: 9.1 MG/DL (ref 8.6–10.5)
CHLORIDE SERPL-SCNC: 103 MMOL/L (ref 98–107)
CO2 SERPL-SCNC: 22.8 MMOL/L (ref 22–29)
CREAT SERPL-MCNC: 0.74 MG/DL (ref 0.57–1)
DEPRECATED RDW RBC AUTO: 59.6 FL (ref 37–54)
EGFRCR SERPLBLD CKD-EPI 2021: 88.8 ML/MIN/1.73
EOSINOPHIL # BLD AUTO: 0.58 10*3/MM3 (ref 0–0.4)
EOSINOPHIL NFR BLD AUTO: 4.8 % (ref 0.3–6.2)
ERYTHROCYTE [DISTWIDTH] IN BLOOD BY AUTOMATED COUNT: 17.4 % (ref 12.3–15.4)
GLUCOSE BLDC GLUCOMTR-MCNC: 126 MG/DL (ref 70–130)
GLUCOSE BLDC GLUCOMTR-MCNC: 156 MG/DL (ref 70–130)
GLUCOSE SERPL-MCNC: 124 MG/DL (ref 65–99)
HCT VFR BLD AUTO: 37.4 % (ref 34–46.6)
HGB BLD-MCNC: 11.6 G/DL (ref 12–15.9)
IMM GRANULOCYTES # BLD AUTO: 0.05 10*3/MM3 (ref 0–0.05)
IMM GRANULOCYTES NFR BLD AUTO: 0.4 % (ref 0–0.5)
LYMPHOCYTES # BLD AUTO: 3.08 10*3/MM3 (ref 0.7–3.1)
LYMPHOCYTES NFR BLD AUTO: 25.2 % (ref 19.6–45.3)
MCH RBC QN AUTO: 28.9 PG (ref 26.6–33)
MCHC RBC AUTO-ENTMCNC: 31 G/DL (ref 31.5–35.7)
MCV RBC AUTO: 93 FL (ref 79–97)
MONOCYTES # BLD AUTO: 0.91 10*3/MM3 (ref 0.1–0.9)
MONOCYTES NFR BLD AUTO: 7.5 % (ref 5–12)
NEUTROPHILS NFR BLD AUTO: 61.5 % (ref 42.7–76)
NEUTROPHILS NFR BLD AUTO: 7.51 10*3/MM3 (ref 1.7–7)
NRBC BLD AUTO-RTO: 0 /100 WBC (ref 0–0.2)
PLATELET # BLD AUTO: 268 10*3/MM3 (ref 140–450)
PMV BLD AUTO: 9.8 FL (ref 6–12)
POTASSIUM SERPL-SCNC: 4 MMOL/L (ref 3.5–5.2)
RBC # BLD AUTO: 4.02 10*6/MM3 (ref 3.77–5.28)
SODIUM SERPL-SCNC: 138 MMOL/L (ref 136–145)
WBC NRBC COR # BLD AUTO: 12.2 10*3/MM3 (ref 3.4–10.8)

## 2024-02-28 PROCEDURE — 94799 UNLISTED PULMONARY SVC/PX: CPT

## 2024-02-28 PROCEDURE — 97110 THERAPEUTIC EXERCISES: CPT

## 2024-02-28 PROCEDURE — 63710000001 INSULIN LISPRO (HUMAN) PER 5 UNITS: Performed by: GENERAL PRACTICE

## 2024-02-28 PROCEDURE — 94664 DEMO&/EVAL PT USE INHALER: CPT

## 2024-02-28 PROCEDURE — 85025 COMPLETE CBC W/AUTO DIFF WBC: CPT | Performed by: INTERNAL MEDICINE

## 2024-02-28 PROCEDURE — 80048 BASIC METABOLIC PNL TOTAL CA: CPT | Performed by: INTERNAL MEDICINE

## 2024-02-28 PROCEDURE — 94761 N-INVAS EAR/PLS OXIMETRY MLT: CPT

## 2024-02-28 PROCEDURE — 97116 GAIT TRAINING THERAPY: CPT

## 2024-02-28 PROCEDURE — 82948 REAGENT STRIP/BLOOD GLUCOSE: CPT

## 2024-02-28 PROCEDURE — 97530 THERAPEUTIC ACTIVITIES: CPT

## 2024-02-28 PROCEDURE — 99239 HOSP IP/OBS DSCHRG MGMT >30: CPT | Performed by: INTERNAL MEDICINE

## 2024-02-28 RX ORDER — OLANZAPINE 10 MG/1
10 TABLET, ORALLY DISINTEGRATING ORAL NIGHTLY
Qty: 30 TABLET | Refills: 0 | Status: SHIPPED | OUTPATIENT
Start: 2024-02-28

## 2024-02-28 RX ORDER — PSEUDOEPHEDRINE HCL 30 MG
100 TABLET ORAL 2 TIMES DAILY
Qty: 60 CAPSULE | Refills: 0 | Status: SHIPPED | OUTPATIENT
Start: 2024-02-28

## 2024-02-28 RX ORDER — OXYBUTYNIN CHLORIDE 10 MG/1
10 TABLET, EXTENDED RELEASE ORAL DAILY
Qty: 30 TABLET | Refills: 0 | Status: SHIPPED | OUTPATIENT
Start: 2024-02-29

## 2024-02-28 RX ORDER — BUDESONIDE AND FORMOTEROL FUMARATE DIHYDRATE 160; 4.5 UG/1; UG/1
2 AEROSOL RESPIRATORY (INHALATION)
Qty: 6 G | Refills: 0 | Status: SHIPPED | OUTPATIENT
Start: 2024-02-28

## 2024-02-28 RX ORDER — POLYETHYLENE GLYCOL 3350 17 G/17G
17 POWDER, FOR SOLUTION ORAL DAILY
Qty: 30 PACKET | Refills: 0 | Status: SHIPPED | OUTPATIENT
Start: 2024-02-29

## 2024-02-28 RX ORDER — LANOLIN ALCOHOL/MO/W.PET/CERES
3 CREAM (GRAM) TOPICAL NIGHTLY PRN
Status: DISCONTINUED | OUTPATIENT
Start: 2024-02-28 | End: 2024-02-28 | Stop reason: HOSPADM

## 2024-02-28 RX ORDER — ENOXAPARIN SODIUM 100 MG/ML
40 INJECTION SUBCUTANEOUS EVERY 24 HOURS
Qty: 3.6 ML | Refills: 0 | Status: SHIPPED | OUTPATIENT
Start: 2024-02-29 | End: 2024-03-09

## 2024-02-28 RX ADMIN — BUDESONIDE AND FORMOTEROL FUMARATE DIHYDRATE 2 PUFF: 160; 4.5 AEROSOL RESPIRATORY (INHALATION) at 08:04

## 2024-02-28 RX ADMIN — CETIRIZINE HYDROCHLORIDE 10 MG: 10 TABLET, FILM COATED ORAL at 08:37

## 2024-02-28 RX ADMIN — OXYBUTYNIN CHLORIDE 10 MG: 5 TABLET, EXTENDED RELEASE ORAL at 08:36

## 2024-02-28 RX ADMIN — HYDROCODONE BITARTRATE AND ACETAMINOPHEN 1 TABLET: 7.5; 325 TABLET ORAL at 09:12

## 2024-02-28 RX ADMIN — NICOTINE 1 PATCH: 14 PATCH, EXTENDED RELEASE TRANSDERMAL at 08:36

## 2024-02-28 RX ADMIN — FLUOXETINE HYDROCHLORIDE 40 MG: 20 CAPSULE ORAL at 08:36

## 2024-02-28 RX ADMIN — ASPIRIN 81 MG: 81 TABLET, COATED ORAL at 08:37

## 2024-02-28 RX ADMIN — INSULIN LISPRO 2 UNITS: 100 INJECTION, SOLUTION INTRAVENOUS; SUBCUTANEOUS at 11:14

## 2024-02-28 RX ADMIN — ALLOPURINOL 100 MG: 100 TABLET ORAL at 08:36

## 2024-02-28 RX ADMIN — PANTOPRAZOLE SODIUM 40 MG: 40 TABLET, DELAYED RELEASE ORAL at 06:09

## 2024-02-28 RX ADMIN — Medication 10 ML: at 08:39

## 2024-02-28 NOTE — PROGRESS NOTES
Inpatient Progress Note  Breanne Dickson  Date: 02/28/24  MRN: [id number]      Subjective:  Resting in bed. No pain to the right hip this morning. No chest pain or shortness of air. Worked with physical therapy yesterday and tolerated it well. Walked in the room this morning with assistance and tolerated it well. Family at bedside denies concerns. Daughter reports that she is planning to take her home, possibly today.  VSS. Afebrile. No acute events overnight.       Objective:      Vitals:    02/27/24 2000 02/28/24 0300 02/28/24 0620 02/28/24 0804   BP:  109/68 111/72    BP Location:  Left arm Left arm    Patient Position:  Lying Lying    Pulse: 87 92 92 101   Resp: 18 18 18 20   Temp:  97 °F (36.1 °C) 97.8 °F (36.6 °C)    TempSrc:  Axillary Oral    SpO2: 98% 97%  95%   Weight:       Height:            Physical Exam:  Constitutional:  Well-developed, well-nourished.  No acute distress.        Musculoskeletal:  Upon examination of the right hip, the surgical dressing is clean and dry. Minimal edema to the lateral hip, tissues soft. No erythema, warmth, or tenderness. (+) DF/PF at the ankle. Capillary refill is brisk and light touch sensation is intact, distally. DP pulse 2/4.     Labs:    Results from last 7 days   Lab Units 02/28/24  0307 02/27/24  0123 02/26/24  0132 02/24/24  0201 02/23/24  0201 02/22/24  1619 02/22/24  1518   CRP mg/dL  --   --   --   --   --   --  8.72*   LACTATE mmol/L  --   --   --   --   --  1.2  --    WBC 10*3/mm3 12.20* 13.91* 12.59*   < > 10.55  --  13.08*   HEMOGLOBIN g/dL 11.6* 11.7* 11.5*   < > 13.5  --  13.6   HEMATOCRIT % 37.4 36.4 36.0   < > 41.5  --  42.1   MCV fL 93.0 89.0 91.6   < > 88.5  --  89.2   MCHC g/dL 31.0* 32.1 31.9   < > 32.5  --  32.3   PLATELETS 10*3/mm3 268 281 280   < > 341  --  336   INR   --   --   --   --  1.03  --   --     < > = values in this interval not displayed.     Results from last 7 days   Lab Units 02/22/24  1420   PH, ARTERIAL pH units 7.435   PO2 ART mm  "Hg 53.6*   PCO2, ARTERIAL mm Hg 31.5*   HCO3 ART mmol/L 21.1     Results from last 7 days   Lab Units 02/28/24  0307 02/27/24  0123 02/26/24  0132 02/24/24  0201 02/23/24  0201 02/22/24  1518   SODIUM mmol/L 138 139 139   < > 140 138   POTASSIUM mmol/L 4.0 3.8 4.0   < > 3.8 4.0   CHLORIDE mmol/L 103 106 104   < > 101 103   CO2 mmol/L 22.8 22.7 23.7   < > 25.4 22.6   BUN mg/dL 14 15 14   < > 26* 27*   CREATININE mg/dL 0.74 0.90 0.93   < > 0.96 0.94   CALCIUM mg/dL 9.1 9.0 8.8   < > 9.6 9.6   GLUCOSE mg/dL 124* 135* 133*   < > 155* 91   ALBUMIN g/dL  --  3.3*  --   --  3.8 3.8   BILIRUBIN mg/dL  --  0.3  --   --  0.4 0.3   ALK PHOS U/L  --  95  --   --  84 88   AST (SGOT) U/L  --  33*  --   --  18 21   ALT (SGPT) U/L  --  18  --   --  16 18    < > = values in this interval not displayed.   Estimated Creatinine Clearance: 68.1 mL/min (by C-G formula based on SCr of 0.74 mg/dL).  No results found for: \"AMMONIA\"  Results from last 7 days   Lab Units 02/22/24  1922 02/22/24  1518   HSTROP T ng/L 50* 53*     Results from last 7 days   Lab Units 02/22/24  1518   PROBNP pg/mL 22,342.0*     No results found for: \"HGBA1C\"  Lab Results   Component Value Date    TSH 0.915 02/22/2024         Pain Management Panel           No data to display                Blood Culture   Date Value Ref Range Status   02/22/2024 No growth at 5 days  Final   02/22/2024 No growth at 5 days  Final     No results found for: \"URINECX\"  No results found for: \"WOUNDCX\"  No results found for: \"STOOLCX\"              Radiology:  Imaging Results (Last 72 Hours)       ** No results found for the last 72 hours. **              Assessment:      Status post right hip IT fracture repair, POD #4.       Plan:      Encourage mobility / ambulation.     Weight bear as tolerated to the right lower extremity.     Physical therapy / Occupational therapy for mobility and ambulation.     On Lovenox for DVT prophylaxis x 14 days.     Maintain the dressing to the right hip. "  My change as needed for saturation, no Betadine or wound cleansing needed.     Pain control.       Follow up in the office with Dr. Burton in 2 weeks for reevaluation.     Please call for any questions or concerns.       JAZMÍN Davis   February 28, 2024   08:31 EST

## 2024-02-28 NOTE — CASE MANAGEMENT/SOCIAL WORK
Discharge Planning Assessment  [facility]     Patient Name: Breanne Dickson  MRN: [id number]  Today's Date: 2/28/2024    Admit Date: 2/22/2024     Discharge Plan       Row Name 02/28/24 1146       Plan    Final Note CM received orders for RW and BSC. CM spoke with pt's daughter and she does not have a preference for DME provider. CM contacted Antoine Caban to arrange and faxed orders. DME will be delivered to pt's room. Lead RN Ashley notified.             Ariella Schmid RN

## 2024-02-28 NOTE — CASE MANAGEMENT/SOCIAL WORK
Discharge Planning Assessment  [facility]     Patient Name: Breanne Dickson  MRN: [id number]  Today's Date: 2/28/2024    Admit Date: 2/22/2024         Discharge Plan       Row Name 02/28/24 1301       Plan    Final Discharge Disposition Code 06 - home with home health care    Final Note Pt is being discharged home on this date with Physician ordered home health services. SS spoke with Pt and Pt's dtr at bedside who have decided upon home rather than SNF. Pt will be going home with her dtr Maykel at [address] [place] [address] [place] [address]. Dtr prefers [facility]. SS faxed  referral to [facility] fax [phone]. SS to provide Lead RN report number once  confirms acceptance.    13:29pm: SS provided [facility] [phone] report number. Pt's dtr to provide transportation.                    KEVIN ClfitonW

## 2024-02-28 NOTE — THERAPY TREATMENT NOTE
Acute Care - Occupational Therapy Treatment Note   [facility]     Patient Name: Breanne Dickson  : 1955  MRN: [id number]  Today's Date: 2024  Onset of Illness/Injury or Date of Surgery: 24     Referring Physician: Micheal    Admit Date: 2024       ICD-10-CM ICD-9-CM   1. Closed nondisplaced intertrochanteric fracture of right femur, initial encounter  S72.144A 820.21   2. Closed nondisplaced fracture of greater trochanter of right femur, initial encounter  S72.114A 820.20   3. Acute on chronic respiratory failure with hypoxia  J96.21 518.84     799.02   4. Hypervolemia, unspecified hypervolemia type  E87.70 276.69   5. COPD with acute exacerbation  J44.1 491.21   6. Sepsis, due to unspecified organism, unspecified whether acute organ dysfunction present  A41.9 038.9     995.91   7. Chronic obstructive pulmonary disease, unspecified COPD type  J44.9 496     Patient Active Problem List   Diagnosis    Intertrochanteric fracture of right femur     Past Medical History:   Diagnosis Date    Arthritis     Diabetes mellitus     Hyperlipidemia     Hypertension     Multiple rib fractures      Past Surgical History:   Procedure Laterality Date    FEMUR IM NAILING/RODDING Right 2024    Procedure: FEMUR INTRAMEDULLARY NAILING/RODDING;  Surgeon: Brijesh Burton MD;  Location: [facility];  Service: Orthopedics;  Laterality: Right;         OT ASSESSMENT FLOWSHEET (last 12 hours)       OT Evaluation and Treatment       Row Name 24 1135                   OT Time and Intention    Document Type therapy note (daily note)  -KR        Mode of Treatment occupational therapy  -KR        Patient Effort adequate  -KR        Comment Daughter explained that pt had been denied rehabilitation admission and she was going to take her mother home to receive  care/assist as needed. We discussed AE/DME needs in home and caregiver agreed to receive/provide all that is recommended/needed. Pt will need elevated toilet  seat, walker.  -KR           Motor Skills    Therapeutic Exercise elbow/forearm;hand  BUE AROM continues adequate for use of DME in mobility tasks.  -KR           Elbow/Forearm (Therapeutic Exercise)    Elbow/Forearm (Therapeutic Exercise) AROM (active range of motion)  -KR        Elbow/Forearm AROM (Therapeutic Exercise) supine  -KR           Hand (Therapeutic Exercise)    Hand (Therapeutic Exercise) AROM (active range of motion)  -KR           Wound 02/24/24 1033 Right anterior thigh    Wound - Properties Group Placement Date: 02/24/24  -JG Placement Time: 1033  -JG Side: Right  -JG Orientation: anterior  -JG Location: thigh  -JG    Retired Wound - Properties Group Placement Date: 02/24/24  -JG Placement Time: 1033  -JG Side: Right  -JG Orientation: anterior  -JG Location: thigh  -JG    Retired Wound - Properties Group Date first assessed: 02/24/24  -JG Time first assessed: 1033  -JG Side: Right  -JG Location: thigh  -JG       Plan of Care Review    Plan of Care Reviewed With patient;daughter  -KR           Therapy Plan Review/Discharge Plan (OT)    Anticipated Discharge Disposition (OT) home with assist  -KR                  User Key  (r) = Recorded By, (t) = Taken By, (c) = Cosigned By      Initials Name Effective Dates    Lourdes Braun RN 08/29/23 -     Brijesh Casey OT 06/16/21 -                            OT Recommendation and Plan  Planned Therapy Interventions (OT): activity tolerance training, adaptive equipment training, BADL retraining  Plan of Care Review  Plan of Care Reviewed With: patient, daughter  Plan of Care Reviewed With: patient, daughter        Time Calculation:     Therapy Charges for Today       Code Description Service Date Service Provider Modifiers Qty    53588554354  OT EVAL MOD COMPLEXITY 4 2/27/2024 Brijesh Davis OT GO 1    [id number]  OT THERAPEUTIC ACT EA 15 MIN 2/28/2024 Brijesh Davis OT GO 1                 Brijesh Davis OT  2/28/2024

## 2024-02-28 NOTE — DISCHARGE SUMMARY
[facility] DISCHARGE SUMMARY      Date of Admission: 2/22/2024    Date of Discharge:  2/28/2024    PCP: Mery Coulter DO    Admission Diagnosis:   Please see admission H&P    Discharge Diagnosis:   Acute intertrochanteric fracture of the right femur  Right gluteus medius muscle tear  SIRS  Acute urinary retention  Leukocytosis, likely reactive   Type II NSTEMI  Essential HTN  DM II, non-insulin dependent   COPD, stable without an acute exacerbation  Dementia with episodes of sundowning  Constipation     Procedures Performed:  2/24/24: Right intertrochanteric hip fracture repair with short cephalomedullary nail      Consults:   Consults       Date and Time Order Name Status Description    2/22/2024  9:44 PM Inpatient Orthopedic Surgery Consult Completed     2/22/2024  8:59 PM Hospitalist (on-call MD unless specified)                History of Present Illness:  Breanne Dickson is a 68 y.o. female who presented to [facility] ED for evaluation of worsening confusion and frequent falls. Please see admission H&P for complete details.     In the ED, CT head did not reveal any acute intracranial abnormalities. Routine labs revealed elevated troponin with delta of -3, mild leukocytosis, positive D-dimer, elevated pro-BNP and elevated CRP. CT chest PE protocol was negative for PE but did reveal fibrotic changes. X-rays revealed possible nondisplaced fracture of the right greater trochanter. MRI of the right hip revealed a right intertrochanteric fracture and edema in the right hip with right gluteus medius muscle tear.      Hospital Course  Breanne Dickson was admitted to the med/surg floor for further evaluation and treatment. PRN analgesics were made available. Orthopedic surgery was consulted for further input.     Infectious workup was negative and her leukocytosis was thought to be reactive. An echo was obtained revealing an EF of 46-50%, grade II diastolic dysfunction, moderate TR and severe pulmonary HTN. Her  troponin elevation was thought to be type II with no reported CP and she was continued on ASA and a statin in addition to metoprolol. She was noted to be hypoxic and placed on supplemental O2 via NC with subsequent improvement. She was not thought to be in an acute COPD exacerbation and was placed on an inhaler regimen. Possible chronic hypoxic respiratory failure was initially suspected.     Preoperatively, she experienced some urinary retention and a Madsen catheter was placed. She was evaluated by ortho and taken to the OR on 2/24 where she underwent repair with nailing. She tolerated the procedure well and remained clinically stable postoperatively. PT/OT were consulted and she participated with therapy. She was placed on DVT PPX with Lovenox per ortho recs. Her constipation was addressed with resolving urinary retention and the Madsen was removed with no reported retention upon removal. She experienced some sundowning with overall improvement with addition of Zyprexa. She was evaluated for possible inpatient rehab placement but placement was ultimately denied by her insurance with the denial upheld on peer to peer.     Possible short term SNF placement was discussed but she and her family ultimately preferred for her to return home as she was progressing with therapy with family very supportive in her care. She was evaluated on 2/28 and deemed medically stable for discharge. She ambulated in the hallway with staff on room air with O2 remaining in the 90's. She was prescribed Lovenox to complete a total of 14 days for DVT PPX per ortho. Any necessary DME including a RW and BSC were ordered. Home health was ordered. Instructions were given for outpatient follow up with her PCP and orthopedic surgery and a referral was placed to the COPD clinic.     Condition on Discharge:  Stable    Vital Signs  Vitals:    02/28/24 0804   BP:    Pulse: 101   Resp: 20   Temp:    SpO2: 95%       Physical Exam:  General:    Awake,  alert but confused, in no acute distress, chronically ill appearing   Heart:      Normal S1 and S2. Regular rate and rhythm. No significant murmur, rubs or gallops appreciated.   Lungs:     Respirations regular, even and unlabored. Lungs clear to auscultation B/L. No wheezes, rales or rhonchi.   Abdomen:   Soft and nontender. No guarding, rebound tenderness or  organomegaly noted. Bowel sounds present x 4.   Extremities:  No clubbing, cyanosis or edema noted.      Discharge Disposition:   home      Discharge Medications:     Discharge Medications        New Medications        Instructions Start Date   budesonide-formoterol 160-4.5 MCG/ACT inhaler  Commonly known as: SYMBICORT   2 puffs, Inhalation, 2 Times Daily - RT      docusate sodium 100 MG capsule   100 mg, Oral, 2 Times Daily      Enoxaparin Sodium 40 MG/0.4ML solution prefilled syringe syringe  Commonly known as: LOVENOX   40 mg, Subcutaneous, Every 24 Hours   Start Date: February 29, 2024     metoprolol tartrate 25 MG tablet  Commonly known as: LOPRESSOR   12.5 mg, Oral, Every 12 Hours Scheduled      OLANZapine zydis 10 MG disintegrating tablet  Commonly known as: ZyPREXA   10 mg, Translingual, Nightly      oxybutynin XL 10 MG 24 hr tablet  Commonly known as: DITROPAN-XL   10 mg, Oral, Daily   Start Date: February 29, 2024     polyethylene glycol 17 g packet  Commonly known as: MIRALAX   17 g, Oral, Daily   Start Date: February 29, 2024            Continue These Medications        Instructions Start Date   allopurinol 100 MG tablet  Commonly known as: ZYLOPRIM   100 mg, Oral, Daily      amitriptyline 100 MG tablet  Commonly known as: ELAVIL   100 mg, Oral, Nightly      aspirin 81 MG chewable tablet   81 mg, Oral, Daily      cetirizine 10 MG tablet  Commonly known as: zyrTEC   10 mg, Oral, Daily      empagliflozin 25 MG tablet tablet  Commonly known as: JARDIANCE   25 mg, Oral, Daily      ezetimibe 10 MG tablet  Commonly known as: ZETIA   10 mg, Oral, Daily       FLUoxetine 40 MG capsule  Commonly known as: PROzac   40 mg, Oral, Daily      glipizide 10 MG tablet  Commonly known as: GLUCOTROL   10 mg, Oral, Daily      HYDROcodone-acetaminophen  MG per tablet  Commonly known as: NORCO   1 tablet, Oral, Every 8 Hours PRN      ipratropium 0.02 % nebulizer solution  Commonly known as: ATROVENT   500 mcg, Nebulization, 3 Times Daily - RT      metFORMIN 1000 MG tablet  Commonly known as: GLUCOPHAGE   1,000 mg, Oral, 2 Times Daily With Meals      montelukast 10 MG tablet  Commonly known as: SINGULAIR   10 mg, Oral, Nightly      omeprazole 40 MG capsule  Commonly known as: priLOSEC   40 mg, Oral, Daily      rosuvastatin 40 MG tablet  Commonly known as: CRESTOR   40 mg, Oral, Nightly      vitamin D 1.25 MG (57391 UT) capsule capsule  Commonly known as: ERGOCALCIFEROL   50,000 Units, Oral, Weekly                 Discharge Diet:   Dietary Orders (From admission, onward)       Start     Ordered    02/24/24 1148  Diet: Regular/House Diet; Texture: Regular Texture (IDDSI 7); Fluid Consistency: Thin (IDDSI 0)  Diet Effective Now        References:    Diet Order [place]   Question Answer Comment   Diets: Regular/House Diet    Texture: Regular Texture (IDDSI 7)    Fluid Consistency: Thin (IDDSI 0)        02/24/24 1147                    Activity at Discharge:  activity as tolerated    Follow-up Appointments:  Additional Instructions for the Follow-ups that You Need to Schedule       Ambulatory Referral to Home Health   As directed      Face to Face Visit Date: 2/28/2024   Follow-up provider for Plan of Care?: I treated the patient in an acute care facility and will not continue treatment after discharge.   Follow-up provider: FRANCISCO J PATEL [[id number]]   Reason/Clinical Findings: Right hip fx s/p repair   Describe mobility limitations that make leaving home difficult: Right hip fx s/p repair   Nursing/Therapeutic Services Requested: Skilled Nursing Physical Therapy Occupational  Therapy   Skilled nursing orders: Medication education Wound care dressing/changes Cardiopulmonary assessments Neurovascular assessments COPD management   Frequency: 1 Week 1        Discharge Follow-up with PCP   As directed       Currently Documented PCP:    Mery Coulter DO    PCP Phone Number:    [phone]     Follow Up Details: Follow up with Dr. Coulter in 1 week.        Discharge Follow-up with Specified Provider: Follow up with Dr. Burton in 2 weeks.   As directed      To: Follow up with Dr. Burton in 2 weeks.               Follow-up Information       Brijesh Burton MD Follow up in 2 week(s).    Specialty: Orthopedic Surgery  Contact information:  [address] [facility]   [place] [address] [phone]               Mery Coulter DO .    Specialty: Family Medicine  Why: Follow up with Dr. Coulter in 1 week.  Contact information:  [address] [place] [address] [phone]               [facility] .    Specialty: Pulmonology  Contact information:  [address] [place] [address] [phone]                               Test Results Pending at Discharge:       The ASCVD Risk score (Jose MORRIS, et al., 2019) failed to calculate for the following reasons:    Cannot find a previous HDL lab    Cannot find a previous total cholesterol lab      Quinton Woods DO  02/28/24  11:23 EST      Time: Greater than 30 minutes spent on this discharge.

## 2024-02-28 NOTE — OUTREACH NOTE
Prep Survey      Flowsheet Row Responses   [facility] discharged [facility]? [facility]   Is LACE score < 7 ? No   Eligibility Readm Mgmt   Discharge diagnosis FEMUR INTRAMEDULLARY NAILING/RODDING   Does the patient have one of the following disease processes/diagnoses(primary or secondary)? General Surgery   Does the patient have Home health ordered? Yes   What is the Home health agency?  [facility]   Prep survey completed? Yes            Teresa MATTSON - Registered Nurse

## 2024-02-28 NOTE — DISCHARGE PLACEMENT REQUEST
"Dilia Aleman (68 y.o. Female)       Date of Birth   1955    Social Security Number       Address   [address] [facility] [address]    Home Phone   [phone]    MRN   [id number]       [facility]    Marital Status   Single                            Admission Date   24    Admission Type   Emergency    Admitting Provider   Ivon Whitmore DO    Attending Provider   Quinton Woods DO    Department, Room/Bed   [address], 3343/2S       Discharge Date       Discharge Disposition   Home or Self Care    Discharge Destination                                 Attending Provider: Quinton Woods DO    Allergies: Penicillins    Isolation: None   Infection: None   Code Status: CPR    Ht: 149.9 cm (59\")   Wt: 58.5 kg (129 lb)    Admission Cmt: None   Principal Problem: Intertrochanteric fracture of right femur [S72.141A]                   Active Insurance as of 2024       Primary Coverage       Payor Plan Insurance Group Employer/Plan Group    [facility] MEDICARE REPLACEMENT [facility] MED ADV GROUP [id number]       Payor Plan Address Payor Plan Phone Number Payor Plan Fax Number Effective Dates    [address] [address]   2024 - None Entered    [facility] [id number]         Subscriber Name Subscriber Birth Date Member ID       DILIA ALEMAN 1955 [id number]                     Emergency Contacts        (Rel.) Home Phone Work Phone Mobile Phone    Maykel Barr (Daughter) [phone] -- --    Mary Oglesby -- -- [phone]          [address] [address]  Dept. Phone:  [phone]  Dept. Fax:  [phone] Date Ordered: 2024         Patient:  Dilia Aleman MRN:  [id number]   [address]  [facility] [address] :  1955  SSN:    Phone: [phone] Sex:  F     Weight: 58.5 kg (129 lb)         Ht Readings from Last 1 Encounters:   24 149.9 cm (59\")       " "  Walker  Walker Folding with Wheels              (Order ID: [id number])    Diagnosis:  Closed nondisplaced intertrochanteric fracture of right femur, initial encounter (S72.144A [ICD-10-CM] 820.21 [ICD-9-CM])   Quantity:  1     Equipment:  Walker Folding with Wheels  Length of Need: 99 Months = Lifetime        Authorizing Provider's Phone: [phone]  Authorizing Provider:Quinton Woods DO  Authorizing Provider's NPI: [id number]  Order Entered By: Quinton Woods DO 2024 11:23 AM     Electronically signed by: Quinton Woods DO 2024 11:23 AM       [address] [address]  Dept. Phone:  [phone]  Dept. Fax:  [phone] Date Ordered: 2024         Patient:  Breanne Dickson MRN:  [id number]   [address] [facility] [address] :  1955  SSN:    Phone: [phone] Sex:  F     Weight: 58.5 kg (129 lb)         Ht Readings from Last 1 Encounters:   24 149.9 cm (59\")         Commode Chair  Bedside Commode with Fixed Arms      (Order ID: [id number])    Diagnosis:  Closed nondisplaced intertrochanteric fracture of right femur, initial encounter (S72.144A [ICD-10-CM] 820.21 [ICD-9-CM])   Quantity:  1     Type:  Bedside Commode with Fixed Arms  Length of Need: 99 Months = Lifetime        Authorizing Provider's Phone: [phone]  Authorizing Provider:Quinton Woods DO  Authorizing Provider's NPI: [id number]  Order Entered By: Quinton Woods DO 2024 11:23 AM     Electronically signed by: Quinton Woods DO 2024 11:23 AM       Discharge Summary    No notes of this type exist for this encounter.       "

## 2024-02-28 NOTE — NURSING NOTE
Attempted to call report to [facility], said due to pts insurance she was unsure if she qualified, said she was waiting on  to update.

## 2024-02-28 NOTE — SIGNIFICANT NOTE
02/28/24 0823   Post Acute Pre-Cert Documentation   Request Submitted by Facility - Type: Post Acute   Post-Acute Authorization Type Submitted: IRF   Date Post Acute Pre-Cert Completed 02/28/24   Response Received from Insurance? Denial   Action Taken by Requesting Facility: P2P Completed   Authorization Number: Case auth# [id number]   Post Acute Pre-Cert Initiated Comment [facility] Medicare Replacement left a message saying the p2p was completed, but was denied.  The medical director states the pt can be better cared for in a lower level of care such as a SNF.  If the pt or family want to do an appeal, the phone # is [phone]. Fax# [phone]. If they decide to do an appeal, use auth# [id number].

## 2024-02-28 NOTE — THERAPY TREATMENT NOTE
Acute Care - Physical Therapy Treatment Note   [place]     Patient Name: Breanne Dickson  : 1955  MRN: [id number]  Today's Date: 2024   Onset of Illness/Injury or Date of Surgery: 24  Visit Dx:     ICD-10-CM ICD-9-CM   1. Closed nondisplaced intertrochanteric fracture of right femur, initial encounter  S72.144A 820.21   2. Closed nondisplaced fracture of greater trochanter of right femur, initial encounter  S72.114A 820.20   3. Acute on chronic respiratory failure with hypoxia  J96.21 518.84     799.02   4. Hypervolemia, unspecified hypervolemia type  E87.70 276.69   5. COPD with acute exacerbation  J44.1 491.21   6. Sepsis, due to unspecified organism, unspecified whether acute organ dysfunction present  A41.9 038.9     995.91   7. Chronic obstructive pulmonary disease, unspecified COPD type  J44.9 496     Patient Active Problem List   Diagnosis    Intertrochanteric fracture of right femur     Past Medical History:   Diagnosis Date    Arthritis     Diabetes mellitus     Hyperlipidemia     Hypertension     Multiple rib fractures      Past Surgical History:   Procedure Laterality Date    FEMUR IM NAILING/RODDING Right 2024    Procedure: FEMUR INTRAMEDULLARY NAILING/RODDING;  Surgeon: Brijesh Burton MD;  Location: [facility];  Service: Orthopedics;  Laterality: Right;     PT Assessment (last 12 hours)       PT Evaluation and Treatment       Row Name 24 1448          Physical Therapy Time and Intention    Subjective Information no complaints  -HC     Document Type therapy note (daily note)  -HC     Mode of Treatment physical therapy  -HC     Patient Effort good  -     Comment Pt in agreement for PT. Pt was walking with daugther at start of Rx. Pt walked 100' x 2 with RW CGA. Sitting exercises completed until tolerance.  -HC       Row Name 24 1449          General Information    Patient Profile Reviewed yes  -HC     Existing Precautions/Restrictions fall  -HC      Limitations/Impairments safety/cognitive  -HC       Row Name 02/28/24 1448          Pain Scale: FACES Pre/Post-Treatment    Pain: FACES Scale, Pretreatment 2-->hurts little bit  -     Posttreatment Pain Rating 4-->hurts little more  -HC       Row Name 02/28/24 1448          Cognition    Affect/Mental Status (Cognition) confused  -     Orientation Status (Cognition) oriented to;person  -     Follows Commands (Cognition) follows one-step commands;repetition of directions required;verbal cues/prompting required  -     Personal Safety Interventions fall prevention program maintained;gait belt;nonskid shoes/slippers when out of bed;supervised activity  -[facility] Name 02/28/24 1448          Vision Assessment/Intervention    Visual Impairment/Limitations corrective lenses full-time  -HC       Row Name 02/28/24 1448          Mobility    Extremity Weight-bearing Status right lower extremity  -     Right Lower Extremity (Weight-bearing Status) weight-bearing as tolerated (WBAT)  -HC       Row Name 02/28/24 1448          Bed Mobility    Comment, (Bed Mobility) OOB  -HC       Row Name 02/28/24 1448          Transfers    Transfers sit-stand transfer;stand-sit transfer;stand pivot/stand step transfer  -HC       Row Name 02/28/24 1448          Sit-Stand Transfer    Sit-Stand [place] (Transfers) verbal cues;nonverbal cues (demo/gesture);minimum assist (75% patient effort)  -     Assistive Device (Sit-Stand Transfers) walker, front-wheeled  -HC       Row Name 02/28/24 1448          Stand-Sit Transfer    Stand-Sit [place] (Transfers) minimum assist (75% patient effort);nonverbal cues (demo/gesture);verbal cues  -     Assistive Device (Stand-Sit Transfers) walker, front-wheeled  -HC       Row Name 02/28/24 1448          Stand Pivot/Stand Step Transfer    Stand Pivot/Stand Step [place] (Transfers) verbal cues;nonverbal cues (demo/gesture);minimum assist (75% patient effort)  -     Assistive Device  (Stand Pivot Stand Step Transfer) walker, front-wheeled  -HC       Row Name 02/28/24 1448          Gait/Stairs (Locomotion)    Gait/Stairs Locomotion gait/ambulation independence;gait/ambulation assistive device;distance ambulated;gait pattern;gait deviations;maintains weight-bearing status  -     [place] Level (Gait) verbal cues;nonverbal cues (demo/gesture);contact guard  -     Assistive Device (Gait) walker, front-wheeled  -     Distance in Feet (Gait) 100' x 2  -HC     Pattern (Gait) step-to  -HC     Deviations/Abnormal Patterns (Gait) antalgic;gait speed decreased;weight shifting decreased  -       Row Name 02/28/24 1448          Safety Issues, Functional Mobility    Impairments Affecting Function (Mobility) balance;cognition;endurance/activity tolerance;pain;strength  -       Row Name 02/28/24 1448          Motor Skills    Therapeutic Exercise other (see comments)  Sitting: AP, LAQ, March, knees in/out  -       Row Name             Wound 02/24/24 1033 Right anterior thigh    Wound - Properties Group Placement Date: 02/24/24  -JG Placement Time: 1033  -JG Side: Right  -JG Orientation: anterior  -JG Location: thigh  -JG    Retired Wound - Properties Group Placement Date: 02/24/24  -JG Placement Time: 1033  -JG Side: Right  -JG Orientation: anterior  -JG Location: thigh  -JG    Retired Wound - Properties Group Date first assessed: 02/24/24  -JG Time first assessed: 1033  -JG Side: Right  -JG Location: thigh  -JG      Row Name 02/28/24 1448          Coping    Observed Emotional State calm;cooperative  -     Verbalized Emotional State acceptance  -     Family/Support Persons daughter  -HC     Involvement in Care at bedside;attentive to patient;participating in care  -       Row Name 02/28/24 1448          Positioning and Restraints    Pre-Treatment Position other (comment)  -     Post Treatment Position chair  -HC     In Chair sitting;call light within reach;encouraged to call for  assist;exit alarm on;notified ns  -               User Key  (r) = Recorded By, (t) = Taken By, (c) = Cosigned By      Initials Name Provider Type    Lourdes Braun, RN Registered Nurse    Ivana Phan PTA Physical Therapist Assistant                    Physical Therapy Education       Title: PT OT SLP Therapies (Resolved)       Topic: Physical Therapy (Resolved)       Point: Mobility training (Resolved)       Learning Progress Summary             Patient Acceptance, E,D, NR by  at 2/26/2024 1120   Family Acceptance, E,D, NR by AG at 2/26/2024 1120                         Point: Home exercise program (Resolved)       Learning Progress Summary             Patient Acceptance, E,D, NR by  at 2/26/2024 1120   Family Acceptance, E,D, NR by  at 2/26/2024 1120                         Point: Body mechanics (Resolved)       Learning Progress Summary             Patient Acceptance, E,D, NR by  at 2/26/2024 1120   Family Acceptance, E,D, NR by  at 2/26/2024 1120                         Point: Precautions (Resolved)       Learning Progress Summary             Patient Acceptance, E,D, NR by  at 2/26/2024 1120   Family Acceptance, E,D, NR by  at 2/26/2024 1120                                         User Key       Initials Effective Dates Name Provider Type [facility] 06/16/21 -  Ijeoma Lutz, PT Physical Therapist PT                  PT Recommendation and Plan  Anticipated Discharge Disposition (PT): inpatient rehabilitation facility          Time Calculation:    PT Charges       Row Name 02/28/24 1453             Time Calculation    PT Received On 02/28/24  -         Time Calculation- PT    Total Timed Code Minutes- PT 23 minute(s)  -                User Key  (r) = Recorded By, (t) = Taken By, (c) = Cosigned By      Initials Name Provider Type     Ivana Murillo, ALEKSANDRA Physical Therapist Assistant                  Therapy Charges for Today       Code Description Service Date  Service Provider Modifiers Qty    40575451181 HC GAIT TRAINING EA 15 MIN 2/27/2024 Ivana Murillo, PTA GP, CQ 1    51951152382 HC PT THER PROC EA 15 MIN 2/27/2024 Ivana Murillo, ALEKSANDRA GP, CQ 1    40761584965 HC GAIT TRAINING EA 15 MIN 2/28/2024 Ivana Murillo, ALEKSANDRA GP, CQ 1    12869367268 HC PT THER PROC EA 15 MIN 2/28/2024 Ivana Murillo, PTA GP, CQ 1            PT G-Codes  AM-PAC 6 Clicks Score (PT): 18    Ivana Murillo, ALEKSANDRA  2/28/2024

## 2024-02-28 NOTE — DISCHARGE PLACEMENT REQUEST
"Dilia Aleman (68 y.o. Female)       Date of Birth   1955    Social Security Number       Address   [address] [facility] [address]    Home Phone   [phone]    MRN   [id number]       [facility]    Marital Status   Single                            Admission Date   24    Admission Type   Emergency    Admitting Provider   Ivon Whitmore DO    Attending Provider   Quinton Woods DO    Department, Room/Bed   [address], 3343/2S       Discharge Date       Discharge Disposition   Home or Self Care    Discharge Destination                                 Attending Provider: Quinton Woods DO    Allergies: Penicillins    Isolation: None   Infection: None   Code Status: CPR    Ht: 149.9 cm (59\")   Wt: 58.5 kg (129 lb)    Admission Cmt: None   Principal Problem: Intertrochanteric fracture of right femur [S72.141A]                   Active Insurance as of 2024       Primary Coverage       Payor Plan Insurance Group Employer/Plan Group    [place] [employer] MEDICARE REPLACEMENT [facility] MED ADV GROUP [id number]       Payor Plan Address Payor Plan Phone Number Payor Plan Fax Number Effective Dates    [address] [address]   2024 - None Entered    [facility] [id number]         Subscriber Name Subscriber Birth Date Member ID       DILIA ALEMAN 1955 [id number]                     Emergency Contacts        (Rel.) Home Phone Work Phone Mobile Phone    Maykel Barr (Daughter) [phone] -- --    Mary Oglesby -- -- [phone]          [address] [address]  Phone:  [phone]  Fax:  [phone] Date: 2024      Ambulatory Referral to Home Health     Patient:  Dilia Aleman MRN:  [id number]   [address]  [facility] [address] :  1955  SSN:    Phone: [phone] Sex:  F      INSURANCE PAYOR PLAN GROUP # SUBSCRIBER ID   Primary:    [facility] MEDICARE " REPLACEMENT [id number]      Referring Provider Information:  JACK GORMAN Phone: [phone] Fax: [phone]       Referral Information:   # Visits:  999 Referral Type: Home Health [42]   Urgency:  Routine Referral Reason: Specialty Services Required   Start Date: Feb 28, 2024 End Date:  To be determined by Insurer   Diagnosis: Closed nondisplaced intertrochanteric fracture of right femur, initial encounter (S72.144A [ICD-10-CM] 820.21 [ICD-9-CM])      Refer to Dept:   Refer to Provider:   Refer to Provider Phone:   Refer to Facility:       Face to Face Visit Date: 2/28/2024  Follow-up provider for Plan of Care? I treated the patient in an acute care facility and will not continue treatment after discharge.  Follow-up provider: FRANCISCO J PATEL [[id number]]  Reason/Clinical Findings: Right hip fx s/p repair  Describe mobility limitations that make leaving home difficult: Right hip fx s/p repair  Nursing/Therapeutic Services Requested: Skilled Nursing  Nursing/Therapeutic Services Requested: Physical Therapy  Nursing/Therapeutic Services Requested: Occupational Therapy  Skilled nursing orders: Medication education  Skilled nursing orders: Wound care dressing/changes  Skilled nursing orders: Cardiopulmonary assessments  Skilled nursing orders: Neurovascular assessments  Skilled nursing orders: COPD management  Frequency: 1 Week 1     This document serves as a request of services and does not constitute Insurance authorization or approval of services.  To determine eligibility, please contact the members Insurance carrier to verify and review coverage.     If you have medical questions regarding this request for services. Please contact [address] at [phone] during normal business hours.        Authorizing Provider:Jack Gorman DO  Authorizing Provider's NPI: [id number]  Order Entered By: Jack Gorman DO 2/28/2024 11:23 AM     Electronically signed  "by: Quinton Woods DO 2024 11:23 AM          History & Physical        Peyton Mariscal PA-C at 24       Attestation signed by Ivon Whitmore DO at 24 [phone]    I have reviewed this documentation and agree.                       [facility]  HISTORY & PHYSICAL    Patient Identification:  Name:  Breanne Dickson  Age:  68 y.o.  Sex:  female  :  1955  MRN:  [id number]   Visit Number:  [id number]  Admit Date: 2024   Primary Care Physician:  Mery Coulter DO     Subjective     Chief complaint:   Chief Complaint   Patient presents with    Altered Mental Status     History of presenting illness:   Patient is a 68 y.o. female with past medical history significant for arthritis, diabetes, hypertension, hyperlipidemia, Alzheimer's dementia that presented to the [facility] emergency department for evaluation of altered mental status.     Patient examined at bedside on 3 N. Daughter states the patient has had increasing confusion and frequent falls recently.  Patient was supposed to move in with her daughter today, however since her fall on Tuesday, she has had difficulty walking and has been complaining of right hip pain.  They note the patient has been off her Alzheimer medication for the past month due to GI symptoms that they thought were related to this.  Patient's daughter states that patient lives in a small guest house on their property.  He had a camera to monitor/check in on her.  They state that the patient had a couple falls on Tuesday night and appeared right she \"blacked out\" after standing up, causing her to fall.  Daughter notes they had stopped Trulicity and other diabetic medications due to episodes of hypoglycemia.  She states that she believes they also changed her blood pressure medications after this as well due to hypotension.  Patient is pleasantly confused on my exam.She has no complaints and no hip pain " at this time.  She is oriented to self and month but not year, location, or president.  Patient had to be reminded several times that she did break her hip.  Daughter denies any known diagnosis of CHF.  She believes the patient was on Lasix several years ago.  Denies any known issues with swelling or orthopnea.  She states the patient does have shortness of breath due to smoking.  Patient adamantly denied any tobacco abuse, however, the daughter reminded the patient that she did smoke a cigarette on the way here.    Upon arrival to the ED, vitals were temperature 38.7, , RR 17, /72, SpO2 93% on room air.  Labs significant for initial troponin 53, repeat 50.  proBNP 22,342.  BUN 27, BUN/creatinine ratio 28.7.  D-dimer 1.28.  CRP 8.72.  WBC 13.08 CT head with no acute intracranial process.  CT pelvis/lower extremity shows fracture of right greater trochanter angiogram of left chest shows no PE, fibrotic lung changes.  CT abdomen pelvis showed no acute intra-abdominal/pelvic process.MRI Right hip shows right intertrochanteric fracture.  Edema of right hip with tear of right gluteus medius muscle.    Patient has been admitted to the MedSur unit.  ---------------------------------------------------------------------------------------------------------------------   Review of Systems   Unable to perform ROS: Dementia      ---------------------------------------------------------------------------------------------------------------------   Past Medical History:   Diagnosis Date    Arthritis     Diabetes mellitus     Hyperlipidemia     Hypertension     Multiple rib fractures 2000     History reviewed. No pertinent surgical history.  Family History   Problem Relation Age of Onset    Breast cancer Neg Hx      Social History     Socioeconomic History    Marital status: Single   Tobacco Use    Smoking status: Every Day     Packs/day: 0.25     Years: 40.00     Additional pack years: 0.00     Total pack years: 10.00      Types: Electronic Cigarette, Cigarettes   Vaping Use    Vaping Use: Never used   Substance and Sexual Activity    Alcohol use: No    Drug use: No    Sexual activity: Defer     ---------------------------------------------------------------------------------------------------------------------   Allergies:  Penicillins  ---------------------------------------------------------------------------------------------------------------------   Medications below are reported home medications pulling from within the system; at this time, these medications have not been reconciled unless otherwise specified and are in the verification process for further verifcation as current home medications.    Prior to Admission Medications       Prescriptions Last Dose Informant Patient Reported? Taking?    acetaminophen-codeine (TYLENOL #3) 300-30 MG per tablet   No No    Take 1 tablet by mouth Every 6 (Six) Hours As Needed for Moderate Pain .    aspirin 81 MG chewable tablet   Yes No    Chew 81 mg Daily.    cyclobenzaprine (FLEXERIL) 10 MG tablet   No No    Take 1 tablet by mouth 2 (Two) Times a Day As Needed for Muscle Spasms.    FLUoxetine (PROzac) 20 MG capsule   Yes No    Take 40 mg by mouth Daily.    indomethacin (INDOCIN) 50 MG capsule   No No    Take 1 capsule by mouth 3 (Three) Times a Day With Meals.    LISINOPRIL PO   Yes No    Take  by mouth.    metFORMIN (GLUCOPHAGE) 500 MG tablet   Yes No    Take 500 mg by mouth 2 (Two) Times a Day With Meals.    methylPREDNISolone (MEDROL, JORGE,) 4 MG tablet   No No    Take as directed on package instructions.    SIMVASTATIN PO   Yes No    Take  by mouth.          ---------------------------------------------------------------------------------------------------------------------    Objective     Hospital Scheduled Meds:  [START ON 2/23/2024] nicotine, 1 patch, Transdermal, Q24H  sodium chloride, 10 mL, Intravenous, Q12H           Current listed hospital scheduled medications may not  yet reflect those currently placed in orders that are signed and held, awaiting patient's arrival to floor/unit.    ---------------------------------------------------------------------------------------------------------------------   Vital Signs:  Temp:  [98 °F (36.7 °C)-98.7 °F (37.1 °C)] 98 °F (36.7 °C)  Heart Rate:  [] 98  Resp:  [17-18] 18  BP: (105-140)/(72-94) 108/80  Mean Arterial Pressure (Non-Invasive) for the past 24 hrs (Last 3 readings):   Noninvasive MAP (mmHg)   02/22/24 2154 93   02/22/24 2115 88   02/22/24 2045 94     SpO2 Percentage    02/22/24 2045 02/22/24 2114 02/22/24 2154   SpO2: 96% 96% 94%     SpO2:  [93 %-97 %] 94 %  on  Flow (L/min):  [1] 1;   Device (Oxygen Therapy): nasal cannula    Body mass index is 26.05 kg/m².  Wt Readings from Last 3 Encounters:   02/22/24 58.5 kg (129 lb)   05/27/19 75.8 kg (167 lb)   12/23/17 79.4 kg (175 lb)     ---------------------------------------------------------------------------------------------------------------------   Physical Exam:  Physical Exam  Constitutional:       General: She is not in acute distress.     Appearance: Normal appearance.   HENT:      Head: Normocephalic and atraumatic.      Right Ear: External ear normal.      Left Ear: External ear normal.      Nose: No nasal deformity.      Mouth/Throat:      Lips: Pink.      Mouth: Mucous membranes are moist.   Eyes:      Conjunctiva/sclera: Conjunctivae normal.      Pupils: Pupils are equal, round, and reactive to light.   Cardiovascular:      Rate and Rhythm: Normal rate and regular rhythm.      Pulses:           Dorsalis pedis pulses are 2+ on the right side and 2+ on the left side.      Heart sounds: Normal heart sounds.   Pulmonary:      Effort: Pulmonary effort is normal.      Breath sounds: Normal breath sounds. No wheezing, rhonchi or rales.   Abdominal:      General: Abdomen is flat. Bowel sounds are normal.      Palpations: Abdomen is soft.      Tenderness: There is no  guarding or rebound.   Genitourinary:     Comments: No hayes catheter in place   Musculoskeletal:      Cervical back: Neck supple. Normal range of motion.      Right hip: No deformity, tenderness or bony tenderness. Normal range of motion.      Left hip: No deformity, tenderness or bony tenderness. Normal range of motion.      Right lower leg: No edema.      Left lower leg: No edema.   Skin:     General: Skin is warm and dry.   Neurological:      General: No focal deficit present.      Mental Status: She is alert. Mental status is at baseline.      Comments: Oriented only to self and month   Psychiatric:         Mood and Affect: Mood normal.         Behavior: Behavior normal.       ---------------------------------------------------------------------------------------------------------------------  EKG: Tachycardia, heart rate 104.  Awaiting formal cardiology read        I have personally reviewed the EKG/Telemetry strip  ---------------------------------------------------------------------------------------------------------------------   Results from last 7 days   Lab Units 02/22/24  1922 02/22/24  1518   HSTROP T ng/L 50* 53*     Results from last 7 days   Lab Units 02/22/24  1518   PROBNP pg/mL 22,342.0*       Results from last 7 days   Lab Units 02/22/24  1420   PH, ARTERIAL pH units 7.435   PO2 ART mm Hg 53.6*   PCO2, ARTERIAL mm Hg 31.5*   HCO3 ART mmol/L 21.1     Results from last 7 days   Lab Units 02/22/24  1619 02/22/24  1518   CRP mg/dL  --  8.72*   LACTATE mmol/L 1.2  --    WBC 10*3/mm3  --  13.08*   HEMOGLOBIN g/dL  --  13.6   HEMATOCRIT %  --  42.1   MCV fL  --  89.2   MCHC g/dL  --  32.3   PLATELETS 10*3/mm3  --  336     Results from last 7 days   Lab Units 02/22/24  1518   SODIUM mmol/L 138   POTASSIUM mmol/L 4.0   CHLORIDE mmol/L 103   CO2 mmol/L 22.6   BUN mg/dL 27*   CREATININE mg/dL 0.94   CALCIUM mg/dL 9.6   GLUCOSE mg/dL 91   ALBUMIN g/dL 3.8   BILIRUBIN mg/dL 0.3   ALK PHOS U/L 88   AST  "(SGOT) U/L 21   ALT (SGPT) U/L 18   Estimated Creatinine Clearance: 53.6 mL/min (by C-G formula based on SCr of 0.94 mg/dL).  No results found for: \"AMMONIA\"    No results found for: \"HGBA1C\", \"POCGLU\"  No results found for: \"HGBA1C\"  Lab Results   Component Value Date    TSH 0.915 02/22/2024       No results found for: \"BLOODCX\"  No results found for: \"URINECX\"  No results found for: \"WOUNDCX\"  No results found for: \"STOOLCX\"  No results found for: \"RESPCX\"  No results found for: \"MRSACX\"  Pain Management Panel           No data to display              I have personally reviewed the above laboratory results.   ---------------------------------------------------------------------------------------------------------------------  Imaging Results (Last 7 Days)       Procedure Component Value Units Date/Time    MRI Hip Right Without Contrast [[id number]] Collected: 02/22/24 1904     Updated: 02/22/24 2003    Narrative:      Comparison: None     Fracture right intertrochanter which involves the right greater  trochanter. No dislocation is seen. Left hip is intact. Soft tissue  edema surrounding right hip. No significant joint effusion is seen. Tear  of the right gluteus medius muscle at the myotendinous junction.       Impression:      Impression:  1. Right intertrochanteric fracture.  2. Edema right hip with tear the right gluteus medius muscle.        This report was finalized on 2/22/2024 7:08 PM by Ric Bowman DO.       CT Abdomen Pelvis With Contrast [[id number]] Collected: 02/22/24 1651     Updated: 02/22/24 1655    Narrative:      PROCEDURE: CT ABDOMEN PELVIS W CONTRAST-     HISTORY:  sepsis of unknown etiology; S72.114A-Nondisplaced fracture of  greater trochanter of right femur, initial encounter for closed  fracture; J96.01-Acute respiratory failure with hypoxia     COMPARISON:  None .     TECHNIQUE: Multiple axial CT images were obtained from the lung bases  through the pubic symphysis following the " administration of Isovue 300  contrast. This study was performed with techniques to keep radiation  doses as low as reasonably achievable (ALARA). Individualized dose  reduction techniques using automated exposure control or adjustment of  mA and/or kV according to the patient size were employed.     FINDINGS:     ABDOMEN: The lung bases are clear. The heart is normal in size. The  liver is normal in attenuation with no focal lesions. The spleen is  homogenous. No adrenal mass is present. The pancreas is unremarkable.  The kidneys are normal. The aorta is normal in caliber. The mesenteric  vascualture is patent. There is no free fluid or adenopathy. There is a  moderate sized hiatal hernia. There is no evidence of a small bowel  obstruction.     PELVIS: The appendix is not identified. The patient is status post  hysterectomy. The urinary bladder is unremarkable. There is no  significant free fluid or adenopathy. Bone windows redemonstrate a  nondisplaced fracture of the right greater trochanter. There are diffuse  degenerative changes within the lumbar spine with grade 1  anterolisthesis of L4 on L5 the extraperitoneal soft tissues are  unremarkable.       Impression:      No acute intra-abdominal or pelvic process.        This report was finalized on 2/22/2024 4:53 PM by Kayla Canales M.D..       CT Angiogram Chest Pulmonary Embolism [[id number]] Collected: 02/22/24 1649     Updated: 02/22/24 1652    Narrative:      PROCEDURE: CT ANGIOGRAM CHEST PULMONARY EMBOLISM-     HISTORY: Pulmonary embolism (PE) suspected, positive D-dimer;  S72.114A-Nondisplaced fracture of greater trochanter of right femur,  initial encounter for closed fracture; J96.01-Acute respiratory failure  with hypoxia     COMPARISON: None .     TECHNIQUE: Multiple axial CT images were obtained from the thoracic  inlet through the upper abdomen following the administration of Isovue  300 per the CT PE protocol. Coronal and oblique 3D MIP images  were  reconstructed from the original axial data set. This study was performed  with techniques to keep radiation doses as low as reasonably achievable  (ALARA). Individualized dose reduction techniques using automated  exposure control or adjustment of mA and/or kV according to the patient  size were employed.     FINDINGS: There are no filling defects within the pulmonary arteries to  suggest an embolus. The thoracic aorta is normal in caliber the heart is  normal in size. There are no pleural or pericardial effusions. There is  no adenopathy. There is a moderate sized hiatal hernia. Lung windows  reveal fibrotic lung changes. The visualized upper abdomen is  unremarkable. Bone windows reveal no acute osseous abnormalities.       Impression:      1. No evidence of pulmonary embolus.  2. Fibrotic lung changes.              This report was finalized on 2/22/2024 4:50 PM by Kayla Canales M.D..       CT Lower Extremity Right Without Contrast [[id number]] Collected: 02/22/24 1534     Updated: 02/22/24 1537    Narrative:      PROCEDURE: CT LOWER EXTREMITY RIGHT WO CONTRAST-     HISTORY: right hip pain s/p fall     COMPARISON: None .     PROCEDURE: Axial images were obtained through the right hip by computed  tomography. Sagittal and coronal reconstruction images were performed.  This study was performed with techniques to keep radiation doses as low  as reasonably achievable (ALARA). Individualized dose reduction  techniques using automated exposure control or adjustment of mA and/or  kV according to the patient size were employed.         FINDINGS: There is a fracture of the right greater trochanter. No other  fractures or dislocations are evident. The joint spaces are preserved.  The soft tissues are unremarkable.       Impression:      Fracture of the right greater trochanter.              This report was finalized on 2/22/2024 3:35 PM by Kayla Canales M.D..       CT Pelvis Without Contrast [[id number]]  Collected: 02/22/24 1533     Updated: 02/22/24 1536    Narrative:      PROCEDURE: CT PELVIS WO CONTRAST-     HISTORY: pain s/p fall     COMPARISON: None.     PROCEDURE: Axial images were obtained through the pelvis using bone  window algorithms. Coronal and sagittal images were reformatted. This  study was performed with techniques to keep radiation doses as low as  reasonably achievable (ALARA). Individualized dose reduction techniques  using automated exposure control or adjustment of mA and/or kV according  to the patient size were employed.     FINDINGS:     PELVIS: There is a fracture of the right greater trochanter. No other  fractures or dislocations are evident. The pubic symphysis and SI joints  are intact. The soft tissues are unremarkable.       Impression:      Fracture of the right greater trochanter.              This report was finalized on 2/22/2024 3:34 PM by Kayla Canales M.D..       CT Head Without Contrast [[id number]] Collected: 02/22/24 1442     Updated: 02/22/24 1444    Narrative:      PROCEDURE: CT HEAD WO CONTRAST-     HISTORY: AMS; frequent falls     COMPARISON:  None .     TECHNIQUE: Multiple axial CT images were performed from the foramen  magnum to the vertex without enhancement. This study was performed with  techniques to keep radiation doses as low as reasonably achievable  (ALARA). Individualized dose reduction techniques using automated  exposure control or adjustment of mA and/or kV according to the patient  size were employed.     FINDINGS: There is generalized cerebral volume loss. Patchy  hypodensities in the periventricular white matter consistent with  chronic small vessel ischemic change. There is no CT evidence of  hemorrhage. There is no mass, mass effect or midline shift.  There is no  hydrocephalus. The paranasal sinuses are clear. Bone windows reveal no  acute osseous abnormalities.       Impression:      No acute intracranial process.              This report was  finalized on 2/22/2024 2:42 PM by Kayla Canales M.D..       XR Hip With or Without Pelvis 2 - 3 View Right [[id number]] Collected: 02/22/24 1440     Updated: 02/22/24 1444    Narrative:      PROCEDURE: XR HIP W OR WO PELVIS 2-3 VIEW RIGHT-     HISTORY: pain s/p fall     COMPARISON: None.     FINDINGS: A possible nondisplaced fracture of the right greater  trochanter. The joint spaces are preserved. The pubic symphysis and SI  joints are intact. The soft tissues are unremarkable.       Impression:      Possible nondisplaced fracture of the right greater  trochanter. CT of the right hip may prove useful in further evaluation              This report was finalized on 2/22/2024 2:41 PM by Kayla Canales M.D..       XR Chest 1 View [[id number]] Collected: 02/22/24 1439     Updated: 02/22/24 1442    Narrative:      PROCEDURE: XR CHEST 1 VW-       HISTORY: wheezing     COMPARISON: 12/23/2017.     FINDINGS: The heart is normal in size. The mediastinum is unremarkable.  There is a reticular lung pattern bilaterally which is new compared to  the prior exam. There are no effusions. There is no pneumothorax. There  are no acute osseous abnormalities.       Impression:      Fibrotic lung changes with no acute cardiopulmonary process.        This report was finalized on 2/22/2024 2:40 PM by Kayla Canales M.D..             I have personally reviewed the above radiology results.     Last Echocardiogram:    ---------------------------------------------------------------------------------------------------------------------    Assessment & Plan      Active Hospital Problems    Diagnosis  POA    **Intertrochanteric fracture of right femur [S72.141A]  Yes     Intertrochanteric fracture right femur, POA  Right gluteus media muscle tear, POA  Frequent falls, POA  Right intertrochanteric hip fracture seen on CT and MRI.  Patient orthopedic surgery consult, assistance appreciated  Pain management  N.p.o. at  midnight  Frequent falls due to orthostasis given description/history per daughter.  Will hold any home antihypertensive agents unless BP gets significantly elevated.  Can do orthostatic vitals after patient has had procedure and doing PT.  SIRS, POA  Patient met SIRS criteria with WBC greater than 12 and heart rate greater than 90.  Obvious source of infection at this time.  Continue monitor vital signs.  Suspect inflammatory post fracture/muscle tear  Suspected chronic CHF, POA  Family reports no known diagnosis of CHF.  Patient does not appear to be in any sort of exacerbation at this time, no signs of volume overload.  However BNP was significantly elevated.  Patient received a liter of fluids and 80 mg of Lasix in the ED.  No echo on file for review.  Will obtain TTE preoperatively in the a.m.  CHRONIC MEDICAL PROBLEMS    Type II DM  Hold any oral hypoglycemics to prevent hypoglycemia. Will review home diabetes medications once available per pharmacy.   Hypoglycemia protocol in place if necessary.   AccuCheks before meals and at bedtime.  Essential hypertension  BP appears well controlled.  Due to possible orthostasis, hold home antihypertensives unless BP becomes significantly/consistently elevated.  Hyperlipidemia   Restart home aspirin and statin pending med rec   Ongoing tobacco abuse   Strongly encourage cessation   NRT available     F/E/N: Oral hydration.  Continue monitor electrolytes.  N.p.o. at midnight.    ---------------------------------------------------  DVT Prophylaxis: SCDs  Activity: Bedrest    The patient is considered to be a high risk patient due to: Right hip fracture    INPATIENT status due to the need for care which can only be reasonably provided in an hospital setting such as aggressive/expedited ancillary services and/or consultation services, the necessity for IV medications, close physician monitoring and/or the possible need for procedures.  In such, I feel patient’s risk for  adverse outcomes and need for care warrant INPATIENT evaluation and predict the patient’s care encounter to likely last beyond 2 midnights.      Code Status: Full code    Disposition/Discharge planning: Pending clinical course, will need rehab postop    I have discussed the patient's assessment and plan with Dr. Whitmore.      Peyton Mariscal PA-C  Hospitalist Service -- [facility]       02/22/24  22:24 EST    Attending Physician: Ivon Whitmore DO        Electronically signed by Ivon Whitmore DO at 02/23/24 0652          Operative/Procedure Notes (most recent note)        Brijesh Burton MD at 02/24/24 0949          Breanne Randolph  2/24/2024      Operative Note:    Surgeon: Brijesh Burton MD    Assistant: GUERRERO Peters    Preoperative Diagnosis:   Right intertrochanteric hip fracture, nondisplaced    Postoperative Diagnosis:   Same    Procedure(s):    1.  Right intertrochanteric hip fracture repair with short cephalomedullary nail, CPT code 89112  2.  Intraoperative use of fluoroscopy, CPT code 22854    Anesthesia: General    Estimated Blood Loss: 50 cc    Specimen Obtained:  None    Complication(s):  None apparent.     Implants: Synthes short cephalomedullary nail, 130 degrees, 10 mm, 95 mm lag screw, 36 mm static locking screw    Operative Indication:     the patient is a 68-year-old who fell and sustained the above injury.  She is a community ambulator without any assistive devices.  Due to the acute nature of her injury she elected to proceed with the above operation.    Operative Details:    The patient was met in the preoperative suite where the correct operative site was marked. The patient was brought to the operating room where anesthesia was initiated. The patient was positioned appropriately with all bony prominences well-padded. The patient was prepped and draped in the usual sterile fashion and prior to incision a timeout was observed to verify the correct operative site, procedure and  antibiotics.    A longitudinal incision on the proximal lateral aspect of thigh was taken down to the greater trochanter.  A guidepin was utilized to gain access to the medullary canal and advanced down to the level of the lesser.  Once checking the AP and lateral projections and entry reamer was utilized.  11 mm reamer was utilized to sound the canal.  11 reamer was removed and a 10 mm nail was malleted into position.  A second incision was then made to place a guidepin in the center center position of the femoral head.  Once this was measured a reamer and a lag screw was then placed.  A setscrew was placed proximally.  Then x-ray was directed to the mid thigh where a static locking screw was placed with the aid of intraoperative fluoroscopy.  Final intraoperative x-rays show satisfactory reduction and placement of the hardware.  All wounds were irrigated thoroughly with sterile saline.  The wound was closed in standard layered fashion.  A soft dressing was applied.  Patient was extubated, transfer hospital bed in the PACU stable condition.  Patient tolerated procedure well without a complication.        Postoperative Plan:    Transfer to floor  Dressing-maintain dressing for now, okay to reinforce as needed saturation  Weight Bear/Lifting Status-as tolerated  DVT PPx-Lovenox 40 mg once daily for 14 days  Follow up-2 weeks in the office    Electronically Signed by: Brijesh Burton MD        Electronically signed by Brijesh Burton MD at 02/24/24 1138          Physician Progress Notes (most recent note)        Harrison Abraham APRN at 02/28/24 0815       Summary:Status post right hip IT fracture repair, POD #4.                 Inpatient Progress Note  Breanne Randolph  Date: 02/28/24  MRN: [id number]      Subjective:  Resting in bed. No pain to the right hip this morning. No chest pain or shortness of air. Worked with physical therapy yesterday and tolerated it well. Walked in the room this morning with assistance and  tolerated it well. Family at bedside denies concerns. Daughter reports that she is planning to take her home, possibly today.  VSS. Afebrile. No acute events overnight.       Objective:      Vitals:    02/27/24 2000 02/28/24 0300 02/28/24 0620 02/28/24 0804   BP:  109/68 111/72    BP Location:  Left arm Left arm    Patient Position:  Lying Lying    Pulse: 87 92 92 101   Resp: 18 18 18 20   Temp:  97 °F (36.1 °C) 97.8 °F (36.6 °C)    TempSrc:  Axillary Oral    SpO2: 98% 97%  95%   Weight:       Height:            Physical Exam:  Constitutional:  Well-developed, well-nourished.  No acute distress.        Musculoskeletal:  Upon examination of the right hip, the surgical dressing is clean and dry. Minimal edema to the lateral hip, tissues soft. No erythema, warmth, or tenderness. (+) DF/PF at the ankle. Capillary refill is brisk and light touch sensation is intact, distally. DP pulse 2/4.     Labs:    Results from last 7 days   Lab Units 02/28/24  0307 02/27/24  0123 02/26/24  0132 02/24/24  0201 02/23/24  0201 02/22/24  1619 02/22/24  1518   CRP mg/dL  --   --   --   --   --   --  8.72*   LACTATE mmol/L  --   --   --   --   --  1.2  --    WBC 10*3/mm3 12.20* 13.91* 12.59*   < > 10.55  --  13.08*   HEMOGLOBIN g/dL 11.6* 11.7* 11.5*   < > 13.5  --  13.6   HEMATOCRIT % 37.4 36.4 36.0   < > 41.5  --  42.1   MCV fL 93.0 89.0 91.6   < > 88.5  --  89.2   MCHC g/dL 31.0* 32.1 31.9   < > 32.5  --  32.3   PLATELETS 10*3/mm3 268 281 280   < > 341  --  336   INR   --   --   --   --  1.03  --   --     < > = values in this interval not displayed.     Results from last 7 days   Lab Units 02/22/24  1420   PH, ARTERIAL pH units 7.435   PO2 ART mm Hg 53.6*   PCO2, ARTERIAL mm Hg 31.5*   HCO3 ART mmol/L 21.1     Results from last 7 days   Lab Units 02/28/24  0307 02/27/24  0123 02/26/24  0132 02/24/24  0201 02/23/24  0201 02/22/24  1518   SODIUM mmol/L 138 139 139   < > 140 138   POTASSIUM mmol/L 4.0 3.8 4.0   < > 3.8 4.0   CHLORIDE  "mmol/L 103 106 104   < > 101 103   CO2 mmol/L 22.8 22.7 23.7   < > 25.4 22.6   BUN mg/dL 14 15 14   < > 26* 27*   CREATININE mg/dL 0.74 0.90 0.93   < > 0.96 0.94   CALCIUM mg/dL 9.1 9.0 8.8   < > 9.6 9.6   GLUCOSE mg/dL 124* 135* 133*   < > 155* 91   ALBUMIN g/dL  --  3.3*  --   --  3.8 3.8   BILIRUBIN mg/dL  --  0.3  --   --  0.4 0.3   ALK PHOS U/L  --  95  --   --  84 88   AST (SGOT) U/L  --  33*  --   --  18 21   ALT (SGPT) U/L  --  18  --   --  16 18    < > = values in this interval not displayed.   Estimated Creatinine Clearance: 68.1 mL/min (by C-G formula based on SCr of 0.74 mg/dL).  No results found for: \"AMMONIA\"  Results from last 7 days   Lab Units 02/22/24  1922 02/22/24  1518   HSTROP T ng/L 50* 53*     Results from last 7 days   Lab Units 02/22/24  1518   PROBNP pg/mL 22,342.0*     No results found for: \"HGBA1C\"  Lab Results   Component Value Date    TSH 0.915 02/22/2024         Pain Management Panel           No data to display                Blood Culture   Date Value Ref Range Status   02/22/2024 No growth at 5 days  Final   02/22/2024 No growth at 5 days  Final     No results found for: \"URINECX\"  No results found for: \"WOUNDCX\"  No results found for: \"STOOLCX\"              Radiology:  Imaging Results (Last 72 Hours)       ** No results found for the last 72 hours. **              Assessment:      Status post right hip IT fracture repair, POD #4.       Plan:      Encourage mobility / ambulation.     Weight bear as tolerated to the right lower extremity.     Physical therapy / Occupational therapy for mobility and ambulation.     On Lovenox for DVT prophylaxis x 14 days.     Maintain the dressing to the right hip.  My change as needed for saturation, no Betadine or wound cleansing needed.     Pain control.       Follow up in the office with Dr. Burton in 2 weeks for reevaluation.     Please call for any questions or concerns.       JAZMÍN Davis   February 28, 2024   08:31 " EST    Electronically signed by Harrison Abraham APRN at 24 0834          Consult Notes (most recent note)        Indio Edmond APRN at 24 0943        Consult Orders    1. Inpatient Orthopedic Surgery Consult [[id number]] ordered by Ivon Whitmore DO              Attestation signed by Brijesh Burton MD at 24 [phone]    I have reviewed this documentation and agree.                                                  Inpatient Orthopedic Surgery Consult  Consult performed by: Indio Edmond APRN  Consult ordered by: Ivon Whitmore DO          Patient Identification:  Name:  Breanne Dickson  Age:  68 y.o.  Sex:  female  :  1955  MRN:  [id number]  Visit Number:  [id number]  Primary care provider:  Mery Coulter DO    History of presenting illness:   This is a 68-year-old female who presented to the emergency department secondary to right hip pain.  The patient's family was at bedside.  Patient has significant history of dementia, is unable to assist any significant history.  Patient's family notes that on Tuesday night she sustained a mechanical fall.  The patient has been ambulating on the right lower extremity, but was noting persistent right hip pain with ambulation.  Patient's family brought the patient to the emergency department for evaluation.  The patient on an MRI imaging was noted to have a right intertrochanteric fracture.  The patient at baseline is fairly mobile.  She has not had history of any known frequent falls.  The patient does not routinely utilize any walker or cane for assistive device.  The patient's family notes that they have continued to note decline in her cognition since  when she was initially diagnosed with dementia.  The patient is pleasantly confused on exam.  She is alert to place, and person.  She was not alert to the year.  But did know who the president was.  Patient takes aspirin for anticoagulation.  No other arthralgias noted  this morning on exam.    Review of Systems:     Unable to accurately obtain due to patient's confusion.  ---------------------------------------------------------------------------------------------------------------------  ---------------------------------------------------------------------------------------------------------------------   Past History:  Family History   Problem Relation Age of Onset    Breast cancer Neg Hx      Past Medical History:   Diagnosis Date    Arthritis     Diabetes mellitus     Hyperlipidemia     Hypertension     Multiple rib fractures 2000     History reviewed. No pertinent surgical history.  Social History     Socioeconomic History    Marital status: Single   Tobacco Use    Smoking status: Every Day     Packs/day: 0.25     Years: 40.00     Additional pack years: 0.00     Total pack years: 10.00     Types: Electronic Cigarette, Cigarettes   Vaping Use    Vaping Use: Never used   Substance and Sexual Activity    Alcohol use: No    Drug use: No    Sexual activity: Defer     ---------------------------------------------------------------------------------------------------------------------   Allergies:  Penicillins  ---------------------------------------------------------------------------------------------------------------------   Prior to Admission Medications       Prescriptions Last Dose Informant Patient Reported? Taking?    acetaminophen-codeine (TYLENOL #3) 300-30 MG per tablet   No No    Take 1 tablet by mouth Every 6 (Six) Hours As Needed for Moderate Pain .    aspirin 81 MG chewable tablet   Yes No    Chew 81 mg Daily.    cyclobenzaprine (FLEXERIL) 10 MG tablet   No No    Take 1 tablet by mouth 2 (Two) Times a Day As Needed for Muscle Spasms.    FLUoxetine (PROzac) 20 MG capsule   Yes No    Take 40 mg by mouth Daily.    indomethacin (INDOCIN) 50 MG capsule   No No    Take 1 capsule by mouth 3 (Three) Times a Day With Meals.    LISINOPRIL PO   Yes No    Take  by mouth.     metFORMIN (GLUCOPHAGE) 500 MG tablet   Yes No    Take 500 mg by mouth 2 (Two) Times a Day With Meals.    methylPREDNISolone (MEDROL, JORGE,) 4 MG tablet   No No    Take as directed on package instructions.    SIMVASTATIN PO   Yes No    Take  by mouth.          [facility] Meds:  nicotine, 1 patch, Transdermal, Q24H  sodium chloride, 10 mL, Intravenous, Q12H         ---------------------------------------------------------------------------------------------------------------------   Vital Signs:  Temp:  [97.6 °F (36.4 °C)-98.7 °F (37.1 °C)] 97.6 °F (36.4 °C)  Heart Rate:  [] 95  Resp:  [17-18] 18  BP: (105-140)/(72-94) 113/75      02/22/24  1315 02/22/24  2154   Weight: 59.4 kg (131 lb) 58.5 kg (129 lb)     Body mass index is 26.05 kg/m².  ---------------------------------------------------------------------------------------------------------------------   Physical exam:  Constitutional:  Well developed, well nourished.  No respiratory distress.      HENT:  Head: Normocephalic and atraumatic.  Neck:  Neck supple.  Cardiovascular:  Normal rate  Pulmonary/Chest:  No respiratory distress  Abdominal:  Soft, no tenderness.   Musculoskeletal: Upon examination of the right hip the patient has no open wounds noted.  No significant soft tissue swelling is noted.  The right thigh is soft.  Good neurovascular status right lower extremity noted.  Dorsiflexion plantarflexion intact right ankle.  No foot drop abnormality is noted.  The patient has positive pedal pulses noted to the right lower extremity.  No cyanosis is noted.  There is no cellulitis or erythema of the right hip noted.  Neurological:  Alert and oriented to person and place but not to time.  Skin:  Skin is warm and dry  Psychiatric:  Normal mood and affect  Peripheral vascular:  No edema   ---------------------------------------------------------------------------------------------------------------------  "  .  ---------------------------------------------------------------------------------------------------------------------   Results from last 7 days   Lab Units 02/23/24  0201 02/22/24  1619 02/22/24  1518   CRP mg/dL  --   --  8.72*   LACTATE mmol/L  --  1.2  --    WBC 10*3/mm3 10.55  --  13.08*   HEMOGLOBIN g/dL 13.5  --  13.6   HEMATOCRIT % 41.5  --  42.1   MCV fL 88.5  --  89.2   MCHC g/dL 32.5  --  32.3   PLATELETS 10*3/mm3 341  --  336   INR  1.03  --   --      Results from last 7 days   Lab Units 02/22/24  1420   PH, ARTERIAL pH units 7.435   PO2 ART mm Hg 53.6*   PCO2, ARTERIAL mm Hg 31.5*   HCO3 ART mmol/L 21.1     Results from last 7 days   Lab Units 02/23/24  0201 02/22/24  1518   SODIUM mmol/L 140 138   POTASSIUM mmol/L 3.8 4.0   CHLORIDE mmol/L 101 103   CO2 mmol/L 25.4 22.6   BUN mg/dL 26* 27*   CREATININE mg/dL 0.96 0.94   CALCIUM mg/dL 9.6 9.6   GLUCOSE mg/dL 155* 91   ALBUMIN g/dL 3.8 3.8   BILIRUBIN mg/dL 0.4 0.3   ALK PHOS U/L 84 88   AST (SGOT) U/L 18 21   ALT (SGPT) U/L 16 18   Estimated Creatinine Clearance: 52.5 mL/min (by C-G formula based on SCr of 0.96 mg/dL).  No results found for: \"AMMONIA\"  Results from last 7 days   Lab Units 02/22/24  1922 02/22/24  1518   HSTROP T ng/L 50* 53*     Results from last 7 days   Lab Units 02/22/24  1518   PROBNP pg/mL 22,342.0*     No results found for: \"HGBA1C\"  Lab Results   Component Value Date    TSH 0.915 02/22/2024     No results found for: \"PREGTESTUR\", \"PREGSERUM\", \"HCG\", \"HCGQUANT\"  Pain Management Panel           No data to display              No results found for: \"BLOODCX\"  No results found for: \"URINECX\"  No results found for: \"WOUNDCX\"  No results found for: \"STOOLCX\"      ---------------------------------------------------------------------------------------------------------------------   Imaging Results (Last 7 Days)       Procedure Component Value Units Date/Time    MRI Hip Right Without Contrast [[id number]] Collected: 02/22/24 1904     " Updated: 02/22/24 2003    Narrative:      Comparison: None     Fracture right intertrochanter which involves the right greater  trochanter. No dislocation is seen. Left hip is intact. Soft tissue  edema surrounding right hip. No significant joint effusion is seen. Tear  of the right gluteus medius muscle at the myotendinous junction.       Impression:      Impression:  1. Right intertrochanteric fracture.  2. Edema right hip with tear the right gluteus medius muscle.        This report was finalized on 2/22/2024 7:08 PM by Ric Bowman DO.       CT Abdomen Pelvis With Contrast [[id number]] Collected: 02/22/24 1651     Updated: 02/22/24 1655    Narrative:      PROCEDURE: CT ABDOMEN PELVIS W CONTRAST-     HISTORY:  sepsis of unknown etiology; S72.114A-Nondisplaced fracture of  greater trochanter of right femur, initial encounter for closed  fracture; J96.01-Acute respiratory failure with hypoxia     COMPARISON:  None .     TECHNIQUE: Multiple axial CT images were obtained from the lung bases  through the pubic symphysis following the administration of Isovue 300  contrast. This study was performed with techniques to keep radiation  doses as low as reasonably achievable (ALARA). Individualized dose  reduction techniques using automated exposure control or adjustment of  mA and/or kV according to the patient size were employed.     FINDINGS:     ABDOMEN: The lung bases are clear. The heart is normal in size. The  liver is normal in attenuation with no focal lesions. The spleen is  homogenous. No adrenal mass is present. The pancreas is unremarkable.  The kidneys are normal. The aorta is normal in caliber. The mesenteric  vascualture is patent. There is no free fluid or adenopathy. There is a  moderate sized hiatal hernia. There is no evidence of a small bowel  obstruction.     PELVIS: The appendix is not identified. The patient is status post  hysterectomy. The urinary bladder is unremarkable. There is no  significant  free fluid or adenopathy. Bone windows redemonstrate a  nondisplaced fracture of the right greater trochanter. There are diffuse  degenerative changes within the lumbar spine with grade 1  anterolisthesis of L4 on L5 the extraperitoneal soft tissues are  unremarkable.       Impression:      No acute intra-abdominal or pelvic process.        This report was finalized on 2/22/2024 4:53 PM by Kayla Canales M.D..       CT Angiogram Chest Pulmonary Embolism [[id number]] Collected: 02/22/24 1649     Updated: 02/22/24 1652    Narrative:      PROCEDURE: CT ANGIOGRAM CHEST PULMONARY EMBOLISM-     HISTORY: Pulmonary embolism (PE) suspected, positive D-dimer;  S72.114A-Nondisplaced fracture of greater trochanter of right femur,  initial encounter for closed fracture; J96.01-Acute respiratory failure  with hypoxia     COMPARISON: None .     TECHNIQUE: Multiple axial CT images were obtained from the thoracic  inlet through the upper abdomen following the administration of Isovue  300 per the CT PE protocol. Coronal and oblique 3D MIP images were  reconstructed from the original axial data set. This study was performed  with techniques to keep radiation doses as low as reasonably achievable  (ALARA). Individualized dose reduction techniques using automated  exposure control or adjustment of mA and/or kV according to the patient  size were employed.     FINDINGS: There are no filling defects within the pulmonary arteries to  suggest an embolus. The thoracic aorta is normal in caliber the heart is  normal in size. There are no pleural or pericardial effusions. There is  no adenopathy. There is a moderate sized hiatal hernia. Lung windows  reveal fibrotic lung changes. The visualized upper abdomen is  unremarkable. Bone windows reveal no acute osseous abnormalities.       Impression:      1. No evidence of pulmonary embolus.  2. Fibrotic lung changes.              This report was finalized on 2/22/2024 4:50 PM by Kayla  LIDIA Canales.       CT Lower Extremity Right Without Contrast [[id number]] Collected: 02/22/24 1534     Updated: 02/22/24 1537    Narrative:      PROCEDURE: CT LOWER EXTREMITY RIGHT WO CONTRAST-     HISTORY: right hip pain s/p fall     COMPARISON: None .     PROCEDURE: Axial images were obtained through the right hip by computed  tomography. Sagittal and coronal reconstruction images were performed.  This study was performed with techniques to keep radiation doses as low  as reasonably achievable (ALARA). Individualized dose reduction  techniques using automated exposure control or adjustment of mA and/or  kV according to the patient size were employed.         FINDINGS: There is a fracture of the right greater trochanter. No other  fractures or dislocations are evident. The joint spaces are preserved.  The soft tissues are unremarkable.       Impression:      Fracture of the right greater trochanter.              This report was finalized on 2/22/2024 3:35 PM by Kayla Canales M.D..       CT Pelvis Without Contrast [[id number]] Collected: 02/22/24 1533     Updated: 02/22/24 1536    Narrative:      PROCEDURE: CT PELVIS WO CONTRAST-     HISTORY: pain s/p fall     COMPARISON: None.     PROCEDURE: Axial images were obtained through the pelvis using bone  window algorithms. Coronal and sagittal images were reformatted. This  study was performed with techniques to keep radiation doses as low as  reasonably achievable (ALARA). Individualized dose reduction techniques  using automated exposure control or adjustment of mA and/or kV according  to the patient size were employed.     FINDINGS:     PELVIS: There is a fracture of the right greater trochanter. No other  fractures or dislocations are evident. The pubic symphysis and SI joints  are intact. The soft tissues are unremarkable.       Impression:      Fracture of the right greater trochanter.              This report was finalized on 2/22/2024 3:34 PM by Kayla  LIDIA Canales.       CT Head Without Contrast [[id number]] Collected: 02/22/24 1442     Updated: 02/22/24 1444    Narrative:      PROCEDURE: CT HEAD WO CONTRAST-     HISTORY: AMS; frequent falls     COMPARISON:  None .     TECHNIQUE: Multiple axial CT images were performed from the foramen  magnum to the vertex without enhancement. This study was performed with  techniques to keep radiation doses as low as reasonably achievable  (ALARA). Individualized dose reduction techniques using automated  exposure control or adjustment of mA and/or kV according to the patient  size were employed.     FINDINGS: There is generalized cerebral volume loss. Patchy  hypodensities in the periventricular white matter consistent with  chronic small vessel ischemic change. There is no CT evidence of  hemorrhage. There is no mass, mass effect or midline shift.  There is no  hydrocephalus. The paranasal sinuses are clear. Bone windows reveal no  acute osseous abnormalities.       Impression:      No acute intracranial process.              This report was finalized on 2/22/2024 2:42 PM by Kayla Canales M.D..       XR Hip With or Without Pelvis 2 - 3 View Right [[id number]] Collected: 02/22/24 1440     Updated: 02/22/24 1444    Narrative:      PROCEDURE: XR HIP W OR WO PELVIS 2-3 VIEW RIGHT-     HISTORY: pain s/p fall     COMPARISON: None.     FINDINGS: A possible nondisplaced fracture of the right greater  trochanter. The joint spaces are preserved. The pubic symphysis and SI  joints are intact. The soft tissues are unremarkable.       Impression:      Possible nondisplaced fracture of the right greater  trochanter. CT of the right hip may prove useful in further evaluation              This report was finalized on 2/22/2024 2:41 PM by Kayla Canales M.D..       XR Chest 1 View [[id number]] Collected: 02/22/24 1439     Updated: 02/22/24 1442    Narrative:      PROCEDURE: XR CHEST 1 VW-       HISTORY: wheezing     COMPARISON:  2017.     FINDINGS: The heart is normal in size. The mediastinum is unremarkable.  There is a reticular lung pattern bilaterally which is new compared to  the prior exam. There are no effusions. There is no pneumothorax. There  are no acute osseous abnormalities.       Impression:      Fibrotic lung changes with no acute cardiopulmonary process.        This report was finalized on 2024 2:40 PM by Kayla Canales M.D..             ----------------------------------------------------------------------------------------------------------------------  Assessment:   #1 right hip intertrochanteric fracture          Plan:     Dr. Burton reviewed the patient's imaging.  On MRI imaging, patient has findings consistent with right hip intertrochanteric fracture.  Dr. Burton recommends surgical intervention.  The patient be scheduled for right hip open reduction internal fixation with intramedullary nail.    Surgical intervention will be scheduled for 2024    Patient will be n.p.o. after midnight    PT/INR obtained preoperatively    Will obtain 2 views of the right femur to rule out more distal fracture morphology.    Type and screen has been obtained    Dr. Burton will discuss the risks and benefits associated with surgical intervention with the patient's family    Hospitalist for medical management    Orthopedic surgery will continue to follow      Thank you for the consult.    Indio Edmond, APRN  24  09:43 EST    Electronically signed by Brijesh Burton MD at 24 7988          Physical Therapy Notes (most recent note)        Ivana Murillo PTA at 24 1116  Version 1 of 1         Acute Care - Physical Therapy Treatment Note  [facility]     Patient Name: Breanne Dickson  : 1955  MRN: [id number]  Today's Date: 2024   Onset of Illness/Injury or Date of Surgery: 24  Visit Dx:     ICD-10-CM ICD-9-CM   1. Closed nondisplaced intertrochanteric fracture of right femur,  initial encounter  S72.144A 820.21   2. Closed nondisplaced fracture of greater trochanter of right femur, initial encounter  S72.114A 820.20   3. Acute on chronic respiratory failure with hypoxia  J96.21 518.84     799.02   4. Hypervolemia, unspecified hypervolemia type  E87.70 276.69   5. COPD with acute exacerbation  J44.1 491.21   6. Sepsis, due to unspecified organism, unspecified whether acute organ dysfunction present  A41.9 038.9     995.91     Patient Active Problem List   Diagnosis    Intertrochanteric fracture of right femur     Past Medical History:   Diagnosis Date    Arthritis     Diabetes mellitus     Hyperlipidemia     Hypertension     Multiple rib fractures 2000     Past Surgical History:   Procedure Laterality Date    FEMUR IM NAILING/RODDING Right 2/24/2024    Procedure: FEMUR INTRAMEDULLARY NAILING/RODDING;  Surgeon: Brijesh Burton MD;  Location: [facility];  Service: Orthopedics;  Laterality: Right;     PT Assessment (last 12 hours)       PT Evaluation and Treatment       Row Name 02/27/24 1110          Physical Therapy Time and Intention    Subjective Information no complaints  -HC     Document Type therapy note (daily note)  -HC     Mode of Treatment physical therapy  -HC     Patient Effort good  -HC     Comment Pt and RN in agreement for PT. Pt does well on this date and walked 140' with standing rest breaks as needed. Pt does require RW and Bessy/CGA, minimal cueing secondary to confusion. Sitting exercises completed up in chair once back into room. Pt would benefit from IPR to improve mobility and return to PLOF.  -HC       Row Name 02/27/24 1110          General Information    Patient Profile Reviewed yes  -HC     Existing Precautions/Restrictions fall  -HC     Limitations/Impairments safety/cognitive  -HC       Row Name 02/27/24 1110          Pain Scale: FACES Pre/Post-Treatment    Pain: FACES Scale, Pretreatment 2-->hurts little bit  -HC     Posttreatment Pain Rating 4-->hurts little more   -       Row Name 02/27/24 1110          Cognition    Affect/Mental Status (Cognition) confused  -     Orientation Status (Cognition) oriented to;person  -     Follows Commands (Cognition) follows one-step commands;repetition of directions required;verbal cues/prompting required  -     Personal Safety Interventions fall prevention program maintained;gait belt;nonskid shoes/slippers when out of bed;supervised activity  -       Row Name 02/27/24 1110          Vision Assessment/Intervention    Visual Impairment/Limitations corrective lenses full-time  -       Row Name 02/27/24 1110          Mobility    Extremity Weight-bearing Status right lower extremity  -     Right Lower Extremity (Weight-bearing Status) weight-bearing as tolerated (WBAT)  -       Row Name 02/27/24 1110          Bed Mobility    Bed Mobility scooting/bridging;supine-sit;rolling left  -     Rolling Left [place] (Bed Mobility) verbal cues;nonverbal cues (demo/gesture);minimum assist (75% patient effort)  -     Supine-Sit [place] (Bed Mobility) verbal cues;nonverbal cues (demo/gesture);minimum assist (75% patient effort)  -[facility] Name 02/27/24 1110          Transfers    Transfers sit-stand transfer;stand-sit transfer;stand pivot/stand step transfer  -       Row Name 02/27/24 1110          Sit-Stand Transfer    Sit-Stand [place] (Transfers) verbal cues;nonverbal cues (demo/gesture);minimum assist (75% patient effort)  -     Assistive Device (Sit-Stand Transfers) walker, front-wheeled  -[facility] Name 02/27/24 1110          Stand-Sit Transfer    Stand-Sit [place] (Transfers) minimum assist (75% patient effort);nonverbal cues (demo/gesture);verbal cues  -     Assistive Device (Stand-Sit Transfers) walker, front-wheeled  -       Row Name 02/27/24 1110          Stand Pivot/Stand Step Transfer    Stand Pivot/Stand Step [place] (Transfers) verbal cues;nonverbal cues (demo/gesture);minimum assist (75%  patient effort)  -     Assistive Device (Stand Pivot Stand Step Transfer) walker, front-wheeled  -HC       Row Name 02/27/24 1110          Gait/Stairs (Locomotion)    Gait/Stairs Locomotion gait/ambulation independence;gait/ambulation assistive device;distance ambulated;gait pattern;gait deviations;maintains weight-bearing status  -     [place] Level (Gait) verbal cues;nonverbal cues (demo/gesture);minimum assist (75% patient effort);contact guard  -     Assistive Device (Gait) walker, front-wheeled  -     Distance in Feet (Gait) 140  -HC     Pattern (Gait) step-to  -     Deviations/Abnormal Patterns (Gait) antalgic;gait speed decreased;weight shifting decreased  -       Row Name 02/27/24 1110          Safety Issues, Functional Mobility    Impairments Affecting Function (Mobility) balance;cognition;endurance/activity tolerance;pain;strength  -       Row Name 02/27/24 1110          Motor Skills    Therapeutic Exercise other (see comments)  Sitting: AP, LAQ, March, knees in/out  -       Row Name             Wound 02/24/24 1033 Right anterior thigh    Wound - Properties Group Placement Date: 02/24/24  -JG Placement Time: 1033  -JG Side: Right  -JG Orientation: anterior  -JG Location: thigh  -JG    Retired Wound - Properties Group Placement Date: 02/24/24  -JG Placement Time: 1033  -JG Side: Right  -JG Orientation: anterior  -JG Location: thigh  -JG    Retired Wound - Properties Group Date first assessed: 02/24/24  -JG Time first assessed: 1033  -JG Side: Right  -JG Location: thigh  -JG      Row Name 02/27/24 1110          Coping    Observed Emotional State calm;cooperative  -     Verbalized Emotional State acceptance  -     Family/Support Persons daughter  -     Involvement in Care at bedside;attentive to patient;participating in care  -       Row Name 02/27/24 1110          Positioning and Restraints    Pre-Treatment Position in bed  -     Post Treatment Position chair  -     In Chair  sitting;call light within reach;encouraged to call for assist;exit alarm on  -               User Key  (r) = Recorded By, (t) = Taken By, (c) = Cosigned By      Initials Name Provider Type    Lourdes Braun, RN Registered Nurse    Ivana Phan PTA Physical Therapist Assistant                    Physical Therapy Education       Title: PT OT SLP Therapies (In Progress)       Topic: Physical Therapy (In Progress)       Point: Mobility training (In Progress)       Learning Progress Summary             Patient Acceptance, E,D, NR by  at 2/26/2024 1120   Family Acceptance, E,D, NR by AG at 2/26/2024 1120                         Point: Home exercise program (In Progress)       Learning Progress Summary             Patient Acceptance, E,D, NR by  at 2/26/2024 1120   Family Acceptance, E,D, NR by AG at 2/26/2024 1120                         Point: Body mechanics (In Progress)       Learning Progress Summary             Patient Acceptance, E,D, NR by  at 2/26/2024 1120   Family Acceptance, E,D, NR by  at 2/26/2024 1120                         Point: Precautions (In Progress)       Learning Progress Summary             Patient Acceptance, E,D, NR by AG at 2/26/2024 1120   Family Acceptance, E,D, NR by AG at 2/26/2024 1120                                         User Key       Initials Effective Dates Name Provider Type [facility] 06/16/21 -  Ijeoma Lutz, PT Physical Therapist PT                  PT Recommendation and Plan  Anticipated Discharge Disposition (PT): inpatient rehabilitation facility          Time Calculation:    PT Charges       Row Name 02/27/24 1115             Time Calculation    PT Received On 02/27/24  -         Time Calculation- PT    Total Timed Code Minutes- PT 23 minute(s)  -                User Key  (r) = Recorded By, (t) = Taken By, (c) = Cosigned By      Initials Name Provider Type    Ivana Phan, ALEKSANDRA Physical Therapist Assistant                   Therapy Charges for Today       Code Description Service Date Service Provider Modifiers Qty    37011273476 HC GAIT TRAINING EA 15 MIN 2024 Ivana Murillo PTA GP, CQ 1    99287552173 HC PT THER PROC EA 15 MIN 2024 Ivana Murillo PTA GP, CQ 1            PT G-Codes  AM-PAC 6 Clicks Score (PT): 18    Ivana Murillo PTA  2024      Electronically signed by Ivana Murillo PTA at 24 1116          Occupational Therapy Notes (most recent note)        Brijesh Davis, OT at 24 1140          Acute Care - Occupational Therapy Treatment Note   [facility]     Patient Name: Breanne Dickson  : 1955  MRN: [id number]  Today's Date: 2024  Onset of Illness/Injury or Date of Surgery: 24     Referring Physician: Micheal    Admit Date: 2024       ICD-10-CM ICD-9-CM   1. Closed nondisplaced intertrochanteric fracture of right femur, initial encounter  S72.144A 820.21   2. Closed nondisplaced fracture of greater trochanter of right femur, initial encounter  S72.114A 820.20   3. Acute on chronic respiratory failure with hypoxia  J96.21 518.84     799.02   4. Hypervolemia, unspecified hypervolemia type  E87.70 276.69   5. COPD with acute exacerbation  J44.1 491.21   6. Sepsis, due to unspecified organism, unspecified whether acute organ dysfunction present  A41.9 038.9     995.91   7. Chronic obstructive pulmonary disease, unspecified COPD type  J44.9 496     Patient Active Problem List   Diagnosis    Intertrochanteric fracture of right femur     Past Medical History:   Diagnosis Date    Arthritis     Diabetes mellitus     Hyperlipidemia     Hypertension     Multiple rib fractures      Past Surgical History:   Procedure Laterality Date    FEMUR IM NAILING/RODDING Right 2024    Procedure: FEMUR INTRAMEDULLARY NAILING/RODDING;  Surgeon: Brijesh Burton MD;  Location: [facility];  Service: Orthopedics;  Laterality: Right;         OT ASSESSMENT FLOWSHEET (last 12  hours)       OT Evaluation and Treatment       Row Name 02/28/24 1135                   OT Time and Intention    Document Type therapy note (daily note)  -KR        Mode of Treatment occupational therapy  -KR        Patient Effort adequate  -KR        Comment Daughter explained that pt had been denied rehabilitation admission and she was going to take her mother home to receive 24/7 care/assist as needed. We discussed AE/DME needs in home and caregiver agreed to receive/provide all that is recommended/needed. Pt will need elevated toilet seat, walker.  -KR           Motor Skills    Therapeutic Exercise elbow/forearm;hand  BUE AROM continues adequate for use of DME in mobility tasks.  -KR           Elbow/Forearm (Therapeutic Exercise)    Elbow/Forearm (Therapeutic Exercise) AROM (active range of motion)  -KR        Elbow/Forearm AROM (Therapeutic Exercise) supine  -KR           Hand (Therapeutic Exercise)    Hand (Therapeutic Exercise) AROM (active range of motion)  -KR           Wound 02/24/24 1033 Right anterior thigh    Wound - Properties Group Placement Date: 02/24/24  -JG Placement Time: 1033  -JG Side: Right  -JG Orientation: anterior  -JG Location: thigh  -JG    Retired Wound - Properties Group Placement Date: 02/24/24  -JG Placement Time: 1033  -JG Side: Right  -JG Orientation: anterior  -JG Location: thigh  -JG    Retired Wound - Properties Group Date first assessed: 02/24/24  -JG Time first assessed: 1033  -JG Side: Right  -JG Location: thigh  -JG       Plan of Care Review    Plan of Care Reviewed With patient;daughter  -KR           Therapy Plan Review/Discharge Plan (OT)    Anticipated Discharge Disposition (OT) home with assist  -KR                  User Key  (r) = Recorded By, (t) = Taken By, (c) = Cosigned By      Initials Name Effective Dates    Lourdes Braun RN 08/29/23 -     Brijesh Casey OT 06/16/21 -                            OT Recommendation and Plan  Planned Therapy Interventions  (OT): activity tolerance training, adaptive equipment training, BADL retraining  Plan of Care Review  Plan of Care Reviewed With: patient, daughter  Plan of Care Reviewed With: patient, daughter        Time Calculation:     Therapy Charges for Today       Code Description Service Date Service Provider Modifiers Qty    64466744817  OT EVAL MOD COMPLEXITY 4 2/27/2024 Brijesh Davis OT GO 1    [id number]  OT THERAPEUTIC ACT EA 15 MIN 2/28/2024 Brijesh Davis OT GO 1                 Brijesh Davis OT  2/28/2024    Electronically signed by Brijesh Davis OT at 02/28/24 1140       Discharge Summary    No notes of this type exist for this encounter.       Discharge Order (From admission, onward)       Start     Ordered    02/28/24 1115  Discharge patient  Once        Expected Discharge Date: 02/28/24   Discharge Disposition: Home or Self Care   Physician of Record for Attribution - Please select from Treatment Team: JACK GORMAN [[id number]]   Review needed by CMO to determine Physician of Record: No      Question Answer Comment   Physician of Record for Attribution - Please select from Treatment Team JACK GORMAN    Review needed by CMO to determine Physician of Record No        02/28/24 1124

## 2024-02-28 NOTE — PLAN OF CARE
Goal Outcome Evaluation:              Outcome Evaluation: Pt is resting in bed at this time. Family to remain at bedside. Pt c/o of pain, PRN medication given. Pt has ambulated this shift. No concerns or complaints at this time. Will continue POC.

## 2024-02-28 NOTE — DISCHARGE INSTR - ACTIVITY
Maintain the dressing to the right hip.  May change as needed for saturation, no Betadine or wound cleansing needed.

## 2024-03-01 ENCOUNTER — APPOINTMENT (OUTPATIENT)
Dept: GENERAL RADIOLOGY | Facility: HOSPITAL | Age: 69
End: 2024-03-01
Payer: MEDICARE

## 2024-03-01 ENCOUNTER — APPOINTMENT (OUTPATIENT)
Dept: CT IMAGING | Facility: HOSPITAL | Age: 69
End: 2024-03-01
Payer: MEDICARE

## 2024-03-01 ENCOUNTER — HOSPITAL ENCOUNTER (EMERGENCY)
Facility: HOSPITAL | Age: 69
Discharge: HOME | End: 2024-03-01
Attending: EMERGENCY MEDICINE
Payer: MEDICARE

## 2024-03-01 VITALS
BODY MASS INDEX: 26 KG/M2 | OXYGEN SATURATION: 95 % | WEIGHT: 129 LBS | TEMPERATURE: 97.9 F | HEIGHT: 59 IN | RESPIRATION RATE: 20 BRPM | SYSTOLIC BLOOD PRESSURE: 116 MMHG | DIASTOLIC BLOOD PRESSURE: 74 MMHG | HEART RATE: 96 BPM

## 2024-03-01 DIAGNOSIS — R29.6 MULTIPLE FALLS: Primary | ICD-10-CM

## 2024-03-01 PROCEDURE — 73502 X-RAY EXAM HIP UNI 2-3 VIEWS: CPT

## 2024-03-01 PROCEDURE — 72125 CT NECK SPINE W/O DYE: CPT

## 2024-03-01 PROCEDURE — 99284 EMERGENCY DEPT VISIT MOD MDM: CPT

## 2024-03-01 PROCEDURE — 72128 CT CHEST SPINE W/O DYE: CPT

## 2024-03-01 PROCEDURE — 70450 CT HEAD/BRAIN W/O DYE: CPT

## 2024-03-01 PROCEDURE — 72131 CT LUMBAR SPINE W/O DYE: CPT

## 2024-03-01 NOTE — ED PROVIDER NOTES
Subjective   History of Present Illness  This is a 68 year old female patient who presents to the ER with chief complaint of fall. PMH significant for dementia. Daughter is present and provides history. Patient lives with her daughter. PMH significant for recent right hip fracture repair on 02/24/24. She was unable to get into rehab afterwards. She also has DM not requiring insulin, HLD, HTN. She has had multiple unwitnessed falls at her daughter's home since she has been discharged from the hospital. Daughter is concerned and wants to get her into rehab.       Review of Systems   Unable to perform ROS: Dementia       Past Medical History:   Diagnosis Date    Arthritis     Diabetes mellitus     Hyperlipidemia     Hypertension     Multiple rib fractures 2000       Allergies   Allergen Reactions    Penicillins        Past Surgical History:   Procedure Laterality Date    FEMUR IM NAILING/RODDING Right 2/24/2024    Procedure: FEMUR INTRAMEDULLARY NAILING/RODDING;  Surgeon: Brijesh Burton MD;  Location: [facility];  Service: Orthopedics;  Laterality: Right;       Family History   Problem Relation Age of Onset    Breast cancer Neg Hx        Social History     Socioeconomic History    Marital status: Single   Tobacco Use    Smoking status: Every Day     Packs/day: 0.25     Years: 40.00     Additional pack years: 0.00     Total pack years: 10.00     Types: Electronic Cigarette, Cigarettes   Vaping Use    Vaping Use: Never used   Substance and Sexual Activity    Alcohol use: No    Drug use: No    Sexual activity: Defer           Objective   Physical Exam  Vitals and nursing note reviewed.   Constitutional:       General: She is not in acute distress.     Appearance: She is well-developed. She is not diaphoretic.   HENT:      Head: Normocephalic.      Right Ear: External ear normal.      Left Ear: External ear normal.      Nose: Nose normal.   Eyes:      Conjunctiva/sclera: Conjunctivae normal.      Pupils: Pupils are equal,  round, and reactive to light.   Neck:      Vascular: No JVD.      Trachea: No tracheal deviation.   Cardiovascular:      Rate and Rhythm: Normal rate and regular rhythm.      Heart sounds: Normal heart sounds. No murmur heard.  Pulmonary:      Effort: Pulmonary effort is normal. No respiratory distress.      Breath sounds: Normal breath sounds. No wheezing.   Abdominal:      General: Bowel sounds are normal.      Palpations: Abdomen is soft.      Tenderness: There is no abdominal tenderness.   Musculoskeletal:         General: Signs of injury present. No swelling, tenderness or deformity. Normal range of motion.      Cervical back: Normal range of motion and neck supple.   Skin:     General: Skin is warm and dry.      Coloration: Skin is not pale.      Findings: No erythema or rash.   Neurological:      General: No focal deficit present.      Mental Status: She is alert. Mental status is at baseline. She is disoriented.      Cranial Nerves: No cranial nerve deficit.      Sensory: No sensory deficit.      Motor: No weakness.      Coordination: Coordination normal.      Gait: Gait normal.      Deep Tendon Reflexes: Reflexes normal.   Psychiatric:         Behavior: Behavior normal.         Thought Content: Thought content normal.         Procedures           ED Course  ED Course as of 03/01/24 1338   Fri Mar 01, 2024   1223 CT Thoracic Spine Without Contrast  IMPRESSION:  1.  No acute fracture or traumatic malalignment.  2.  Advanced multilevel degenerative changes with central canal and  neuroforaminal stenosis.  3.  Pulmonary fibrosis.  4.  Old left rib fractures. Other nonacute findings above.        This report was finalized on 3/1/2024 12:18 PM by Dr. Brijesh Fischer MD   [MM]   1223 CT Lumbar Spine Without Contrast  IMPRESSION:  1.  No acute fracture identified.  2.  Advanced degenerative change noted throughout the lumbar spine with  central canal and neuroforaminal stenosis and multilevel  spondylolisthesis as  detailed above.        This report was finalized on 3/1/2024 12:17 PM by Dr. Brijesh Fischer MD   [MM]   1223 CT Cervical Spine Without Contrast  IMPRESSION:  1.  No acute fracture or traumatic malalignment.  2.  Advanced multilevel degenerative changes with central canal and  neuroforaminal stenosis.        This report was finalized on 3/1/2024 12:15 PM by Dr. Brijesh Fischer MD   [MM]   1224 CT Head Without Contrast  IMPRESSION:    Unremarkable exam demonstrating no CT evidence of acute intracranial  findings.        This report was finalized on 3/1/2024 12:13 PM by Dr. Brijesh Fischer MD   [MM]   1316 XR Hip With or Without Pelvis 2 - 3 View Right  IMPRESSION:    Postoperative changes from ORIF right proximal femur. Anatomic  alignment noted.        This report was finalized on 3/1/2024 1:02 PM by Dr. Brijesh Fischer MD   [MM]   1336 Patient diagnosed with multiple falls. Our case management will start working on arranging outpatient rehab for this patient. Will f/u with PCP in 2 days or return to ER if symptoms worsen.  [MM]      ED Course User Index  [MM] Minal Slaughter PA                                             Medical Decision Making    This is a 68 year old female patient who presents to the ER with chief complaint of fall. PMH significant for dementia. Daughter is present and provides history. Patient lives with her daughter. PMH significant for recent right hip fracture repair on 02/24/24. She was unable to get into rehab afterwards. She also has DM not requiring insulin, HLD, HTN. She has had multiple unwitnessed falls at her daughter's home since she has been discharged from the hospital. Daughter is concerned and wants to get her into rehab.       Problems Addressed:  Multiple falls: complicated acute illness or injury    Amount and/or Complexity of Data Reviewed  Radiology: ordered. Decision-making details documented in ED Course.        Final diagnoses:   Multiple falls       ED Disposition  ED  Disposition       ED Disposition   Discharge    Condition   Stable    Comment   --               Mery Coulter, DO  [address] [facility] [id number] [phone]    In 2 days           Medication List      No changes were made to your prescriptions during this visit.            Minal Slaughter, PA  03/01/24 3029

## 2024-03-05 ENCOUNTER — READMISSION MANAGEMENT (OUTPATIENT)
Dept: CALL CENTER | Facility: HOSPITAL | Age: 69
End: 2024-03-05
Payer: MEDICARE

## 2024-03-05 NOTE — OUTREACH NOTE
General Surgery Week 1 Survey      Flowsheet Row Responses   [facility] discharged [facility]? [facility]   Does the patient have one of the following disease processes/diagnoses(primary or secondary)? General Surgery   Week 1 attempt successful? No   Unsuccessful attempts Attempt 1            Monie MATTSON - Registered Nurse

## 2024-03-06 ENCOUNTER — TELEPHONE (OUTPATIENT)
Dept: SOCIAL WORK | Facility: HOSPITAL | Age: 69
End: 2024-03-06
Payer: MEDICARE

## 2024-03-06 NOTE — TELEPHONE ENCOUNTER
SS received call from daughter, Mary Oglesby [phone]. SS educated dtr about nursing home placement per request. Dtr to contact local nursing home and pt's home health agency for assistance with nursing home placement. Dtr to call SS back if nursing home's request pt's most recent medical record information. No other needs identified.

## 2024-03-06 NOTE — TELEPHONE ENCOUNTER
SS received call from dtr, Mary Olgesby. Dtr request clinical be faxed to [facility]. SS faxed clinical to [facility] [phone]. No other needs identified.

## 2024-03-06 NOTE — DISCHARGE PLACEMENT REQUEST
"Dilia Aleman (68 y.o. Female)       Date of Birth   1955    Social Security Number       Address   [address] [address]    Home Phone   [phone]    MRN   [id number]       [facility]    Marital Status   Single                            Admission Date   24    Admission Type   Emergency    Admitting Provider   Ivon Whitmore DO    Attending Provider       Department, Room/Bed   [address], 3343/2S       Discharge Date   2024    Discharge Disposition   Home-Health Care [facility]    Discharge Destination                                 Attending Provider: (none)   Allergies: Penicillins    Isolation: None   Infection: None   Code Status: Prior    Ht: 149.9 cm (59\")   Wt: 58.5 kg (129 lb)    Admission Cmt: None   Principal Problem: Intertrochanteric fracture of right femur [S72.141A]                   Active Insurance as of 2024       Primary Coverage       Payor Plan Insurance Group Employer/Plan Group    [facility] MEDICARE REPLACEMENT [facility] MED ADV GROUP [id number]       Payor Plan Address Payor Plan Phone Number Payor Plan Fax Number Effective Dates    [address] [address]   2024 - None Entered    [facility] [id number]         Subscriber Name Subscriber Birth Date Member ID       DILIA ALEMAN 1955 [id number]                     Emergency Contacts        (Rel.) Home Phone Work Phone Mobile Phone    Maykel HEATON (Daughter) [phone] -- --    MendelMary -- -- [phone]                 History & Physical        Peyton Mariscal PA-C at 24 [phone]       Attestation signed by Ivon Whitmore DO at 24 [phone]    I have reviewed this documentation and agree.                       [facility] Medicine Services  HISTORY & PHYSICAL    Patient Identification:  Name:  Dilia Aleman  Age:  68 y.o.  Sex:  female  :  1955  MRN:  [id number]   Visit Number:  [id number]  Admit Date: " "2/22/2024   Primary Care Physician:  Mery Coulter DO     Subjective     Chief complaint:   Chief Complaint   Patient presents with    Altered Mental Status     History of presenting illness:   Patient is a 68 y.o. female with past medical history significant for arthritis, diabetes, hypertension, hyperlipidemia, Alzheimer's dementia that presented to the [facility] emergency department for evaluation of altered mental status.     Patient examined at bedside on 3 N. Daughter states the patient has had increasing confusion and frequent falls recently.  Patient was supposed to move in with her daughter today, however since her fall on Tuesday, she has had difficulty walking and has been complaining of right hip pain.  They note the patient has been off her Alzheimer medication for the past month due to GI symptoms that they thought were related to this.  Patient's daughter states that patient lives in a small guest house on their property.  He had a camera to monitor/check in on her.  They state that the patient had a couple falls on Tuesday night and appeared right she \"blacked out\" after standing up, causing her to fall.  Daughter notes they had stopped Trulicity and other diabetic medications due to episodes of hypoglycemia.  She states that she believes they also changed her blood pressure medications after this as well due to hypotension.  Patient is pleasantly confused on my exam.She has no complaints and no hip pain at this time.  She is oriented to self and month but not year, location, or president.  Patient had to be reminded several times that she did break her hip.  Daughter denies any known diagnosis of CHF.  She believes the patient was on Lasix several years ago.  Denies any known issues with swelling or orthopnea.  She states the patient does have shortness of breath due to smoking.  Patient adamantly denied any tobacco abuse, however, the daughter reminded the patient that she did " smoke a cigarette on the way here.    Upon arrival to the ED, vitals were temperature 38.7, , RR 17, /72, SpO2 93% on room air.  Labs significant for initial troponin 53, repeat 50.  proBNP 22,342.  BUN 27, BUN/creatinine ratio 28.7.  D-dimer 1.28.  CRP 8.72.  WBC 13.08 CT head with no acute intracranial process.  CT pelvis/lower extremity shows fracture of right greater trochanter angiogram of left chest shows no PE, fibrotic lung changes.  CT abdomen pelvis showed no acute intra-abdominal/pelvic process.MRI Right hip shows right intertrochanteric fracture.  Edema of right hip with tear of right gluteus medius muscle.    Patient has been admitted to the Medr unit.  ---------------------------------------------------------------------------------------------------------------------   Review of Systems   Unable to perform ROS: Dementia      ---------------------------------------------------------------------------------------------------------------------   Past Medical History:   Diagnosis Date    Arthritis     Diabetes mellitus     Hyperlipidemia     Hypertension     Multiple rib fractures 2000     History reviewed. No pertinent surgical history.  Family History   Problem Relation Age of Onset    Breast cancer Neg Hx      Social History     Socioeconomic History    Marital status: Single   Tobacco Use    Smoking status: Every Day     Packs/day: 0.25     Years: 40.00     Additional pack years: 0.00     Total pack years: 10.00     Types: Electronic Cigarette, Cigarettes   Vaping Use    Vaping Use: Never used   Substance and Sexual Activity    Alcohol use: No    Drug use: No    Sexual activity: Defer     ---------------------------------------------------------------------------------------------------------------------   Allergies:  Penicillins  ---------------------------------------------------------------------------------------------------------------------   Medications below are reported home  medications pulling from within the system; at this time, these medications have not been reconciled unless otherwise specified and are in the verification process for further verifcation as current home medications.    Prior to Admission Medications       Prescriptions Last Dose Informant Patient Reported? Taking?    acetaminophen-codeine (TYLENOL #3) 300-30 MG per tablet   No No    Take 1 tablet by mouth Every 6 (Six) Hours As Needed for Moderate Pain .    aspirin 81 MG chewable tablet   Yes No    Chew 81 mg Daily.    cyclobenzaprine (FLEXERIL) 10 MG tablet   No No    Take 1 tablet by mouth 2 (Two) Times a Day As Needed for Muscle Spasms.    FLUoxetine (PROzac) 20 MG capsule   Yes No    Take 40 mg by mouth Daily.    indomethacin (INDOCIN) 50 MG capsule   No No    Take 1 capsule by mouth 3 (Three) Times a Day With Meals.    LISINOPRIL PO   Yes No    Take  by mouth.    metFORMIN (GLUCOPHAGE) 500 MG tablet   Yes No    Take 500 mg by mouth 2 (Two) Times a Day With Meals.    methylPREDNISolone (MEDROL, JORGE,) 4 MG tablet   No No    Take as directed on package instructions.    SIMVASTATIN PO   Yes No    Take  by mouth.          ---------------------------------------------------------------------------------------------------------------------    Objective     Hospital Scheduled Meds:  [START ON 2/23/2024] nicotine, 1 patch, Transdermal, Q24H  sodium chloride, 10 mL, Intravenous, Q12H           Current listed hospital scheduled medications may not yet reflect those currently placed in orders that are signed and held, awaiting patient's arrival to floor/unit.    ---------------------------------------------------------------------------------------------------------------------   Vital Signs:  Temp:  [98 °F (36.7 °C)-98.7 °F (37.1 °C)] 98 °F (36.7 °C)  Heart Rate:  [] 98  Resp:  [17-18] 18  BP: (105-140)/(72-94) 108/80  Mean Arterial Pressure (Non-Invasive) for the past 24 hrs (Last 3 readings):   Noninvasive MAP  (mmHg)   02/22/24 2154 93   02/22/24 2115 88   02/22/24 2045 94     SpO2 Percentage    02/22/24 2045 02/22/24 2114 02/22/24 2154   SpO2: 96% 96% 94%     SpO2:  [93 %-97 %] 94 %  on  Flow (L/min):  [1] 1;   Device (Oxygen Therapy): nasal cannula    Body mass index is 26.05 kg/m².  Wt Readings from Last 3 Encounters:   02/22/24 58.5 kg (129 lb)   05/27/19 75.8 kg (167 lb)   12/23/17 79.4 kg (175 lb)     ---------------------------------------------------------------------------------------------------------------------   Physical Exam:  Physical Exam  Constitutional:       General: She is not in acute distress.     Appearance: Normal appearance.   HENT:      Head: Normocephalic and atraumatic.      Right Ear: External ear normal.      Left Ear: External ear normal.      Nose: No nasal deformity.      Mouth/Throat:      Lips: Pink.      Mouth: Mucous membranes are moist.   Eyes:      Conjunctiva/sclera: Conjunctivae normal.      Pupils: Pupils are equal, round, and reactive to light.   Cardiovascular:      Rate and Rhythm: Normal rate and regular rhythm.      Pulses:           Dorsalis pedis pulses are 2+ on the right side and 2+ on the left side.      Heart sounds: Normal heart sounds.   Pulmonary:      Effort: Pulmonary effort is normal.      Breath sounds: Normal breath sounds. No wheezing, rhonchi or rales.   Abdominal:      General: Abdomen is flat. Bowel sounds are normal.      Palpations: Abdomen is soft.      Tenderness: There is no guarding or rebound.   Genitourinary:     Comments: No hayes catheter in place   Musculoskeletal:      Cervical back: Neck supple. Normal range of motion.      Right hip: No deformity, tenderness or bony tenderness. Normal range of motion.      Left hip: No deformity, tenderness or bony tenderness. Normal range of motion.      Right lower leg: No edema.      Left lower leg: No edema.   Skin:     General: Skin is warm and dry.   Neurological:      General: No focal deficit present.  "     Mental Status: She is alert. Mental status is at baseline.      Comments: Oriented only to self and month   Psychiatric:         Mood and Affect: Mood normal.         Behavior: Behavior normal.       ---------------------------------------------------------------------------------------------------------------------  EKG: Tachycardia, heart rate 104.  Awaiting formal cardiology read        I have personally reviewed the EKG/Telemetry strip  ---------------------------------------------------------------------------------------------------------------------   Results from last 7 days   Lab Units 02/22/24  1922 02/22/24  1518   HSTROP T ng/L 50* 53*     Results from last 7 days   Lab Units 02/22/24  1518   PROBNP pg/mL 22,342.0*       Results from last 7 days   Lab Units 02/22/24  1420   PH, ARTERIAL pH units 7.435   PO2 ART mm Hg 53.6*   PCO2, ARTERIAL mm Hg 31.5*   HCO3 ART mmol/L 21.1     Results from last 7 days   Lab Units 02/22/24  1619 02/22/24  1518   CRP mg/dL  --  8.72*   LACTATE mmol/L 1.2  --    WBC 10*3/mm3  --  13.08*   HEMOGLOBIN g/dL  --  13.6   HEMATOCRIT %  --  42.1   MCV fL  --  89.2   MCHC g/dL  --  32.3   PLATELETS 10*3/mm3  --  336     Results from last 7 days   Lab Units 02/22/24  1518   SODIUM mmol/L 138   POTASSIUM mmol/L 4.0   CHLORIDE mmol/L 103   CO2 mmol/L 22.6   BUN mg/dL 27*   CREATININE mg/dL 0.94   CALCIUM mg/dL 9.6   GLUCOSE mg/dL 91   ALBUMIN g/dL 3.8   BILIRUBIN mg/dL 0.3   ALK PHOS U/L 88   AST (SGOT) U/L 21   ALT (SGPT) U/L 18   Estimated Creatinine Clearance: 53.6 mL/min (by C-G formula based on SCr of 0.94 mg/dL).  No results found for: \"AMMONIA\"    No results found for: \"HGBA1C\", \"POCGLU\"  No results found for: \"HGBA1C\"  Lab Results   Component Value Date    TSH 0.915 02/22/2024       No results found for: \"BLOODCX\"  No results found for: \"URINECX\"  No results found for: \"WOUNDCX\"  No results found for: \"STOOLCX\"  No results found for: \"RESPCX\"  No results found for: " "\"MRSACX\"  Pain Management Panel           No data to display              I have personally reviewed the above laboratory results.   ---------------------------------------------------------------------------------------------------------------------  Imaging Results (Last 7 Days)       Procedure Component Value Units Date/Time    MRI Hip Right Without Contrast [[id number]] Collected: 02/22/24 1904     Updated: 02/22/24 2003    Narrative:      Comparison: None     Fracture right intertrochanter which involves the right greater  trochanter. No dislocation is seen. Left hip is intact. Soft tissue  edema surrounding right hip. No significant joint effusion is seen. Tear  of the right gluteus medius muscle at the myotendinous junction.       Impression:      Impression:  1. Right intertrochanteric fracture.  2. Edema right hip with tear the right gluteus medius muscle.        This report was finalized on 2/22/2024 7:08 PM by Ric Bowman DO.       CT Abdomen Pelvis With Contrast [[id number]] Collected: 02/22/24 1651     Updated: 02/22/24 1655    Narrative:      PROCEDURE: CT ABDOMEN PELVIS W CONTRAST-     HISTORY:  sepsis of unknown etiology; S72.114A-Nondisplaced fracture of  greater trochanter of right femur, initial encounter for closed  fracture; J96.01-Acute respiratory failure with hypoxia     COMPARISON:  None .     TECHNIQUE: Multiple axial CT images were obtained from the lung bases  through the pubic symphysis following the administration of Isovue 300  contrast. This study was performed with techniques to keep radiation  doses as low as reasonably achievable (ALARA). Individualized dose  reduction techniques using automated exposure control or adjustment of  mA and/or kV according to the patient size were employed.     FINDINGS:     ABDOMEN: The lung bases are clear. The heart is normal in size. The  liver is normal in attenuation with no focal lesions. The spleen is  homogenous. No adrenal mass is present. " The pancreas is unremarkable.  The kidneys are normal. The aorta is normal in caliber. The mesenteric  vascualture is patent. There is no free fluid or adenopathy. There is a  moderate sized hiatal hernia. There is no evidence of a small bowel  obstruction.     PELVIS: The appendix is not identified. The patient is status post  hysterectomy. The urinary bladder is unremarkable. There is no  significant free fluid or adenopathy. Bone windows redemonstrate a  nondisplaced fracture of the right greater trochanter. There are diffuse  degenerative changes within the lumbar spine with grade 1  anterolisthesis of L4 on L5 the extraperitoneal soft tissues are  unremarkable.       Impression:      No acute intra-abdominal or pelvic process.        This report was finalized on 2/22/2024 4:53 PM by Kayla Canales M.D..       CT Angiogram Chest Pulmonary Embolism [[id number]] Collected: 02/22/24 1649     Updated: 02/22/24 1652    Narrative:      PROCEDURE: CT ANGIOGRAM CHEST PULMONARY EMBOLISM-     HISTORY: Pulmonary embolism (PE) suspected, positive D-dimer;  S72.114A-Nondisplaced fracture of greater trochanter of right femur,  initial encounter for closed fracture; J96.01-Acute respiratory failure  with hypoxia     COMPARISON: None .     TECHNIQUE: Multiple axial CT images were obtained from the thoracic  inlet through the upper abdomen following the administration of Isovue  300 per the CT PE protocol. Coronal and oblique 3D MIP images were  reconstructed from the original axial data set. This study was performed  with techniques to keep radiation doses as low as reasonably achievable  (ALARA). Individualized dose reduction techniques using automated  exposure control or adjustment of mA and/or kV according to the patient  size were employed.     FINDINGS: There are no filling defects within the pulmonary arteries to  suggest an embolus. The thoracic aorta is normal in caliber the heart is  normal in size. There are no  pleural or pericardial effusions. There is  no adenopathy. There is a moderate sized hiatal hernia. Lung windows  reveal fibrotic lung changes. The visualized upper abdomen is  unremarkable. Bone windows reveal no acute osseous abnormalities.       Impression:      1. No evidence of pulmonary embolus.  2. Fibrotic lung changes.              This report was finalized on 2/22/2024 4:50 PM by Kayla Canales M.D..       CT Lower Extremity Right Without Contrast [[id number]] Collected: 02/22/24 1534     Updated: 02/22/24 1537    Narrative:      PROCEDURE: CT LOWER EXTREMITY RIGHT WO CONTRAST-     HISTORY: right hip pain s/p fall     COMPARISON: None .     PROCEDURE: Axial images were obtained through the right hip by computed  tomography. Sagittal and coronal reconstruction images were performed.  This study was performed with techniques to keep radiation doses as low  as reasonably achievable (ALARA). Individualized dose reduction  techniques using automated exposure control or adjustment of mA and/or  kV according to the patient size were employed.         FINDINGS: There is a fracture of the right greater trochanter. No other  fractures or dislocations are evident. The joint spaces are preserved.  The soft tissues are unremarkable.       Impression:      Fracture of the right greater trochanter.              This report was finalized on 2/22/2024 3:35 PM by Kayla Canales M.D..       CT Pelvis Without Contrast [[id number]] Collected: 02/22/24 1533     Updated: 02/22/24 1536    Narrative:      PROCEDURE: CT PELVIS WO CONTRAST-     HISTORY: pain s/p fall     COMPARISON: None.     PROCEDURE: Axial images were obtained through the pelvis using bone  window algorithms. Coronal and sagittal images were reformatted. This  study was performed with techniques to keep radiation doses as low as  reasonably achievable (ALARA). Individualized dose reduction techniques  using automated exposure control or adjustment of mA  and/or kV according  to the patient size were employed.     FINDINGS:     PELVIS: There is a fracture of the right greater trochanter. No other  fractures or dislocations are evident. The pubic symphysis and SI joints  are intact. The soft tissues are unremarkable.       Impression:      Fracture of the right greater trochanter.              This report was finalized on 2/22/2024 3:34 PM by Kayla Canales M.D..       CT Head Without Contrast [[id number]] Collected: 02/22/24 1442     Updated: 02/22/24 1444    Narrative:      PROCEDURE: CT HEAD WO CONTRAST-     HISTORY: AMS; frequent falls     COMPARISON:  None .     TECHNIQUE: Multiple axial CT images were performed from the foramen  magnum to the vertex without enhancement. This study was performed with  techniques to keep radiation doses as low as reasonably achievable  (ALARA). Individualized dose reduction techniques using automated  exposure control or adjustment of mA and/or kV according to the patient  size were employed.     FINDINGS: There is generalized cerebral volume loss. Patchy  hypodensities in the periventricular white matter consistent with  chronic small vessel ischemic change. There is no CT evidence of  hemorrhage. There is no mass, mass effect or midline shift.  There is no  hydrocephalus. The paranasal sinuses are clear. Bone windows reveal no  acute osseous abnormalities.       Impression:      No acute intracranial process.              This report was finalized on 2/22/2024 2:42 PM by Kayla Canales M.D..       XR Hip With or Without Pelvis 2 - 3 View Right [[id number]] Collected: 02/22/24 1440     Updated: 02/22/24 1444    Narrative:      PROCEDURE: XR HIP W OR WO PELVIS 2-3 VIEW RIGHT-     HISTORY: pain s/p fall     COMPARISON: None.     FINDINGS: A possible nondisplaced fracture of the right greater  trochanter. The joint spaces are preserved. The pubic symphysis and SI  joints are intact. The soft tissues are unremarkable.        Impression:      Possible nondisplaced fracture of the right greater  trochanter. CT of the right hip may prove useful in further evaluation              This report was finalized on 2/22/2024 2:41 PM by Kayla Canales M.D..       XR Chest 1 View [[id number]] Collected: 02/22/24 1439     Updated: 02/22/24 1442    Narrative:      PROCEDURE: XR CHEST 1 VW-       HISTORY: wheezing     COMPARISON: 12/23/2017.     FINDINGS: The heart is normal in size. The mediastinum is unremarkable.  There is a reticular lung pattern bilaterally which is new compared to  the prior exam. There are no effusions. There is no pneumothorax. There  are no acute osseous abnormalities.       Impression:      Fibrotic lung changes with no acute cardiopulmonary process.        This report was finalized on 2/22/2024 2:40 PM by Kayla Canales M.D..             I have personally reviewed the above radiology results.     Last Echocardiogram:    ---------------------------------------------------------------------------------------------------------------------    Assessment & Plan      Active Hospital Problems    Diagnosis  POA    **Intertrochanteric fracture of right femur [S72.141A]  Yes     Intertrochanteric fracture right femur, POA  Right gluteus media muscle tear, POA  Frequent falls, POA  Right intertrochanteric hip fracture seen on CT and MRI.  Patient orthopedic surgery consult, assistance appreciated  Pain management  N.p.o. at midnight  Frequent falls due to orthostasis given description/history per daughter.  Will hold any home antihypertensive agents unless BP gets significantly elevated.  Can do orthostatic vitals after patient has had procedure and doing PT.  SIRS, POA  Patient met SIRS criteria with WBC greater than 12 and heart rate greater than 90.  Obvious source of infection at this time.  Continue monitor vital signs.  Suspect inflammatory post fracture/muscle tear  Suspected chronic CHF, POA  Family reports no known  diagnosis of CHF.  Patient does not appear to be in any sort of exacerbation at this time, no signs of volume overload.  However BNP was significantly elevated.  Patient received a liter of fluids and 80 mg of Lasix in the ED.  No echo on file for review.  Will obtain TTE preoperatively in the a.m.  CHRONIC MEDICAL PROBLEMS    Type II DM  Hold any oral hypoglycemics to prevent hypoglycemia. Will review home diabetes medications once available per pharmacy.   Hypoglycemia protocol in place if necessary.   AccuCheks before meals and at bedtime.  Essential hypertension  BP appears well controlled.  Due to possible orthostasis, hold home antihypertensives unless BP becomes significantly/consistently elevated.  Hyperlipidemia   Restart home aspirin and statin pending med rec   Ongoing tobacco abuse   Strongly encourage cessation   NRT available     F/E/N: Oral hydration.  Continue monitor electrolytes.  N.p.o. at midnight.    ---------------------------------------------------  DVT Prophylaxis: SCDs  Activity: Bedrest    The patient is considered to be a high risk patient due to: Right hip fracture    INPATIENT status due to the need for care which can only be reasonably provided in an hospital setting such as aggressive/expedited ancillary services and/or consultation services, the necessity for IV medications, close physician monitoring and/or the possible need for procedures.  In such, I feel patient’s risk for adverse outcomes and need for care warrant INPATIENT evaluation and predict the patient’s care encounter to likely last beyond 2 midnights.      Code Status: Full code    Disposition/Discharge planning: Pending clinical course, will need rehab postop    I have discussed the patient's assessment and plan with Dr. Whitmore.      Peyton Mariscal PA-C  Hospitalist Service -- [facility]       02/22/24  22:24 EST    Attending Physician: Ivon Whitmore DO        Electronically signed by Ivon Whitmore  DO Eric at 02/23/24 0652       No current facility-administered medications for this encounter.     Current Outpatient Medications   Medication Sig Dispense Refill    allopurinol (ZYLOPRIM) 100 MG tablet Take 1 tablet by mouth Daily.      amitriptyline (ELAVIL) 100 MG tablet Take 1 tablet by mouth Every Night.      aspirin 81 MG chewable tablet Chew 1 tablet Daily.      budesonide-formoterol (SYMBICORT) 160-4.5 MCG/ACT inhaler Inhale 2 puffs 2 (Two) Times a Day. 6 g 0    cetirizine (zyrTEC) 10 MG tablet Take 1 tablet by mouth Daily.      docusate sodium 100 MG capsule Take 1 capsule by mouth 2 (Two) Times a Day. 60 capsule 0    empagliflozin (JARDIANCE) 25 MG tablet tablet Take 1 tablet by mouth Daily.      Enoxaparin Sodium (LOVENOX) 40 MG/0.4ML solution prefilled syringe syringe Inject 0.4 mL under the skin into the appropriate area as directed Daily for 9 days. Indications: Prevention of Unwanted Clot in Veins 3.6 mL 0    ezetimibe (ZETIA) 10 MG tablet Take 1 tablet by mouth Daily.      FLUoxetine (PROzac) 40 MG capsule Take 1 capsule by mouth Daily.      glipizide (GLUCOTROL) 10 MG tablet Take 1 tablet by mouth Daily.      HYDROcodone-acetaminophen (NORCO)  MG per tablet Take 1 tablet by mouth Every 8 (Eight) Hours As Needed for Moderate Pain.      ipratropium (ATROVENT) 0.02 % nebulizer solution Take 2.5 mL by nebulization 3 (Three) Times a Day.      metFORMIN (GLUCOPHAGE) 1000 MG tablet Take 1 tablet by mouth 2 (Two) Times a Day With Meals.      metoprolol tartrate (LOPRESSOR) 25 MG tablet Take 0.5 tablets by mouth Every 12 (Twelve) Hours. 30 tablet 0    montelukast (SINGULAIR) 10 MG tablet Take 1 tablet by mouth Every Night.      OLANZapine zydis (ZyPREXA) 10 MG disintegrating tablet Place 1 tablet on the tongue Every Night. 30 tablet 0    omeprazole (priLOSEC) 40 MG capsule Take 1 capsule by mouth Daily.      oxybutynin XL (DITROPAN-XL) 10 MG 24 hr tablet Take 1 tablet by mouth Daily. 30 tablet 0     polyethylene glycol (MIRALAX) 17 g packet Take 17 g by mouth Daily. 30 packet 0    rosuvastatin (CRESTOR) 40 MG tablet Take 1 tablet by mouth Every Night.      vitamin D (ERGOCALCIFEROL) 1.25 MG (83869 UT) capsule capsule Take 1 capsule by mouth 1 (One) Time Per Week.          Operative/Procedure Notes (most recent note)        Brijesh Burton MD at 02/24/24 0949          [facility] [place]  2/24/2024      Operative Note:    Surgeon: Brijesh Burton MD    Assistant: GUERRERO Peters    Preoperative Diagnosis:   Right intertrochanteric hip fracture, nondisplaced    Postoperative Diagnosis:   Same    Procedure(s):    1.  Right intertrochanteric hip fracture repair with short cephalomedullary nail, CPT code 63206  2.  Intraoperative use of fluoroscopy, CPT code 67330    Anesthesia: General    Estimated Blood Loss: 50 cc    Specimen Obtained:  None    Complication(s):  None apparent.     Implants: Synthes short cephalomedullary nail, 130 degrees, 10 mm, 95 mm lag screw, 36 mm static locking screw    Operative Indication:     the patient is a 68-year-old who fell and sustained the above injury.  She is a community ambulator without any assistive devices.  Due to the acute nature of her injury she elected to proceed with the above operation.    Operative Details:    The patient was met in the preoperative suite where the correct operative site was marked. The patient was brought to the operating room where anesthesia was initiated. The patient was positioned appropriately with all bony prominences well-padded. The patient was prepped and draped in the usual sterile fashion and prior to incision a timeout was observed to verify the correct operative site, procedure and antibiotics.    A longitudinal incision on the proximal lateral aspect of thigh was taken down to the greater trochanter.  A guidepin was utilized to gain access to the medullary canal and advanced down to the level of the lesser.  Once checking the AP and lateral  projections and entry reamer was utilized.  11 mm reamer was utilized to sound the canal.  11 reamer was removed and a 10 mm nail was malleted into position.  A second incision was then made to place a guidepin in the center center position of the femoral head.  Once this was measured a reamer and a lag screw was then placed.  A setscrew was placed proximally.  Then x-ray was directed to the mid thigh where a static locking screw was placed with the aid of intraoperative fluoroscopy.  Final intraoperative x-rays show satisfactory reduction and placement of the hardware.  All wounds were irrigated thoroughly with sterile saline.  The wound was closed in standard layered fashion.  A soft dressing was applied.  Patient was extubated, transfer hospital bed in the PACU stable condition.  Patient tolerated procedure well without a complication.        Postoperative Plan:    Transfer to floor  Dressing-maintain dressing for now, okay to reinforce as needed saturation  Weight Bear/Lifting Status-as tolerated  DVT PPx-Lovenox 40 mg once daily for 14 days  Follow up-2 weeks in the office    Electronically Signed by: Brijesh Burton MD        Electronically signed by Brijesh Burton MD at 02/24/24 1058          Physician Progress Notes (most recent note)        Harrison Abraham APRN at 02/28/24 0831       Attestation signed by Brijesh Burton MD at 02/28/24 1332    I have reviewed this documentation and agree.               Summary:Status post right hip IT fracture repair, POD #4.                 Inpatient Progress Note  Breanne Dickson  Date: 02/28/24  MRN: [id number]      Subjective:  Resting in bed. No pain to the right hip this morning. No chest pain or shortness of air. Worked with physical therapy yesterday and tolerated it well. Walked in the room this morning with assistance and tolerated it well. Family at bedside denies concerns. Daughter reports that she is planning to take her home, possibly today.  VSS. Afebrile. No  acute events overnight.       Objective:      Vitals:    02/27/24 2000 02/28/24 0300 02/28/24 0620 02/28/24 0804   BP:  109/68 111/72    BP Location:  Left arm Left arm    Patient Position:  Lying Lying    Pulse: 87 92 92 101   Resp: 18 18 18 20   Temp:  97 °F (36.1 °C) 97.8 °F (36.6 °C)    TempSrc:  Axillary Oral    SpO2: 98% 97%  95%   Weight:       Height:            Physical Exam:  Constitutional:  Well-developed, well-nourished.  No acute distress.        Musculoskeletal:  Upon examination of the right hip, the surgical dressing is clean and dry. Minimal edema to the lateral hip, tissues soft. No erythema, warmth, or tenderness. (+) DF/PF at the ankle. Capillary refill is brisk and light touch sensation is intact, distally. DP pulse 2/4.     Labs:    Results from last 7 days   Lab Units 02/28/24  0307 02/27/24  0123 02/26/24  0132 02/24/24  0201 02/23/24  0201 02/22/24  1619 02/22/24  1518   CRP mg/dL  --   --   --   --   --   --  8.72*   LACTATE mmol/L  --   --   --   --   --  1.2  --    WBC 10*3/mm3 12.20* 13.91* 12.59*   < > 10.55  --  13.08*   HEMOGLOBIN g/dL 11.6* 11.7* 11.5*   < > 13.5  --  13.6   HEMATOCRIT % 37.4 36.4 36.0   < > 41.5  --  42.1   MCV fL 93.0 89.0 91.6   < > 88.5  --  89.2   MCHC g/dL 31.0* 32.1 31.9   < > 32.5  --  32.3   PLATELETS 10*3/mm3 268 281 280   < > 341  --  336   INR   --   --   --   --  1.03  --   --     < > = values in this interval not displayed.     Results from last 7 days   Lab Units 02/22/24  1420   PH, ARTERIAL pH units 7.435   PO2 ART mm Hg 53.6*   PCO2, ARTERIAL mm Hg 31.5*   HCO3 ART mmol/L 21.1     Results from last 7 days   Lab Units 02/28/24  0307 02/27/24  0123 02/26/24  0132 02/24/24  0201 02/23/24  0201 02/22/24  1518   SODIUM mmol/L 138 139 139   < > 140 138   POTASSIUM mmol/L 4.0 3.8 4.0   < > 3.8 4.0   CHLORIDE mmol/L 103 106 104   < > 101 103   CO2 mmol/L 22.8 22.7 23.7   < > 25.4 22.6   BUN mg/dL 14 15 14   < > 26* 27*   CREATININE mg/dL 0.74 0.90 0.93   <  "> 0.96 0.94   CALCIUM mg/dL 9.1 9.0 8.8   < > 9.6 9.6   GLUCOSE mg/dL 124* 135* 133*   < > 155* 91   ALBUMIN g/dL  --  3.3*  --   --  3.8 3.8   BILIRUBIN mg/dL  --  0.3  --   --  0.4 0.3   ALK PHOS U/L  --  95  --   --  84 88   AST (SGOT) U/L  --  33*  --   --  18 21   ALT (SGPT) U/L  --  18  --   --  16 18    < > = values in this interval not displayed.   Estimated Creatinine Clearance: 68.1 mL/min (by C-G formula based on SCr of 0.74 mg/dL).  No results found for: \"AMMONIA\"  Results from last 7 days   Lab Units 02/22/24  1922 02/22/24  1518   HSTROP T ng/L 50* 53*     Results from last 7 days   Lab Units 02/22/24  1518   PROBNP pg/mL 22,342.0*     No results found for: \"HGBA1C\"  Lab Results   Component Value Date    TSH 0.915 02/22/2024         Pain Management Panel           No data to display                Blood Culture   Date Value Ref Range Status   02/22/2024 No growth at 5 days  Final   02/22/2024 No growth at 5 days  Final     No results found for: \"URINECX\"  No results found for: \"WOUNDCX\"  No results found for: \"STOOLCX\"              Radiology:  Imaging Results (Last 72 Hours)       ** No results found for the last 72 hours. **              Assessment:      Status post right hip IT fracture repair, POD #4.       Plan:      Encourage mobility / ambulation.     Weight bear as tolerated to the right lower extremity.     Physical therapy / Occupational therapy for mobility and ambulation.     On Lovenox for DVT prophylaxis x 14 days.     Maintain the dressing to the right hip.  My change as needed for saturation, no Betadine or wound cleansing needed.     Pain control.       Follow up in the office with Dr. Burton in 2 weeks for reevaluation.     Please call for any questions or concerns.       Harrison Abraham, JAZMÍN   February 28, 2024   08:31 EST    Electronically signed by Brijesh Burton MD at 02/28/24 1332          Consult Notes (most recent note)        Indio Edmond, APRN at 02/23/24 0943        " Consult Orders    1. Inpatient Orthopedic Surgery Consult [[id number]] ordered by Ivon Whitmore DO              Attestation signed by Brijesh Burton MD at [phone]    I have reviewed this documentation and agree.                                                  Inpatient Orthopedic Surgery Consult  Consult performed by: Indio Edmond APRN  Consult ordered by: Ivon Whitmore DO          Patient Identification:  Name:  Breanne Dickson  Age:  68 y.o.  Sex:  female  :  1955  MRN:  [id number]  Visit Number:  [id number]  Primary care provider:  Mery Coulter DO    History of presenting illness:   This is a 68-year-old female who presented to the emergency department secondary to right hip pain.  The patient's family was at bedside.  Patient has significant history of dementia, is unable to assist any significant history.  Patient's family notes that on Tuesday night she sustained a mechanical fall.  The patient has been ambulating on the right lower extremity, but was noting persistent right hip pain with ambulation.  Patient's family brought the patient to the emergency department for evaluation.  The patient on an MRI imaging was noted to have a right intertrochanteric fracture.  The patient at baseline is fairly mobile.  She has not had history of any known frequent falls.  The patient does not routinely utilize any walker or cane for assistive device.  The patient's family notes that they have continued to note decline in her cognition since  when she was initially diagnosed with dementia.  The patient is pleasantly confused on exam.  She is alert to place, and person.  She was not alert to the year.  But did know who the president was.  Patient takes aspirin for anticoagulation.  No other arthralgias noted this morning on exam.    Review of Systems:     Unable to accurately obtain due to patient's  confusion.  ---------------------------------------------------------------------------------------------------------------------  ---------------------------------------------------------------------------------------------------------------------   Past History:  Family History   Problem Relation Age of Onset    Breast cancer Neg Hx      Past Medical History:   Diagnosis Date    Arthritis     Diabetes mellitus     Hyperlipidemia     Hypertension     Multiple rib fractures 2000     History reviewed. No pertinent surgical history.  Social History     Socioeconomic History    Marital status: Single   Tobacco Use    Smoking status: Every Day     Packs/day: 0.25     Years: 40.00     Additional pack years: 0.00     Total pack years: 10.00     Types: Electronic Cigarette, Cigarettes   Vaping Use    Vaping Use: Never used   Substance and Sexual Activity    Alcohol use: No    Drug use: No    Sexual activity: Defer     ---------------------------------------------------------------------------------------------------------------------   Allergies:  Penicillins  ---------------------------------------------------------------------------------------------------------------------   Prior to Admission Medications       Prescriptions Last Dose Informant Patient Reported? Taking?    acetaminophen-codeine (TYLENOL #3) 300-30 MG per tablet   No No    Take 1 tablet by mouth Every 6 (Six) Hours As Needed for Moderate Pain .    aspirin 81 MG chewable tablet   Yes No    Chew 81 mg Daily.    cyclobenzaprine (FLEXERIL) 10 MG tablet   No No    Take 1 tablet by mouth 2 (Two) Times a Day As Needed for Muscle Spasms.    FLUoxetine (PROzac) 20 MG capsule   Yes No    Take 40 mg by mouth Daily.    indomethacin (INDOCIN) 50 MG capsule   No No    Take 1 capsule by mouth 3 (Three) Times a Day With Meals.    LISINOPRIL PO   Yes No    Take  by mouth.    metFORMIN (GLUCOPHAGE) 500 MG tablet   Yes No    Take 500 mg by mouth 2 (Two) Times a Day With  Meals.    methylPREDNISolone (MEDROL, JORGE,) 4 MG tablet   No No    Take as directed on package instructions.    SIMVASTATIN PO   Yes No    Take  by mouth.          [facility] Meds:  nicotine, 1 patch, Transdermal, Q24H  sodium chloride, 10 mL, Intravenous, Q12H         ---------------------------------------------------------------------------------------------------------------------   Vital Signs:  Temp:  [97.6 °F (36.4 °C)-98.7 °F (37.1 °C)] 97.6 °F (36.4 °C)  Heart Rate:  [] 95  Resp:  [17-18] 18  BP: (105-140)/(72-94) 113/75      02/22/24  1315 02/22/24  2154   Weight: 59.4 kg (131 lb) 58.5 kg (129 lb)     Body mass index is 26.05 kg/m².  ---------------------------------------------------------------------------------------------------------------------   Physical exam:  Constitutional:  Well developed, well nourished.  No respiratory distress.      HENT:  Head: Normocephalic and atraumatic.  Neck:  Neck supple.  Cardiovascular:  Normal rate  Pulmonary/Chest:  No respiratory distress  Abdominal:  Soft, no tenderness.   Musculoskeletal: Upon examination of the right hip the patient has no open wounds noted.  No significant soft tissue swelling is noted.  The right thigh is soft.  Good neurovascular status right lower extremity noted.  Dorsiflexion plantarflexion intact right ankle.  No foot drop abnormality is noted.  The patient has positive pedal pulses noted to the right lower extremity.  No cyanosis is noted.  There is no cellulitis or erythema of the right hip noted.  Neurological:  Alert and oriented to person and place but not to time.  Skin:  Skin is warm and dry  Psychiatric:  Normal mood and affect  Peripheral vascular:  No edema   ---------------------------------------------------------------------------------------------------------------------   .  ---------------------------------------------------------------------------------------------------------------------   Results from last 7 days  "  Lab Units 02/23/24  0201 02/22/24  1619 02/22/24  1518   CRP mg/dL  --   --  8.72*   LACTATE mmol/L  --  1.2  --    WBC 10*3/mm3 10.55  --  13.08*   HEMOGLOBIN g/dL 13.5  --  13.6   HEMATOCRIT % 41.5  --  42.1   MCV fL 88.5  --  89.2   MCHC g/dL 32.5  --  32.3   PLATELETS 10*3/mm3 341  --  336   INR  1.03  --   --      Results from last 7 days   Lab Units 02/22/24  1420   PH, ARTERIAL pH units 7.435   PO2 ART mm Hg 53.6*   PCO2, ARTERIAL mm Hg 31.5*   HCO3 ART mmol/L 21.1     Results from last 7 days   Lab Units 02/23/24  0201 02/22/24  1518   SODIUM mmol/L 140 138   POTASSIUM mmol/L 3.8 4.0   CHLORIDE mmol/L 101 103   CO2 mmol/L 25.4 22.6   BUN mg/dL 26* 27*   CREATININE mg/dL 0.96 0.94   CALCIUM mg/dL 9.6 9.6   GLUCOSE mg/dL 155* 91   ALBUMIN g/dL 3.8 3.8   BILIRUBIN mg/dL 0.4 0.3   ALK PHOS U/L 84 88   AST (SGOT) U/L 18 21   ALT (SGPT) U/L 16 18   Estimated Creatinine Clearance: 52.5 mL/min (by C-G formula based on SCr of 0.96 mg/dL).  No results found for: \"AMMONIA\"  Results from last 7 days   Lab Units 02/22/24  1922 02/22/24  1518   HSTROP T ng/L 50* 53*     Results from last 7 days   Lab Units 02/22/24  1518   PROBNP pg/mL 22,342.0*     No results found for: \"HGBA1C\"  Lab Results   Component Value Date    TSH 0.915 02/22/2024     No results found for: \"PREGTESTUR\", \"PREGSERUM\", \"HCG\", \"HCGQUANT\"  Pain Management Panel           No data to display              No results found for: \"BLOODCX\"  No results found for: \"URINECX\"  No results found for: \"WOUNDCX\"  No results found for: \"STOOLCX\"      ---------------------------------------------------------------------------------------------------------------------   Imaging Results (Last 7 Days)       Procedure Component Value Units Date/Time    MRI Hip Right Without Contrast [[id number]] Collected: 02/22/24 1904     Updated: 02/22/24 2003    Narrative:      Comparison: None     Fracture right intertrochanter which involves the right greater  trochanter. No " dislocation is seen. Left hip is intact. Soft tissue  edema surrounding right hip. No significant joint effusion is seen. Tear  of the right gluteus medius muscle at the myotendinous junction.       Impression:      Impression:  1. Right intertrochanteric fracture.  2. Edema right hip with tear the right gluteus medius muscle.        This report was finalized on 2/22/2024 7:08 PM by Ric Bowman DO.       CT Abdomen Pelvis With Contrast [[id number]] Collected: 02/22/24 1651     Updated: 02/22/24 1655    Narrative:      PROCEDURE: CT ABDOMEN PELVIS W CONTRAST-     HISTORY:  sepsis of unknown etiology; S72.114A-Nondisplaced fracture of  greater trochanter of right femur, initial encounter for closed  fracture; J96.01-Acute respiratory failure with hypoxia     COMPARISON:  None .     TECHNIQUE: Multiple axial CT images were obtained from the lung bases  through the pubic symphysis following the administration of Isovue 300  contrast. This study was performed with techniques to keep radiation  doses as low as reasonably achievable (ALARA). Individualized dose  reduction techniques using automated exposure control or adjustment of  mA and/or kV according to the patient size were employed.     FINDINGS:     ABDOMEN: The lung bases are clear. The heart is normal in size. The  liver is normal in attenuation with no focal lesions. The spleen is  homogenous. No adrenal mass is present. The pancreas is unremarkable.  The kidneys are normal. The aorta is normal in caliber. The mesenteric  vascualture is patent. There is no free fluid or adenopathy. There is a  moderate sized hiatal hernia. There is no evidence of a small bowel  obstruction.     PELVIS: The appendix is not identified. The patient is status post  hysterectomy. The urinary bladder is unremarkable. There is no  significant free fluid or adenopathy. Bone windows redemonstrate a  nondisplaced fracture of the right greater trochanter. There are diffuse  degenerative  changes within the lumbar spine with grade 1  anterolisthesis of L4 on L5 the extraperitoneal soft tissues are  unremarkable.       Impression:      No acute intra-abdominal or pelvic process.        This report was finalized on 2/22/2024 4:53 PM by Kayla Canales M.D..       CT Angiogram Chest Pulmonary Embolism [[id number]] Collected: 02/22/24 1649     Updated: 02/22/24 1652    Narrative:      PROCEDURE: CT ANGIOGRAM CHEST PULMONARY EMBOLISM-     HISTORY: Pulmonary embolism (PE) suspected, positive D-dimer;  S72.114A-Nondisplaced fracture of greater trochanter of right femur,  initial encounter for closed fracture; J96.01-Acute respiratory failure  with hypoxia     COMPARISON: None .     TECHNIQUE: Multiple axial CT images were obtained from the thoracic  inlet through the upper abdomen following the administration of Isovue  300 per the CT PE protocol. Coronal and oblique 3D MIP images were  reconstructed from the original axial data set. This study was performed  with techniques to keep radiation doses as low as reasonably achievable  (ALARA). Individualized dose reduction techniques using automated  exposure control or adjustment of mA and/or kV according to the patient  size were employed.     FINDINGS: There are no filling defects within the pulmonary arteries to  suggest an embolus. The thoracic aorta is normal in caliber the heart is  normal in size. There are no pleural or pericardial effusions. There is  no adenopathy. There is a moderate sized hiatal hernia. Lung windows  reveal fibrotic lung changes. The visualized upper abdomen is  unremarkable. Bone windows reveal no acute osseous abnormalities.       Impression:      1. No evidence of pulmonary embolus.  2. Fibrotic lung changes.              This report was finalized on 2/22/2024 4:50 PM by Kayla Canales M.D..       CT Lower Extremity Right Without Contrast [[id number]] Collected: 02/22/24 1534     Updated: 02/22/24 1537    Narrative:       PROCEDURE: CT LOWER EXTREMITY RIGHT WO CONTRAST-     HISTORY: right hip pain s/p fall     COMPARISON: None .     PROCEDURE: Axial images were obtained through the right hip by computed  tomography. Sagittal and coronal reconstruction images were performed.  This study was performed with techniques to keep radiation doses as low  as reasonably achievable (ALARA). Individualized dose reduction  techniques using automated exposure control or adjustment of mA and/or  kV according to the patient size were employed.         FINDINGS: There is a fracture of the right greater trochanter. No other  fractures or dislocations are evident. The joint spaces are preserved.  The soft tissues are unremarkable.       Impression:      Fracture of the right greater trochanter.              This report was finalized on 2/22/2024 3:35 PM by Kayla Canales M.D..       CT Pelvis Without Contrast [[id number]] Collected: 02/22/24 1533     Updated: 02/22/24 1536    Narrative:      PROCEDURE: CT PELVIS WO CONTRAST-     HISTORY: pain s/p fall     COMPARISON: None.     PROCEDURE: Axial images were obtained through the pelvis using bone  window algorithms. Coronal and sagittal images were reformatted. This  study was performed with techniques to keep radiation doses as low as  reasonably achievable (ALARA). Individualized dose reduction techniques  using automated exposure control or adjustment of mA and/or kV according  to the patient size were employed.     FINDINGS:     PELVIS: There is a fracture of the right greater trochanter. No other  fractures or dislocations are evident. The pubic symphysis and SI joints  are intact. The soft tissues are unremarkable.       Impression:      Fracture of the right greater trochanter.              This report was finalized on 2/22/2024 3:34 PM by Kayla Canales M.D..       CT Head Without Contrast [[id number]] Collected: 02/22/24 1442     Updated: 02/22/24 1444    Narrative:      PROCEDURE: CT  HEAD WO CONTRAST-     HISTORY: AMS; frequent falls     COMPARISON:  None .     TECHNIQUE: Multiple axial CT images were performed from the foramen  magnum to the vertex without enhancement. This study was performed with  techniques to keep radiation doses as low as reasonably achievable  (ALARA). Individualized dose reduction techniques using automated  exposure control or adjustment of mA and/or kV according to the patient  size were employed.     FINDINGS: There is generalized cerebral volume loss. Patchy  hypodensities in the periventricular white matter consistent with  chronic small vessel ischemic change. There is no CT evidence of  hemorrhage. There is no mass, mass effect or midline shift.  There is no  hydrocephalus. The paranasal sinuses are clear. Bone windows reveal no  acute osseous abnormalities.       Impression:      No acute intracranial process.              This report was finalized on 2/22/2024 2:42 PM by Kayla Canales M.D..       XR Hip With or Without Pelvis 2 - 3 View Right [[id number]] Collected: 02/22/24 1440     Updated: 02/22/24 1444    Narrative:      PROCEDURE: XR HIP W OR WO PELVIS 2-3 VIEW RIGHT-     HISTORY: pain s/p fall     COMPARISON: None.     FINDINGS: A possible nondisplaced fracture of the right greater  trochanter. The joint spaces are preserved. The pubic symphysis and SI  joints are intact. The soft tissues are unremarkable.       Impression:      Possible nondisplaced fracture of the right greater  trochanter. CT of the right hip may prove useful in further evaluation              This report was finalized on 2/22/2024 2:41 PM by Kayla Canales M.D..       XR Chest 1 View [[id number]] Collected: 02/22/24 1439     Updated: 02/22/24 1442    Narrative:      PROCEDURE: XR CHEST 1 VW-       HISTORY: wheezing     COMPARISON: 12/23/2017.     FINDINGS: The heart is normal in size. The mediastinum is unremarkable.  There is a reticular lung pattern bilaterally which is new  compared to  the prior exam. There are no effusions. There is no pneumothorax. There  are no acute osseous abnormalities.       Impression:      Fibrotic lung changes with no acute cardiopulmonary process.        This report was finalized on 2024 2:40 PM by Kayla Canales M.D..             ----------------------------------------------------------------------------------------------------------------------  Assessment:   #1 right hip intertrochanteric fracture          Plan:     Dr. Burton reviewed the patient's imaging.  On MRI imaging, patient has findings consistent with right hip intertrochanteric fracture.  Dr. Burton recommends surgical intervention.  The patient be scheduled for right hip open reduction internal fixation with intramedullary nail.    Surgical intervention will be scheduled for 2024    Patient will be n.p.o. after midnight    PT/INR obtained preoperatively    Will obtain 2 views of the right femur to rule out more distal fracture morphology.    Type and screen has been obtained    Dr. Burton will discuss the risks and benefits associated with surgical intervention with the patient's family    Hospitalist for medical management    Orthopedic surgery will continue to follow      Thank you for the consult.    Indio Edmond, JAZMÍN  24  09:43 EST    Electronically signed by Brijesh Burton MD at 24 2096       Nutrition Notes (most recent note)    No notes exist for this encounter.          Physical Therapy Notes (most recent note)        Ivana Murillo PTA at 24 6818  Version 1 of 1         Acute Care - Physical Therapy Treatment Note  [facility]     Patient Name: Breanne Dickson  : 1955  MRN: [id number]  Today's Date: 2024   Onset of Illness/Injury or Date of Surgery: 24  Visit Dx:     ICD-10-CM ICD-9-CM   1. Closed nondisplaced intertrochanteric fracture of right femur, initial encounter  S72.144A 820.21   2. Closed nondisplaced fracture of  greater trochanter of right femur, initial encounter  S72.114A 820.20   3. Acute on chronic respiratory failure with hypoxia  J96.21 518.84     799.02   4. Hypervolemia, unspecified hypervolemia type  E87.70 276.69   5. COPD with acute exacerbation  J44.1 491.21   6. Sepsis, due to unspecified organism, unspecified whether acute organ dysfunction present  A41.9 038.9     995.91   7. Chronic obstructive pulmonary disease, unspecified COPD type  J44.9 496     Patient Active Problem List   Diagnosis    Intertrochanteric fracture of right femur     Past Medical History:   Diagnosis Date    Arthritis     Diabetes mellitus     Hyperlipidemia     Hypertension     Multiple rib fractures 2000     Past Surgical History:   Procedure Laterality Date    FEMUR IM NAILING/RODDING Right 2/24/2024    Procedure: FEMUR INTRAMEDULLARY NAILING/RODDING;  Surgeon: Brijesh Burton MD;  Location: [facility];  Service: Orthopedics;  Laterality: Right;     PT Assessment (last 12 hours)       PT Evaluation and Treatment       Row Name 02/28/24 1448          Physical Therapy Time and Intention    Subjective Information no complaints  -     Document Type therapy note (daily note)  -HC     Mode of Treatment physical therapy  -HC     Patient Effort good  -HC     Comment Pt in agreement for PT. Pt was walking with daugther at start of Rx. Pt walked 100' x 2 with RW CGA. Sitting exercises completed until tolerance.  -       Row Name 02/28/24 1448          General Information    Patient Profile Reviewed yes  -HC     Existing Precautions/Restrictions fall  -     Limitations/Impairments safety/cognitive  -       Row Name 02/28/24 1448          Pain Scale: FACES Pre/Post-Treatment    Pain: FACES Scale, Pretreatment 2-->hurts little bit  -     Posttreatment Pain Rating 4-->hurts little more  -       Row Name 02/28/24 1448          Cognition    Affect/Mental Status (Cognition) confused  -HC     Orientation Status (Cognition) oriented to;person   -     Follows Commands (Cognition) follows one-step commands;repetition of directions required;verbal cues/prompting required  -     Personal Safety Interventions fall prevention program maintained;gait belt;nonskid shoes/slippers when out of bed;supervised activity  -       Row Name 02/28/24 1448          Vision Assessment/Intervention    Visual Impairment/Limitations corrective lenses full-time  -       Row Name 02/28/24 1448          Mobility    Extremity Weight-bearing Status right lower extremity  -     Right Lower Extremity (Weight-bearing Status) weight-bearing as tolerated (WBAT)  -       Row Name 02/28/24 1448          Bed Mobility    Comment, (Bed Mobility) OOB  -       Row Name 02/28/24 1448          Transfers    Transfers sit-stand transfer;stand-sit transfer;stand pivot/stand step transfer  -       Row Name 02/28/24 1448          Sit-Stand Transfer    Sit-Stand [place] (Transfers) verbal cues;nonverbal cues (demo/gesture);minimum assist (75% patient effort)  -     Assistive Device (Sit-Stand Transfers) walker, front-wheeled  -       Row Name 02/28/24 1448          Stand-Sit Transfer    Stand-Sit [place] (Transfers) minimum assist (75% patient effort);nonverbal cues (demo/gesture);verbal cues  -     Assistive Device (Stand-Sit Transfers) walker, front-wheeled  -       Row Name 02/28/24 1448          Stand Pivot/Stand Step Transfer    Stand Pivot/Stand Step [place] (Transfers) verbal cues;nonverbal cues (demo/gesture);minimum assist (75% patient effort)  -     Assistive Device (Stand Pivot Stand Step Transfer) walker, front-wheeled  -       Row Name 02/28/24 1448          Gait/Stairs (Locomotion)    Gait/Stairs Locomotion gait/ambulation independence;gait/ambulation assistive device;distance ambulated;gait pattern;gait deviations;maintains weight-bearing status  -     [place] Level (Gait) verbal cues;nonverbal cues (demo/gesture);contact guard  -      Assistive Device (Gait) walker, front-wheeled  -HC     Distance in Feet (Gait) 100' x 2  -HC     Pattern (Gait) step-to  -HC     Deviations/Abnormal Patterns (Gait) antalgic;gait speed decreased;weight shifting decreased  -HC       Row Name 02/28/24 1448          Safety Issues, Functional Mobility    Impairments Affecting Function (Mobility) balance;cognition;endurance/activity tolerance;pain;strength  -       Row Name 02/28/24 1448          Motor Skills    Therapeutic Exercise other (see comments)  Sitting: AP, LAQ, March, knees in/out  -HC       Row Name             Wound 02/24/24 1033 Right anterior thigh    Wound - Properties Group Placement Date: 02/24/24  -JG Placement Time: 1033  -JG Side: Right  -JG Orientation: anterior  -JG Location: thigh  -JG    Retired Wound - Properties Group Placement Date: 02/24/24  -JG Placement Time: 1033  -JG Side: Right  -JG Orientation: anterior  -JG Location: thigh  -JG    Retired Wound - Properties Group Date first assessed: 02/24/24  -JG Time first assessed: 1033  -JG Side: Right  -JG Location: thigh  -JG      Row Name 02/28/24 1448          Coping    Observed Emotional State calm;cooperative  -     Verbalized Emotional State acceptance  -     Family/Support Persons daughter  -HC     Involvement in Care at bedside;attentive to patient;participating in care  -       Row Name 02/28/24 1448          Positioning and Restraints    Pre-Treatment Position other (comment)  -     Post Treatment Position chair  -HC     In Chair sitting;call light within reach;encouraged to call for assist;exit alarm on;notified [facility]  -               User Key  (r) = Recorded By, (t) = Taken By, (c) = Cosigned By      Initials Name Provider Type    Lourdes Braun, RN Registered Nurse    Ivana Phan PTA Physical Therapist Assistant                    Physical Therapy Education       Title: PT OT SLP Therapies (Resolved)       Topic: Physical Therapy (Resolved)       Point:  Mobility training (Resolved)       Learning Progress Summary             Patient Acceptance, E,D, NR by AG at 2/26/2024 1120   Family Acceptance, E,D, NR by AG at 2/26/2024 1120                         Point: Home exercise program (Resolved)       Learning Progress Summary             Patient Acceptance, E,D, NR by AG at 2/26/2024 1120   Family Acceptance, E,D, NR by AG at 2/26/2024 1120                         Point: Body mechanics (Resolved)       Learning Progress Summary             Patient Acceptance, E,D, NR by AG at 2/26/2024 1120   Family Acceptance, E,D, NR by AG at 2/26/2024 1120                         Point: Precautions (Resolved)       Learning Progress Summary             Patient Acceptance, E,D, NR by  at 2/26/2024 1120   Family Acceptance, E,D, NR by AG at 2/26/2024 1120                                         User Key       Initials Effective Dates Name Provider Type [facility] 06/16/21 -  Ijeoma Lutz, PT Physical Therapist PT                  PT Recommendation and Plan  Anticipated Discharge Disposition (PT): inpatient rehabilitation facility          Time Calculation:    PT Charges       Row Name 02/28/24 1453             Time Calculation    PT Received On 02/28/24  -HC         Time Calculation- PT    Total Timed Code Minutes- PT 23 minute(s)  -                User Key  (r) = Recorded By, (t) = Taken By, (c) = Cosigned By      Initials Name Provider Type     Ivana Murillo PTA Physical Therapist Assistant                  Therapy Charges for Today       Code Description Service Date Service Provider Modifiers Qty    92241893147 HC GAIT TRAINING EA 15 MIN 2/27/2024 Ivana Murillo PTA GP, CQ 1    14061382610 HC PT THER PROC EA 15 MIN 2/27/2024 Ivana Murillo PTA GP, CQ 1    63436761417 HC GAIT TRAINING EA 15 MIN 2/28/2024 Ivana Murillo PTA GP, CQ 1    12699750220 HC PT THER PROC EA 15 MIN 2/28/2024 Ivana Murillo PTA GP, CQ 1            PT  G-Codes  AM-PAC 6 Clicks Score (PT): 18    Ivana Murillo PTA  2024      Electronically signed by Ivana Murillo PTA at 24 1455          Occupational Therapy Notes (most recent note)        Brijesh Davis, OT at 24 1140          Acute Care - Occupational Therapy Treatment Note   Roger     Patient Name: Breanne Dickson  : 1955  MRN: [id number]  Today's Date: 2024  Onset of Illness/Injury or Date of Surgery: 24     Referring Physician: Micheal    Admit Date: 2024       ICD-10-CM ICD-9-CM   1. Closed nondisplaced intertrochanteric fracture of right femur, initial encounter  S72.144A 820.21   2. Closed nondisplaced fracture of greater trochanter of right femur, initial encounter  S72.114A 820.20   3. Acute on chronic respiratory failure with hypoxia  J96.21 518.84     799.02   4. Hypervolemia, unspecified hypervolemia type  E87.70 276.69   5. COPD with acute exacerbation  J44.1 491.21   6. Sepsis, due to unspecified organism, unspecified whether acute organ dysfunction present  A41.9 038.9     995.91   7. Chronic obstructive pulmonary disease, unspecified COPD type  J44.9 496     Patient Active Problem List   Diagnosis    Intertrochanteric fracture of right femur     Past Medical History:   Diagnosis Date    Arthritis     Diabetes mellitus     Hyperlipidemia     Hypertension     Multiple rib fractures      Past Surgical History:   Procedure Laterality Date    FEMUR IM NAILING/RODDING Right 2024    Procedure: FEMUR INTRAMEDULLARY NAILING/RODDING;  Surgeon: Brijesh Burton MD;  [place] [facility];  Service: Orthopedics;  Laterality: Right;         OT ASSESSMENT FLOWSHEET (last 12 hours)       OT Evaluation and Treatment       Row Name 24 1135                   OT Time and Intention    Document Type therapy note (daily note)  -KR        Mode of Treatment occupational therapy  -KR        Patient Effort adequate  -KR        Comment Daughter explained that pt  had been denied rehabilitation admission and she was going to take her mother home to receive 24/7 care/assist as needed. We discussed AE/DME needs in home and caregiver agreed to receive/provide all that is recommended/needed. Pt will need elevated toilet seat, walker.  -KR           Motor Skills    Therapeutic Exercise elbow/forearm;hand  BUE AROM continues adequate for use of DME in mobility tasks.  -KR           Elbow/Forearm (Therapeutic Exercise)    Elbow/Forearm (Therapeutic Exercise) AROM (active range of motion)  -KR        Elbow/Forearm AROM (Therapeutic Exercise) supine  -KR           Hand (Therapeutic Exercise)    Hand (Therapeutic Exercise) AROM (active range of motion)  -KR           Wound 02/24/24 1033 Right anterior thigh    Wound - Properties Group Placement Date: 02/24/24  -JG Placement Time: 1033  -JG Side: Right  -JG Orientation: anterior  -JG Location: thigh  -JG    Retired Wound - Properties Group Placement Date: 02/24/24  -JG Placement Time: 1033  -JG Side: Right  -JG Orientation: anterior  -JG Location: thigh  -JG    Retired Wound - Properties Group Date first assessed: 02/24/24  -JG Time first assessed: 1033  -JG Side: Right  -JG Location: thigh  -JG       Plan of Care Review    Plan of Care Reviewed With patient;daughter  -KR           Therapy Plan Review/Discharge Plan (OT)    Anticipated Discharge Disposition (OT) home with assist  -KR                  User Key  (r) = Recorded By, (t) = Taken By, (c) = Cosigned By      Initials Name Effective Dates    JG Lourdes Burkett RN 08/29/23 -     Brijesh Casey, OT 06/16/21 -                            OT Recommendation and Plan  Planned Therapy Interventions (OT): activity tolerance training, adaptive equipment training, BADL retraining  Plan of Care Review  Plan of Care Reviewed With: patient, daughter  Plan of Care Reviewed With: patient, daughter        Time Calculation:     Therapy Charges for Today       Code Description Service Date  Service Provider Modifiers Qty    38355384349  OT EVAL MOD COMPLEXITY 4 2/27/2024 Brijesh Davis, OT GO 1    [id number]  OT THERAPEUTIC ACT EA 15 MIN 2/28/2024 Brijesh Davis OT GO 1                 Brijesh Davis OT  2/28/2024    Electronically signed by Brijesh Davis OT at 02/28/24 1140       Speech Language Pathology Notes (most recent note)    No notes exist for this encounter.          Respiratory Therapy Notes (most recent note)        Tom Sevilla, RRT at 02/27/24 1230        Consult Orders    1. Inpatient COPD Education (EDU) Consult [[id number]] ordered by Brijesh Burton MD at 02/23/24 1441    2. Inpatient COPD Education (RT) Consult [[id number]] ordered by Brijesh Burton MD at 02/23/24 1441                 Today, I attempted COPD Education with Ms. Dickson, but was education was not successful. Due to her condition, she is unable to participate in COPD Education. When I introduced myself, she did acknowledge I was present and began ramblin about different things. When I would as her a question, she could not directly answer or she would just say I do not know. Due to her present condition, COPD Education was not completed. COPD Education was ordered by Serenity Vicente RN, Case Management, I let her know of the situation.     Tom Sevilla, BA, RRT  COPD Navigator    Electronically signed by Tom Sevilla, MURRAY at 02/27/24 1234       [unfilled]     Discharge Summary        Quinton Woods DO at 02/28/24 1123              [facility] DISCHARGE SUMMARY      Date of Admission: 2/22/2024    Date of Discharge:  2/28/2024    PCP: Mery Coulter DO    Admission Diagnosis:   Please see admission H&P    Discharge Diagnosis:   Acute intertrochanteric fracture of the right femur  Right gluteus medius muscle tear  SIRS  Acute urinary retention  Leukocytosis, likely reactive   Type II NSTEMI  Essential HTN  DM II, non-insulin dependent   COPD, stable without an acute  exacerbation  Dementia with episodes of sundowning  Constipation     Procedures Performed:  2/24/24: Right intertrochanteric hip fracture repair with short cephalomedullary nail      Consults:   Consults       Date and Time Order Name Status Description    2/22/2024  9:44 PM Inpatient Orthopedic Surgery Consult Completed     2/22/2024  8:59 PM Hospitalist (on-call MD unless specified)                History of Present Illness:  Breanne Dickson is a 68 y.o. female who presented to [facility] ED for evaluation of worsening confusion and frequent falls. Please see admission H&P for complete details.     In the ED, CT head did not reveal any acute intracranial abnormalities. Routine labs revealed elevated troponin with delta of -3, mild leukocytosis, positive D-dimer, elevated pro-BNP and elevated CRP. CT chest PE protocol was negative for PE but did reveal fibrotic changes. X-rays revealed possible nondisplaced fracture of the right greater trochanter. MRI of the right hip revealed a right intertrochanteric fracture and edema in the right hip with right gluteus medius muscle tear.      Hospital Course  Breanne Dickson was admitted to the med/surg floor for further evaluation and treatment. PRN analgesics were made available. Orthopedic surgery was consulted for further input.     Infectious workup was negative and her leukocytosis was thought to be reactive. An echo was obtained revealing an EF of 46-50%, grade II diastolic dysfunction, moderate TR and severe pulmonary HTN. Her troponin elevation was thought to be type II with no reported CP and she was continued on ASA and a statin in addition to metoprolol. She was noted to be hypoxic and placed on supplemental O2 via NC with subsequent improvement. She was not thought to be in an acute COPD exacerbation and was placed on an inhaler regimen. Possible chronic hypoxic respiratory failure was initially suspected.     Preoperatively, she experienced some urinary retention and a Madsen  catheter was placed. She was evaluated by ortho and taken to the OR on 2/24 where she underwent repair with nailing. She tolerated the procedure well and remained clinically stable postoperatively. PT/OT were consulted and she participated with therapy. She was placed on DVT PPX with Lovenox per ortho recs. Her constipation was addressed with resolving urinary retention and the Madsen was removed with no reported retention upon removal. She experienced some sundowning with overall improvement with addition of Zyprexa. She was evaluated for possible inpatient rehab placement but placement was ultimately denied by her insurance with the denial upheld on peer to peer.     Possible short term SNF placement was discussed but she and her family ultimately preferred for her to return home as she was progressing with therapy with family very supportive in her care. She was evaluated on 2/28 and deemed medically stable for discharge. She ambulated in the hallway with staff on room air with O2 remaining in the 90's. She was prescribed Lovenox to complete a total of 14 days for DVT PPX per ortho. Any necessary DME including a RW and BSC were ordered. Home health was ordered. Instructions were given for outpatient follow up with her PCP and orthopedic surgery and a referral was placed to the COPD clinic.     Condition on Discharge:  Stable    Vital Signs  Vitals:    02/28/24 0804   BP:    Pulse: 101   Resp: 20   Temp:    SpO2: 95%       Physical Exam:  General:    Awake, alert but confused, in no acute distress, chronically ill appearing   Heart:      Normal S1 and S2. Regular rate and rhythm. No significant murmur, rubs or gallops appreciated.   Lungs:     Respirations regular, even and unlabored. Lungs clear to auscultation B/L. No wheezes, rales or rhonchi.   Abdomen:   Soft and nontender. No guarding, rebound tenderness or  organomegaly noted. Bowel sounds present x 4.   Extremities:  No clubbing, cyanosis or edema noted.       Discharge Disposition:   home      Discharge Medications:     Discharge Medications        New Medications        Instructions Start Date   budesonide-formoterol 160-4.5 MCG/ACT inhaler  Commonly known as: SYMBICORT   2 puffs, Inhalation, 2 Times Daily - RT      docusate sodium 100 MG capsule   100 mg, Oral, 2 Times Daily      Enoxaparin Sodium 40 MG/0.4ML solution prefilled syringe syringe  Commonly known as: LOVENOX   40 mg, Subcutaneous, Every 24 Hours   Start Date: February 29, 2024     metoprolol tartrate 25 MG tablet  Commonly known as: LOPRESSOR   12.5 mg, Oral, Every 12 Hours Scheduled      OLANZapine zydis 10 MG disintegrating tablet  Commonly known as: ZyPREXA   10 mg, Translingual, Nightly      oxybutynin XL 10 MG 24 hr tablet  Commonly known as: DITROPAN-XL   10 mg, Oral, Daily   Start Date: February 29, 2024     polyethylene glycol 17 g packet  Commonly known as: MIRALAX   17 g, Oral, Daily   Start Date: February 29, 2024            Continue These Medications        Instructions Start Date   allopurinol 100 MG tablet  Commonly known as: ZYLOPRIM   100 mg, Oral, Daily      amitriptyline 100 MG tablet  Commonly known as: ELAVIL   100 mg, Oral, Nightly      aspirin 81 MG chewable tablet   81 mg, Oral, Daily      cetirizine 10 MG tablet  Commonly known as: zyrTEC   10 mg, Oral, Daily      empagliflozin 25 MG tablet tablet  Commonly known as: JARDIANCE   25 mg, Oral, Daily      ezetimibe 10 MG tablet  Commonly known as: ZETIA   10 mg, Oral, Daily      FLUoxetine 40 MG capsule  Commonly known as: PROzac   40 mg, Oral, Daily      glipizide 10 MG tablet  Commonly known as: GLUCOTROL   10 mg, Oral, Daily      HYDROcodone-acetaminophen  MG per tablet  Commonly known as: NORCO   1 tablet, Oral, Every 8 Hours PRN      ipratropium 0.02 % nebulizer solution  Commonly known as: ATROVENT   500 mcg, Nebulization, 3 Times Daily - RT      metFORMIN 1000 MG tablet  Commonly known as: GLUCOPHAGE   1,000 mg, Oral,  2 Times Daily With Meals      montelukast 10 MG tablet  Commonly known as: SINGULAIR   10 mg, Oral, Nightly      omeprazole 40 MG capsule  Commonly known as: priLOSEC   40 mg, Oral, Daily      rosuvastatin 40 MG tablet  Commonly known as: CRESTOR   40 mg, Oral, Nightly      vitamin D 1.25 MG (14772 UT) capsule capsule  Commonly known as: ERGOCALCIFEROL   50,000 Units, Oral, Weekly                 Discharge Diet:   Dietary Orders (From admission, onward)       Start     Ordered    02/24/24 1148  Diet: Regular/House Diet; Texture: Regular Texture (IDDSI 7); Fluid Consistency: Thin (IDDSI 0)  Diet Effective Now        References:    Diet Order [place]   Question Answer Comment   Diets: Regular/House Diet    Texture: Regular Texture (IDDSI 7)    Fluid Consistency: Thin (IDDSI 0)        02/24/24 1147                    Activity at Discharge:  activity as tolerated    Follow-up Appointments:  Additional Instructions for the Follow-ups that You Need to Schedule       Ambulatory Referral to Home Health   As directed      Face to Face Visit Date: 2/28/2024   Follow-up provider for Plan of Care?: I treated the patient in an acute care facility and will not continue treatment after discharge.   Follow-up provider: MERY COULTER [[id number]]   Reason/Clinical Findings: Right hip fx s/p repair   Describe mobility limitations that make leaving home difficult: Right hip fx s/p repair   Nursing/Therapeutic Services Requested: Skilled Nursing Physical Therapy Occupational Therapy   Skilled nursing orders: Medication education Wound care dressing/changes Cardiopulmonary assessments Neurovascular assessments COPD management   Frequency: 1 Week 1        Discharge Follow-up with PCP   As directed       Currently Documented PCP:    Mery Coulter DO    PCP Phone Number:    [phone]     Follow Up Details: Follow up with Dr. Coulter in 1 week.        Discharge Follow-up with Specified Provider: Follow up with Dr. Burton in 2  weeks.   As directed      To: Follow up with Dr. Burton in 2 weeks.               Follow-up Information       Brijesh Burton MD Follow up in 2 week(s).    Specialty: Orthopedic Surgery  Contact information:  [address] [place] [id number] [phone]               Mery Coulter DO .    Specialty: Family Medicine  Why: Follow up with Dr. Coulter in 1 week.  Contact information:  [address] [facility] [id number] [phone]               [facility] .    Specialty: Pulmonology  Contact information:  [address] [facility] [address] [phone]                               Test Results Pending at Discharge:       The ASCVD Risk score (Jose DK, et al., 2019) failed to calculate for the following reasons:    Cannot find a previous HDL lab    Cannot find a previous total cholesterol lab      Quinton Woods DO  02/28/24  11:23 EST      Time: Greater than 30 minutes spent on this discharge.           Electronically signed by Quinton Woods DO at 02/28/24 2009

## 2024-03-21 ENCOUNTER — READMISSION MANAGEMENT (OUTPATIENT)
Dept: CALL CENTER | Facility: HOSPITAL | Age: 69
End: 2024-03-21
Payer: MEDICARE

## 2024-03-21 NOTE — OUTREACH NOTE
General Surgery Week 3 Survey      Flowsheet Row Responses   [facility]? [facility]   Does the patient have one of the following disease processes/diagnoses(primary or secondary)? General Surgery   Week 3 attempt successful? No   Unsuccessful attempts Attempt 1            Teresa MATTSON - Registered Nurse

## (undated) DEVICE — Device

## (undated) DEVICE — BNDG ELAS CO-FLEX SLF ADHR 4IN5YD LF STRL

## (undated) DEVICE — DRSNG PAD ABD 8X10IN STRL

## (undated) DEVICE — GLV SURG SENSICARE PI ORTHO SZ7.5 LF STRL

## (undated) DEVICE — STPLR SKIN PROXIMATE RH WD

## (undated) DEVICE — GW FOR TROCH NAIL 3.2X400MM

## (undated) DEVICE — GLV SURG SENSICARE W/ALOE PF LF 8.5 STRL

## (undated) DEVICE — DRSNG WND STRIP OPTIFOAM AG A/MIC LF 3.5X6IN STRL

## (undated) DEVICE — STRAP STRTCHR ADJ LF 60X2 1/4IN

## (undated) DEVICE — TOWL STRL OR PREWSH COTN 17X27IN BLU DISP STRL PK/4

## (undated) DEVICE — SPNG LAP PREWSH SFTPK 18X18IN STRL PK/5

## (undated) DEVICE — TBG PENCL TELESCP MEGADYNE SMOKE EVAC 10FT

## (undated) DEVICE — PK BASIC 70